# Patient Record
Sex: FEMALE | Race: WHITE | NOT HISPANIC OR LATINO | Employment: FULL TIME | URBAN - METROPOLITAN AREA
[De-identification: names, ages, dates, MRNs, and addresses within clinical notes are randomized per-mention and may not be internally consistent; named-entity substitution may affect disease eponyms.]

---

## 2021-03-24 ENCOUNTER — BMR PREADMISSION ASSESSMENT (OUTPATIENT)
Dept: ADMISSIONS | Facility: REHABILITATION | Age: 66
End: 2021-03-24

## 2021-04-06 LAB
CREAT SERPL-MCNC: 0.7 MG/DL
EXTERNAL HEMATOCRIT: 35 %
EXTERNAL HEMOGLOBIN: 11.5 G/DL

## 2021-04-07 VITALS
DIASTOLIC BLOOD PRESSURE: 53 MMHG | OXYGEN SATURATION: 90 % | HEART RATE: 89 BPM | SYSTOLIC BLOOD PRESSURE: 110 MMHG | TEMPERATURE: 96.8 F

## 2021-04-07 PROBLEM — K21.9 GERD (GASTROESOPHAGEAL REFLUX DISEASE): Status: ACTIVE | Noted: 2021-04-07

## 2021-04-07 PROBLEM — J30.2 SEASONAL ALLERGIES: Status: ACTIVE | Noted: 2021-04-07

## 2021-04-07 PROBLEM — E66.9 OBESITY: Status: ACTIVE | Noted: 2021-04-07

## 2021-04-07 PROBLEM — E78.00 HYPERCHOLESTEROLEMIA: Status: ACTIVE | Noted: 2021-04-07

## 2021-04-07 PROBLEM — F32.A DEPRESSION: Status: ACTIVE | Noted: 2021-04-07

## 2021-04-07 PROBLEM — Z96.653 STATUS POST TOTAL BILATERAL KNEE REPLACEMENT: Status: ACTIVE | Noted: 2021-04-07

## 2021-04-07 PROBLEM — M17.0 OSTEOARTHRITIS OF KNEES, BILATERAL: Status: ACTIVE | Noted: 2021-04-07

## 2021-04-07 PROBLEM — J45.909 ASTHMA: Status: ACTIVE | Noted: 2021-04-07

## 2021-04-07 PROBLEM — I10 HTN (HYPERTENSION): Status: ACTIVE | Noted: 2021-04-07

## 2021-04-07 PROBLEM — E03.9 HYPOTHYROIDISM: Status: ACTIVE | Noted: 2021-04-07

## 2021-04-07 NOTE — HOSPITAL COURSE
66yo female with PMH asthma, GERD, hypothyroidism, HTN, obesity s/p bilateral TKA secondary to OA with various deformities.     Medications as of 4/7/21:  Acetaminophen  Albuterol  Amlodipine  Aspirin  Bupropion  Cefazolin  Celecoxib  crestor  Dexamethasone  Dextromethorphan  Docusate  Gabapentin  Hydromorphone  Levothyroxine  Milk of magnesia  Ondansetron  Pantoprazole  Sertraline  Tramadol  Zolpidem    Ortho Progress Note 4/7/21:  Post operative day #1 following bilateral total knee arthroplasties. Pt. Reports very little discomfort. Dressings are clean dry and intact. There is no blood noted.   The pt's hemoglobin is stable following bilateral total knee replacements. It is above 11. She will get physical therapy this morning and then be discharged to [facility]. Follow-up planned for 2 weeks. She cannot tolerate Percocet. She is using tramadol for pain and it is controlling her pain. She was told she would need to take aspirin 325mg twice a day for 4 weeks and then she can go back to her 81mg aspirin.

## 2021-04-08 NOTE — BMR PREADMISSION NOTE
[facility]  Preadmission Assessment    Patient Name: Lili Serrato  YOB: 1955    Referral Date: 04/06/21  Evaluation Date: 04/08/21  Referring Facility Admission Date: 04/06/21  Referring Facility: [facility]   ReferringProvider:    Ignacio Youngblood MD  Patient assessed using modified protocols established during the COVID19 pandemic  Reason for Referral: Lili Serrato is a 65 y.o. female whose primary indication for inpatient rehabilitation is Ortho.   Pertinent History of Current Functional Problem:  64yo female with PMH asthma, GERD, hypothyroidism, HTN, obesity s/p bilateral TKA secondary to OA with various deformities.     Medications as of 4/7/21:  Acetaminophen  Albuterol  Amlodipine  Aspirin  Bupropion  Cefazolin  Celecoxib  crestor  Dexamethasone  Dextromethorphan  Docusate  Gabapentin  Hydromorphone  Levothyroxine  Milk of magnesia  Ondansetron  Pantoprazole  Sertraline  Tramadol  Zolpidem    Ortho Progress Note 4/7/21:  Post operative day #1 following bilateral total knee arthroplasties. Pt. Reports very little discomfort. Dressings are clean dry and intact. There is no blood noted.   The pt's hemoglobin is stable following bilateral total knee replacements. It is above 11. She will get physical therapy this morning and then be discharged to [facility]. Follow-up planned for 2 weeks. She cannot tolerate Percocet. She is using tramadol for pain and it is controlling her pain. She was told she would need to take aspirin 325mg twice a day for 4 weeks and then she can go back to her 81mg aspirin.     Active Medical Conditions:  Patient Active Problem List   Diagnosis   • Osteoarthritis of knees, bilateral   • Status post total bilateral knee replacement   • HTN (hypertension)   • Hypercholesterolemia   • Hypothyroidism   • Depression   • Asthma   • Seasonal allergies   • GERD (gastroesophageal reflux disease)   • Obesity       Active Medications:  No  medication comments found.    HISTORY:    Past Medical History:   Diagnosis Date   • Arthritis    • Asthma    • Depression    • GERD (gastroesophageal reflux disease)    • Hypothyroidism    • Lipid disorder      Past Surgical History:   Procedure Laterality Date   •  SECTION      x3   • HYSTERECTOMY     • TONSILLECTOMY       Tobacco Use as of 3/24/2021     Smoking Status Smoking Start Date Smoking Quit Date Packs/Day Years Used    Never Assessed -- -- -- --    Types Comments Smokeless Tobacco Status Smokeless Tobacco Quit Date Source    -- -- Unknown -- --            Socioeconomic as of 3/24/2021     Marital Status Spouse Name Number of Children Years Education Education Level Preferred Language Ethnicity Race Source    Legally  -- -- -- -- English Unknown White Provider    Financial Resource Strain Food Insecurity: Worry Food Insecurity: Inability Transportation Needs: Medical Transportation Needs: Non-medical    -- -- -- -- --             Allergies  Not on File         Premorbid Functional Status:   Ambulation: independent  Transferring: independent  Toileting: independent  Bathing: independent  Dressing: independent  Eating: independent  Communication: understands/communicates without difficulty  Swallowing: swallows foods/liquids without difficulty  Assistive Device/Animal Currently Used at Home: none  Prior Level of Function Comment: I with all ADL's,  mobility, I ADL's. (+) .  Works from home full time.           Living Environment:  Lives With: child(gm), adult  Living Arrangements: house  Living Environment Comment: bedroom and bathroom on second floor; half bath on first floor  Number of Stairs, Main Entrance: 3  Number of Stairs, Within Home, Primary: 12      TEST RESULTS:     Chemistry (Up to last 3 results from the past 720 hours)      04/06    Creatinine       0.7         Hepatic (Up to last 3 results from the past 720 hours)    None      Metabolic (Up to last 3 results from the  past 720 hours)    None      Hematologic (Up to last 3 results from the past 720 hours)      04/06    Hemoglobin       11.5       Hematocrit       35         Other (Up to last 3 results from the past 720 hours)    None         Diagnostics  Infection History: COVID-19(4/6: COVID (-))    ASSESSMENT:       Vitals:           Lines/Drains/Airways:   Lines, Drains & Airways  Lines, Drains & Airways: Peripheral IV, Urinary Catheter, External  Peripheral Iv Insertion Date: 04/06/21  Urinary Catheter, External Insertion Date: 04/06/21         Risk for Clinical Complications:  Constipation: Low  DVT: Low  Falls: Moderate  Infection: Low         Precautions:  Existing Precautions/Restrictions: fall           Current Diet:   Diet: thin liquids, regular solids         Current Functional Status:  Preadmission Current Function     Row Name 04/08/21 1300       Cognition/Psychosocial    Orientation Status (Cognition)  oriented x 3    Comment, Cognition  alert, appropriate, cooperative       Upper Body Dressing    Loving  supervision       Lower Body Dressing    Loving  moderate assist (50-74% patient effort)       Bathing    Loving  moderate assist (50-74% patient effort)       Grooming    Loving  supervision       Bed Mobility    Loving, Supine to Sit  minimum assist (75% or more patient effort)    Comment (Bed Mobility)  LLE management       Bed to Chair Transfer    Loving, Bed to Chair  moderate assist (50-74% patient effort)    Assistive Device  walker, front-wheeled       Sit to Stand Transfer    Loving, Sit to Stand Transfer  minimum assist (75% or more patient effort)    Assistive Device  walker, front-wheeled       Gait Training    Loving, Gait  touching/steadying assist    Assistive Device  walker, front-wheeled    Distance in Feet  25 feet               Support System:             Patient/Family Goals:             Educational Background:      RECOMMENDATIONS / PLAN:        Special Needs:            Plan:  Identified Referral Needs: medical consultative services, physical therapy, occupational therapy  OT Frequency: 5-7 times per week  OT Intensity: 1.5 hours  PT Frequency: 5-7 times per week  PT Intensity: 1.5 hours  Impairments to be addressed: mobility, safety, self-care    Medical Necessity Admission Criteria: morbid obesity, other active medical conditions (see comments)(B TKR)  Therapy Intensity: Requires, can tolerate and will benefit from 3 hours of therapy at least 5 days per week  Projected Length of Stay (days): 7 days  Patient is willing to participate in rehab program: yes         Expected Level of Function at Discharge:  Expected Functional Improvement: self-care;safety;mobility  Self-Care: Independent  Sphincter Control: Independent  Transfers: Independent  Locomotion: Independent  Communication: Independent  Social Cognition: Independent           Post-Discharge Needs:  Anticipated Discharge Disposition: home with outpatient services  Type of Outpatient Services: physical therapy  Equipment Needed After Discharge: other (see comments) (TBD by BMR staff)

## 2021-04-09 ENCOUNTER — HOSPITAL ENCOUNTER (INPATIENT)
Facility: REHABILITATION | Age: 66
LOS: 8 days | Discharge: HOME | DRG: 560 | End: 2021-04-17
Attending: PHYSICAL MEDICINE & REHABILITATION | Admitting: PHYSICAL MEDICINE & REHABILITATION
Payer: COMMERCIAL

## 2021-04-09 DIAGNOSIS — M79.89 LEG SWELLING: Primary | ICD-10-CM

## 2021-04-09 DIAGNOSIS — F43.23 ADJUSTMENT DISORDER WITH MIXED ANXIETY AND DEPRESSED MOOD: ICD-10-CM

## 2021-04-09 PROBLEM — Z96.653 STATUS POST BILATERAL KNEE REPLACEMENTS: Status: ACTIVE | Noted: 2021-04-09

## 2021-04-09 PROCEDURE — 12800000 HC ROOM AND CARE SEMIPRIVATE REHAB

## 2021-04-09 PROCEDURE — 63700000 HC SELF-ADMINISTRABLE DRUG: Performed by: INTERNAL MEDICINE

## 2021-04-09 RX ORDER — ADHESIVE BANDAGE
30 BANDAGE TOPICAL 2 TIMES DAILY PRN
Status: DISCONTINUED | OUTPATIENT
Start: 2021-04-09 | End: 2021-04-17 | Stop reason: HOSPADM

## 2021-04-09 RX ORDER — ACETAMINOPHEN 500 MG
2500 TABLET ORAL DAILY
COMMUNITY

## 2021-04-09 RX ORDER — ACETAMINOPHEN 325 MG/1
650 TABLET ORAL EVERY 6 HOURS
Status: DISCONTINUED | OUTPATIENT
Start: 2021-04-09 | End: 2021-04-17 | Stop reason: HOSPADM

## 2021-04-09 RX ORDER — ROSUVASTATIN CALCIUM 5 MG/1
10 TABLET, COATED ORAL
Status: DISCONTINUED | OUTPATIENT
Start: 2021-04-09 | End: 2021-04-17 | Stop reason: HOSPADM

## 2021-04-09 RX ORDER — AMLODIPINE BESYLATE 10 MG/1
10 TABLET ORAL DAILY
Status: ON HOLD | COMMUNITY
End: 2021-04-16 | Stop reason: SDUPTHER

## 2021-04-09 RX ORDER — OMEPRAZOLE 20 MG/1
20 CAPSULE, DELAYED RELEASE ORAL
COMMUNITY

## 2021-04-09 RX ORDER — LORATADINE 10 MG/1
10 TABLET ORAL DAILY
COMMUNITY
End: 2021-04-17 | Stop reason: HOSPADM

## 2021-04-09 RX ORDER — TRIMETHOBENZAMIDE HYDROCHLORIDE 300 MG/1
300 CAPSULE ORAL 3 TIMES DAILY PRN
Status: DISCONTINUED | OUTPATIENT
Start: 2021-04-09 | End: 2021-04-17 | Stop reason: HOSPADM

## 2021-04-09 RX ORDER — CARBOXYMETHYLCELLULOSE SODIUM 5 MG/ML
1 SOLUTION/ DROPS OPHTHALMIC 3 TIMES DAILY PRN
Status: DISCONTINUED | OUTPATIENT
Start: 2021-04-09 | End: 2021-04-17 | Stop reason: HOSPADM

## 2021-04-09 RX ORDER — BISACODYL 10 MG/1
10 SUPPOSITORY RECTAL
Status: DISCONTINUED | OUTPATIENT
Start: 2021-04-09 | End: 2021-04-16

## 2021-04-09 RX ORDER — ACETAMINOPHEN 325 MG/1
650 TABLET ORAL EVERY 4 HOURS PRN
Status: DISCONTINUED | OUTPATIENT
Start: 2021-04-09 | End: 2021-04-17 | Stop reason: HOSPADM

## 2021-04-09 RX ORDER — ASPIRIN 325 MG
325 TABLET, DELAYED RELEASE (ENTERIC COATED) ORAL 2 TIMES DAILY WITH MEALS
Status: DISCONTINUED | OUTPATIENT
Start: 2021-04-10 | End: 2021-04-17 | Stop reason: HOSPADM

## 2021-04-09 RX ORDER — SERTRALINE HYDROCHLORIDE 100 MG/1
200 TABLET, FILM COATED ORAL DAILY
COMMUNITY

## 2021-04-09 RX ORDER — SERTRALINE HYDROCHLORIDE 50 MG/1
100 TABLET, FILM COATED ORAL DAILY
Status: DISCONTINUED | OUTPATIENT
Start: 2021-04-10 | End: 2021-04-09 | Stop reason: ENTERED-IN-ERROR

## 2021-04-09 RX ORDER — LEVOTHYROXINE SODIUM 25 UG/1
25 TABLET ORAL
Status: ON HOLD | COMMUNITY
End: 2021-04-16 | Stop reason: SDUPTHER

## 2021-04-09 RX ORDER — BISACODYL 10 MG/1
10 SUPPOSITORY RECTAL DAILY PRN
Status: DISCONTINUED | OUTPATIENT
Start: 2021-04-09 | End: 2021-04-09

## 2021-04-09 RX ORDER — SENNOSIDES 8.6 MG/1
3 TABLET ORAL
Status: DISCONTINUED | OUTPATIENT
Start: 2021-04-10 | End: 2021-04-17 | Stop reason: HOSPADM

## 2021-04-09 RX ORDER — ROSUVASTATIN CALCIUM 10 MG/1
10 TABLET, COATED ORAL DAILY
Status: ON HOLD | COMMUNITY
End: 2021-04-16 | Stop reason: SDUPTHER

## 2021-04-09 RX ORDER — DOCUSATE SODIUM 100 MG/1
100 CAPSULE, LIQUID FILLED ORAL 2 TIMES DAILY
Status: DISCONTINUED | OUTPATIENT
Start: 2021-04-09 | End: 2021-04-17 | Stop reason: HOSPADM

## 2021-04-09 RX ORDER — SERTRALINE HYDROCHLORIDE 50 MG/1
200 TABLET, FILM COATED ORAL DAILY
Status: DISCONTINUED | OUTPATIENT
Start: 2021-04-10 | End: 2021-04-17 | Stop reason: HOSPADM

## 2021-04-09 RX ORDER — ALUMINUM HYDROXIDE, MAGNESIUM HYDROXIDE, AND SIMETHICONE 1200; 120; 1200 MG/30ML; MG/30ML; MG/30ML
30 SUSPENSION ORAL EVERY 4 HOURS PRN
Status: DISCONTINUED | OUTPATIENT
Start: 2021-04-09 | End: 2021-04-17 | Stop reason: HOSPADM

## 2021-04-09 RX ORDER — ALBUTEROL SULFATE 90 UG/1
2 INHALANT RESPIRATORY (INHALATION) EVERY 6 HOURS PRN
Status: DISCONTINUED | OUTPATIENT
Start: 2021-04-09 | End: 2021-04-17 | Stop reason: HOSPADM

## 2021-04-09 RX ORDER — TRAMADOL HYDROCHLORIDE 50 MG/1
50-100 TABLET ORAL EVERY 6 HOURS PRN
Status: DISCONTINUED | OUTPATIENT
Start: 2021-04-09 | End: 2021-04-09

## 2021-04-09 RX ORDER — CYCLOBENZAPRINE HCL 5 MG
10 TABLET ORAL 3 TIMES DAILY PRN
Status: DISCONTINUED | OUTPATIENT
Start: 2021-04-09 | End: 2021-04-17 | Stop reason: HOSPADM

## 2021-04-09 RX ORDER — CARBOXYMETHYLCELLULOSE SODIUM 5 MG/ML
2 SOLUTION/ DROPS OPHTHALMIC DAILY PRN
COMMUNITY

## 2021-04-09 RX ORDER — CETIRIZINE HYDROCHLORIDE 5 MG/1
5 TABLET ORAL NIGHTLY
Status: DISCONTINUED | OUTPATIENT
Start: 2021-04-10 | End: 2021-04-17 | Stop reason: HOSPADM

## 2021-04-09 RX ORDER — ONDANSETRON 4 MG/1
4 TABLET, ORALLY DISINTEGRATING ORAL EVERY 8 HOURS PRN
Status: DISCONTINUED | OUTPATIENT
Start: 2021-04-09 | End: 2021-04-09

## 2021-04-09 RX ORDER — BUPROPION HYDROCHLORIDE 300 MG/1
300 TABLET ORAL DAILY
COMMUNITY

## 2021-04-09 RX ORDER — TRAMADOL HYDROCHLORIDE 50 MG/1
50 TABLET ORAL EVERY 4 HOURS PRN
Status: DISCONTINUED | OUTPATIENT
Start: 2021-04-09 | End: 2021-04-17 | Stop reason: HOSPADM

## 2021-04-09 RX ORDER — POLYETHYLENE GLYCOL 3350 17 G/17G
17 POWDER, FOR SOLUTION ORAL DAILY
Status: DISCONTINUED | OUTPATIENT
Start: 2021-04-09 | End: 2021-04-16

## 2021-04-09 RX ORDER — SYRING-NEEDL,DISP,INSUL,0.3 ML 29 G X1/2"
148 SYRINGE, EMPTY DISPOSABLE MISCELLANEOUS ONCE
Status: COMPLETED | OUTPATIENT
Start: 2021-04-09 | End: 2021-04-09

## 2021-04-09 RX ORDER — NAPROXEN SODIUM 220 MG/1
81 TABLET, FILM COATED ORAL DAILY
COMMUNITY
End: 2021-04-17 | Stop reason: HOSPADM

## 2021-04-09 RX ORDER — LEVOTHYROXINE SODIUM 25 UG/1
25 TABLET ORAL
Status: DISCONTINUED | OUTPATIENT
Start: 2021-04-10 | End: 2021-04-17 | Stop reason: HOSPADM

## 2021-04-09 RX ORDER — DIPHENHYDRAMINE HCL 25 MG
25 CAPSULE ORAL EVERY 6 HOURS PRN
Status: DISCONTINUED | OUTPATIENT
Start: 2021-04-09 | End: 2021-04-17 | Stop reason: HOSPADM

## 2021-04-09 RX ORDER — BUPROPION HYDROCHLORIDE 150 MG/1
300 TABLET ORAL DAILY
Status: DISCONTINUED | OUTPATIENT
Start: 2021-04-10 | End: 2021-04-17 | Stop reason: HOSPADM

## 2021-04-09 RX ORDER — PANTOPRAZOLE SODIUM 20 MG/1
20 TABLET, DELAYED RELEASE ORAL
Status: DISCONTINUED | OUTPATIENT
Start: 2021-04-10 | End: 2021-04-17 | Stop reason: HOSPADM

## 2021-04-09 RX ORDER — CHOLECALCIFEROL (VITAMIN D3) 25 MCG
1000 TABLET ORAL
Status: DISCONTINUED | OUTPATIENT
Start: 2021-04-10 | End: 2021-04-17 | Stop reason: HOSPADM

## 2021-04-09 RX ORDER — AMLODIPINE BESYLATE 5 MG/1
10 TABLET ORAL NIGHTLY
Status: DISCONTINUED | OUTPATIENT
Start: 2021-04-09 | End: 2021-04-17 | Stop reason: HOSPADM

## 2021-04-09 RX ORDER — SENNOSIDES 8.6 MG/1
2 TABLET ORAL DAILY
Status: DISCONTINUED | OUTPATIENT
Start: 2021-04-10 | End: 2021-04-09

## 2021-04-09 RX ORDER — LIDOCAINE 560 MG/1
2 PATCH PERCUTANEOUS; TOPICAL; TRANSDERMAL DAILY
Status: DISCONTINUED | OUTPATIENT
Start: 2021-04-10 | End: 2021-04-16

## 2021-04-09 RX ADMIN — ACETAMINOPHEN 650 MG: 325 TABLET, FILM COATED ORAL at 23:56

## 2021-04-09 RX ADMIN — TRAMADOL HYDROCHLORIDE 50 MG: 50 TABLET, COATED ORAL at 22:58

## 2021-04-09 RX ADMIN — DOCUSATE SODIUM 100 MG: 100 CAPSULE, LIQUID FILLED ORAL at 21:34

## 2021-04-09 RX ADMIN — ACETAMINOPHEN 650 MG: 325 TABLET, FILM COATED ORAL at 18:19

## 2021-04-09 RX ADMIN — ROSUVASTATIN CALCIUM 10 MG: 5 TABLET, FILM COATED ORAL at 18:19

## 2021-04-09 RX ADMIN — POLYETHYLENE GLYCOL 3350 17 G: 17 POWDER, FOR SOLUTION ORAL at 18:19

## 2021-04-09 RX ADMIN — AMLODIPINE BESYLATE 10 MG: 5 TABLET ORAL at 21:34

## 2021-04-09 RX ADMIN — MAGNESIUM CITRATE 148 ML: 1.75 LIQUID ORAL at 18:20

## 2021-04-09 NOTE — PLAN OF CARE
Problem: Rehabilitation (IRF) Plan of Care  Goal: Plan of Care Review  Outcome: Progressing  Flowsheets (Taken 4/9/2021 1948)  Plan of Care Reviewed With: patient  IRF Plan of Care Review: progress ongoing, continue  Outcome Summary: Admission evening. Oriented patient to unit and rehab program. Patient reports mild bilateral knee pain, administered tylenol and used position adjustment for pain management. REviewed all evening medicaitons. Began admission check in process.   Plan of Care Review  IRF Plan of Care Review: progress ongoing, continue  Plan of Care Reviewed With: patient  Outcome Summary: Admission evening. Oriented patient to unit and rehab program. Patient reports mild bilateral knee pain, administered tylenol and used position adjustment for pain management. REviewed all evening medicaitons. Began admission check in process.

## 2021-04-09 NOTE — CONSULTS
Consult Note    Reason For Referral: Medical comanagements  Referring Provider:  Elisha Vaz MD  [facility] medical record reviewed     CC: B/l knee severe DJD, failed conservative treatments, s/p b/l TKA, ambulatory dysfunction.    65 y.o.female, with PMH of obesity, HTN, HLD, hypothyroidism, anxiety/depression, GERD, seasonal allergy, h/o asthma, who has advanced b/l knee DJD, failed multiple outpatient conservative treatments, associated with progressively worsening b/l knee pain and difficulty with her ambulation, admitted to [facility] on 4/6/21 and underwent an elective b/l TKA . w/o procedure related complications, transferred to [facility] on 4/9/21 for post op inpatient acute rehab to address her ambulatory dysfunction. Her post op course was not complicated, post op acute pain , on prn tramadol, she seemed not tolerating oxycodone well, post op anemia but no need PRBCm H/H stable, on  mg bid for weeks for post op b/l LE DVT prophylaxis.      Denies nausea, vomiting, abdominal pain or discomfort, dysuria, cough/sputum, running nose, sore throat, chest pain, palpitation, SOB or orthopnea, dizziness or LH,  HA.    Tolerating Diet: regular thin liquid diet      Allergies:    Not on File    Current medication list:  Current Facility-Administered Medications   Medication Dose Route Frequency Provider Last Rate Last Admin   • acetaminophen (TYLENOL) tablet 650 mg  650 mg oral q4h PRN Aj Orozco MD       • acetaminophen (TYLENOL) tablet 650 mg  650 mg oral q6h [facility] Aj Orozco MD       • alum-mag hydroxide-simeth (MAALOX) 200-200-20 mg/5 mL suspension 30 mL  30 mL oral q4h PRN Aj Orozco MD       • amLODIPine (NORVASC) tablet 10 mg  10 mg oral Nightly Aj Orozco MD       • [START ON 4/10/2021] aspirin EC tablet 325 mg  325 mg oral BID with meals Aj Orozco MD       • bisacodyL (DULCOLAX) 10 mg suppository 10 mg  10 mg rectal Daily (7p) Aj Orozco MD       • [START ON 4/10/2021] buPROPion XL  (WELLBUTRIN XL) 24 hr ER tablet 300 mg  300 mg oral Daily Aj Orozco MD       • carboxymethylcellulose (REFRESH PLUS) 0.5 % ophthalmic dropperette 1 drop  1 drop Both Eyes 3x daily PRN Aj Orozco MD       • [START ON 4/10/2021] cetirizine (ZyrTEC) tablet 5 mg  5 mg oral Nightly Aj Orozco MD       • [START ON 4/10/2021] cholecalciferol (vitamin D3) tablet 1,000 Units  1,000 Units oral Daily with dinner Aj Orozco MD       • cyclobenzaprine (FLEXERIL) tablet 10 mg  10 mg oral 3x daily PRN Aj Orozco MD       • diphenhydrAMINE (BENADRYL) capsule 25 mg  25 mg oral q6h PRN Aj Orozco MD       • docusate sodium (COLACE) capsule 100 mg  100 mg oral BID Aj Orozco MD       • [START ON 4/10/2021] levothyroxine (SYNTHROID) tablet 25 mcg  25 mcg oral Daily (6:30a) Aj Orozco MD       • [START ON 4/10/2021] lidocaine (ASPERCREME) 4 % topical patch 2 patch  2 patch Topical Daily Aj Orozco MD       • magnesium citrate solution 148 mL  148 mL oral Once Aj Orozco MD       • magnesium hydroxide (M.O.M.) 400 mg/5 mL suspension 30 mL  30 mL oral 2x daily PRN Aj Orozco MD       • [START ON 4/10/2021] pantoprazole (PROTONIX) tablet,delayed release (DR/EC) 20 mg  20 mg oral Daily before breakfast Aj Orozco MD       • polyethylene glycol (MIRALAX) 17 gram packet 17 g  17 g oral Daily Aj Orozco MD       • rosuvastatin (CRESTOR) tablet 10 mg  10 mg oral Daily (6p) Aj Orozco MD       • [START ON 4/10/2021] senna (SENOKOT) tablet 2 tablet  2 tablet oral Daily Aj Orozco MD       • [START ON 4/10/2021] sertraline (ZOLOFT) tablet 100 mg  100 mg oral Daily Aj Orozco MD       • traMADoL (ULTRAM) tablet  mg   mg oral q6h PRN Aj Orozco MD       • trimethobenzamide (TIGAN) capsule 300 mg  300 mg oral 3x daily PRN Aj Orozco MD           Past Medical History:   Past Medical History:   Diagnosis Date   • Arthritis    • Asthma    • Depression    • GERD  "(gastroesophageal reflux disease)    • Hypothyroidism    • Lipid disorder        Past Surgical History:   Past Surgical History:   Procedure Laterality Date   •  SECTION      x3   • HYSTERECTOMY     • TONSILLECTOMY         Social History:   Social History     Socioeconomic History   • Marital status: Legally      Spouse name: Not on file   • Number of children: Not on file   • Years of education: Not on file   • Highest education level: Not on file   Occupational History   • Not on file   Social Needs   • Financial resource strain: Not on file   • Food insecurity     Worry: Not on file     Inability: Not on file   • Transportation needs     Medical: Not on file     Non-medical: Not on file   Tobacco Use   • Smoking status: Not on file   Substance and Sexual Activity   • Alcohol use: Not on file   • Drug use: Not on file   • Sexual activity: Not on file   Lifestyle   • Physical activity     Days per week: Not on file     Minutes per session: Not on file   • Stress: Not on file   Relationships   • Social connections     Talks on phone: Not on file     Gets together: Not on file     Attends Christian service: Not on file     Active member of club or organization: Not on file     Attends meetings of clubs or organizations: Not on file     Relationship status: Not on file   • Intimate partner violence     Fear of current or ex partner: Not on file     Emotionally abused: Not on file     Physically abused: Not on file     Forced sexual activity: Not on file   Other Topics Concern   • Not on file   Social History Narrative   • Not on file       Family History:   Reviewed, not contributory   ROS  Complete Review of Systems:  All other systems reviewed and not remarkable except as noted in the HPI.    Vital signs:  Visit Vitals  /73 (BP Location: Left upper arm, Patient Position: Lying)   Pulse 83   Temp 37.3 °C (99.1 °F) (Oral)   Resp 18   Ht 1.588 m (5' 2.5\")   Wt 102 kg (224 lb 4.8 oz)   SpO2 94% "   BMI 40.37 kg/m²       Physical Examination:  Head/Ear/Nose/Throat: normocephalic; atraumatic; moisture mouth mm, no oropharyngeal thrush noted.   Eyes: anicteric sclera, EOMI; PERRL.   Neck : supple, no JVD, no carotid bruits appeciated.   Respiratory: no evidence of labored breathing, lung sounds CTA b/l, good aeration bibasilar area, no w/r/c.   Cardiovascular: RRR; normal S1, S2; no m/r/g; no S3 or S4.   Gastrointestinal: soft; NT; BS normal; mildly distended; no CVAT b/l.   Genitourinary: no graham.   Extremities : s/p b/l TKA, incisional site with dressing, focal c/d/i .   Neurological: AO x 3, fluent speeches, following commands, CNS II-XII grossly intact; no focal neurologic deficits.   Behavior/Emotional: in NAD, appropriate; cooperative.   Skin: clean, dry and intact.    Labs:  No results found for: WBC, HGB, HCT, MCV, PLT  Lab Results   Component Value Date    CREATININE 0.7 04/06/2021       Imaging  OSH imaging study reports reviewed      Assessment    CC: B/l knee severe DJD, failed conservative treatments, s/p b/l TKA, ambulatory dysfunction.    65 y.o.female, with PMH of obesity, HTN, HLD, hypothyroidism, anxiety/depression, GERD, seasonal allergy, h/o asthma, who has advanced b/l knee DJD, failed multiple outpatient conservative treatments, associated with progressively worsening b/l knee pain and difficulty with her ambulation, admitted to [facility] on 4/6/21 and underwent an elective b/l TKA . w/o procedure related complications, transferred to [facility] on 4/9/21 for post op inpatient acute rehab to address her ambulatory dysfunction. Her post op course was not complicated, post op acute pain , on prn tramadol, she seemed not tolerating oxycodone well, post op anemia but no need PRBCm H/H stable, on  mg bid for weeks for post op b/l LE DVT prophylaxis.    1. advanced b/l knee DJD, failed conservative treatments, progressively worsening b/l knee pain with ambulatory dysfunction,  s/p elective B/L TKA :  inpt rehab, pain management, DVT prophylaxis, surgical site wound care/dermal defense, f/u with orthopedic as scheduled.    2. post op acute pain: pain management with titration based on regular pain scale evaluation, provide topical Lidoderm patch ,  ice packs, prn muscle relaxant.    3. post op blood loss anemia: f/u H/H, no need PRBC based on current H/H level, po iron with Vit C.    4. GI-constipation, GERD: provide constipation bowel regimen, po hydration, fiber intake, timed toilet visits.  PPI for GERD.    5. DVT prophy: SCD, TEDs, early ambulation, po ecotrin per ortho, check LE venous DUS to r/o DVT.      6. HTN, Dyslipidemia: on amlodipine and statin. Orthostatic hypotension prevention.    7. -urinary retention: timed voiding, monitor PVR with cic, check UA and UCX.    8. Hypothyroidism: cont home dose of levothyroxine.    9. Anxiety, depression: cont wellbutrin and zoloft, consult psychiatrist and psychologist CBT.    Orders entered into CPOE personally  Billing code: [id number]  Diagnoses:  Patient Active Problem List   Diagnosis   • Osteoarthritis of knees, bilateral   • Status post total bilateral knee replacement   • HTN (hypertension)   • Hypercholesterolemia   • Hypothyroidism   • Depression   • Asthma   • Seasonal allergies   • GERD (gastroesophageal reflux disease)   • Obesity   • Status post bilateral knee replacements             Aj Orozco MD  4/9/2021

## 2021-04-09 NOTE — NURSING NOTE
Per nursing handoff for admission documentation, reported to RN Elo,  skin check  and 5 items within the admission navigator need to be completed.

## 2021-04-09 NOTE — H&P
Patient seen and examined on 2021 at about 5:30 PM chart from [facility] was reviewed.      History of present illness:     Patient is a 65-year-old female with history of bilateral knee pain due to DJD.  Pain not responding to conservative management.  Patient was admitted to [facility] and underwent bilateral knee arthroplasty by Dr. Youngblood.  Postoperatively he was started on aspirin 325 g twice daily for DVT prevention.  Per records patient does not tolerate Percocet currently on tramadol for pain control.  Patient was allowed weightbearing castrated bilateral lower extremity.    Patient was evaluated by physical medicine rehabilitation deemed to be a good candidate for acute rehabilitation.  Patient now transferred to [facility] also for comprehensive acute inpatient rehabitation admitted on 2021.    Past Medical History:   Diagnosis Date   • Arthritis    • Asthma    • Depression    • GERD (gastroesophageal reflux disease)    • Hypothyroidism    • Lipid disorder        Past Surgical History:   Procedure Laterality Date   •  SECTION      x3   • HYSTERECTOMY     • TONSILLECTOMY         No family history on file.     Social History     Socioeconomic History   • Marital status: Legally      Spouse name: Not on file   • Number of children: Not on file   • Years of education: Not on file   • Highest education level: Not on file   Occupational History   • Not on file   Social Needs   • Financial resource strain: Not on file   • Food insecurity     Worry: Not on file     Inability: Not on file   • Transportation needs     Medical: Not on file     Non-medical: Not on file   Tobacco Use   • Smoking status: Not on file   Substance and Sexual Activity   • Alcohol use: Not on file   • Drug use: Not on file   • Sexual activity: Not on file   Lifestyle   • Physical activity     Days per week: Not on file     Minutes per session: Not on file   • Stress:  Not on file   Relationships   • Social connections     Talks on phone: Not on file     Gets together: Not on file     Attends Islam service: Not on file     Active member of club or organization: Not on file     Attends meetings of clubs or organizations: Not on file     Relationship status: Not on file   • Intimate partner violence     Fear of current or ex partner: Not on file     Emotionally abused: Not on file     Physically abused: Not on file     Forced sexual activity: Not on file   Other Topics Concern   • Not on file   Social History Narrative   • Not on file       Scheduled Meds:  • acetaminophen  650 mg oral q6h GILL   • amLODIPine  10 mg oral Nightly   • [START ON 4/10/2021] aspirin  325 mg oral BID with meals   • bisacodyL  10 mg rectal Daily (7p)   • [START ON 4/10/2021] buPROPion XL  300 mg oral Daily   • [START ON 4/10/2021] cetirizine  5 mg oral Nightly   • [START ON 4/10/2021] cholecalciferol (vitamin D3)  1,000 Units oral Daily with dinner   • docusate sodium  100 mg oral BID   • [START ON 4/10/2021] levothyroxine  25 mcg oral Daily (6:30a)   • [START ON 4/10/2021] lidocaine  2 patch Topical Daily   • magnesium citrate  148 mL oral Once   • [START ON 4/10/2021] pantoprazole  20 mg oral Daily before breakfast   • polyethylene glycol  17 g oral Daily   • rosuvastatin  10 mg oral Daily (6p)   • [START ON 4/10/2021] senna  2 tablet oral Daily   • [START ON 4/10/2021] sertraline  100 mg oral Daily     Continuous Infusions:  PRN Meds:.•  acetaminophen  •  albuterol HFA  •  alum-mag hydroxide-simeth  •  carboxymethylcellulose  •  cyclobenzaprine  •  diphenhydrAMINE  •  magnesium hydroxide  •  traMADoL  •  trimethobenzamide        Review of systems:    Patient has no chest pain, short of breath or nausea vomiting.  She has no fever or headache.  She has constipation since surgery.  She denied bladder problems.  She has history of depression.  She has some edema bilateral lower extremity.  She has no  "dysphagia or dysarthria.  She has dressing bilateral knees.  Review of other systems was negative.      Physical examination:    His examination today showed a 65-year-old female no acute distress.  Patient awake alert oriented x3.    Blood pressure 130/73, pulse 83, temperature 37.3 °C (99.1 °F), temperature source Oral, resp. rate 18, height 1.588 m (5' 2.5\"), weight 102 kg (224 lb 4.8 oz), SpO2 94 %.    Head normocephalic, sclera nonicteric, mucous brains moist.    Neck supple    Heart regular S1-S2    Lungs respiration nonlabored    GI examination was benign.    Musculoskeletal exam: Passive range of motion within functional limit bilateral upper extremity.  Examination of bilateral lower extremity range of motion bilateral knee about -10-80 degrees.  Patient with good quad function bilaterally.  There was no evidence of calf tenderness or foot drop.    Neuro exam: Mental status as above patient able to obey simple commands.  Cranial nerve examination shows face symmetric, tongue midline, ocular motility intact and visual fields are full.  Motor examination bilateral upper extremity 5/5.  Examination of bilateral lower extremity exam was suboptimal due to recent bilateral knee surgery there was hip and ankle 5/5.  Sensory examination grossly normal.  Deep tendon reflexes were symmetrical bilateral upper extremity.  Gait not tested at this time.    Impression:    Ambulatory ADL dysfunction due to:    DJD status post bilateral knee arthroplasty, patient is weightbearing as tolerated bilateral lower extremity.  Patient on aspirin for DVT prevention.  Patient on tramadol for pain control.    Postoperative anemia    History of hypertension on Norvasc    Hypothyroidism on Synthroid    History of depression on Wellbutrin and Zoloft    Hyperlipidemia on Crestor    GERD on Protonix    History of asthma on albuterol HFA as needed      Plan:    Patient will continue physical therapy and Occupational Therapy.  We will " consult Dr. Orozco for medical management, patient also be followed by Dr. Boinlla psychiatry.  Patient be followed by psychology and case management for support.  Patient will receive 24-hour application nursing for bowel and bladder management, pain monitoring and education.  Rehabitation precautions include cardiac and for precautions.  Will monitor routine labs in addition we will monitor healing of bilateral knee incision healing closely.    Goals: Improve overall function for transfers, ambulation, ADLs and elevation.    Extent of length of stay about 10 days    Discussed case with Dr. Orozco medical consultant, patient and nursing    This patient note has been dictated using speech recognition software.  Inadvertent speech recognition errors should be disregarded. Please do not hesitate to call my office for clarification.    Discussed with patient, Dr. Orozco medical consultant and nursing.    This patient note has been dictated using speech recognition software.  Inadvertent speech recognition errors should be disregarded. Please do not hesitate to call my office for clarification.      Post Admission Physician Evaluation    Lili Serrato is admitted to [facility] for comprehensive inpatient rehabilitation for Ortho with functional deficits in mobility;safety;self-care. Patient is receiving the following services: medical consultative services;physical therapy;occupational therapy.    Active medical management is required for   Patient Active Problem List   Diagnosis   • Osteoarthritis of knees, bilateral   • Status post total bilateral knee replacement   • HTN (hypertension)   • Hypercholesterolemia   • Hypothyroidism   • Depression   • Asthma   • Seasonal allergies   • GERD (gastroesophageal reflux disease)   • Obesity   • Status post bilateral knee replacements   • Leg swelling       Premorbid Function  Ambulation: independent  Transferring: independent  Toileting: independent  Bathing:  independent  Dressing: independent  Eating: independent  Communication: understands/communicates without difficulty  Swallowing: swallows foods/liquids without difficulty  Assistive Device/Animal Currently Used at Home: none  Prior Level of Function Comment: I with all ADL's,  mobility, I ADL's. (+) .  Works from home full time.      Current Function  Gait  [place], Gait: touching/steadying assist  Assistive Device: walker, front-wheeled    Stairs     Wheelchair     Transfers  [place], Supine to Sit: minimum assist (75% or more patient effort)  Comment (Bed Mobility): LLE management  [place], Bed to Chair: moderate assist (50-74% patient effort)  Assistive Device: walker, front-wheeled  [place], Sit to Stand Transfer: minimum assist (75% or more patient effort)  Assistive Device: walker, front-wheeled       Self Care  Lisman: moderate assist (50-74% patient effort)  Lisman: supervision    Cognition  Orientation Status (Cognition): oriented x 3  Comment, Cognition: alert, appropriate, cooperative    Communication     Swallow       Risk for Complications  Constipation: Low  DVT: Low  Falls: Moderate  Infection: Low      Expected Level of Function  Expected Functional Improvement: self-care;safety;mobility  Self-Care: Independent  Sphincter Control: Independent  Transfers: Independent  Locomotion: Independent  Communication: Independent  Social Cognition: Independent      Anticipated Discharge Plan  Anticipated Discharge Disposition: home with outpatient services  Type of Outpatient Services: physical therapy      I have reviewed the pre-admission screening and there are no relevant changes.    Expected length of stay: 7 days

## 2021-04-10 ENCOUNTER — APPOINTMENT (INPATIENT)
Dept: PHYSICAL THERAPY | Facility: REHABILITATION | Age: 66
DRG: 560 | End: 2021-04-10
Payer: COMMERCIAL

## 2021-04-10 ENCOUNTER — APPOINTMENT (INPATIENT)
Dept: OCCUPATIONAL THERAPY | Facility: REHABILITATION | Age: 66
DRG: 560 | End: 2021-04-10
Payer: COMMERCIAL

## 2021-04-10 LAB
ALBUMIN SERPL-MCNC: 2.8 G/DL (ref 3.4–5)
ALP SERPL-CCNC: 67 IU/L (ref 35–126)
ALT SERPL-CCNC: 29 IU/L (ref 11–54)
ANION GAP SERPL CALC-SCNC: 8 MEQ/L (ref 3–15)
AST SERPL-CCNC: 33 IU/L (ref 15–41)
BASOPHILS # BLD: 0.04 K/UL (ref 0.01–0.1)
BASOPHILS NFR BLD: 0.5 %
BILIRUB SERPL-MCNC: 0.9 MG/DL (ref 0.3–1.2)
BILIRUB UR QL STRIP.AUTO: NEGATIVE MG/DL
BUN SERPL-MCNC: 14 MG/DL (ref 8–20)
CALCIUM SERPL-MCNC: 8.4 MG/DL (ref 8.9–10.3)
CHLORIDE SERPL-SCNC: 104 MEQ/L (ref 98–109)
CLARITY UR REFRACT.AUTO: CLEAR
CO2 SERPL-SCNC: 30 MEQ/L (ref 22–32)
COLOR UR AUTO: YELLOW
CREAT SERPL-MCNC: 0.5 MG/DL (ref 0.6–1.1)
DIFFERENTIAL METHOD BLD: ABNORMAL
EOSINOPHIL # BLD: 0.47 K/UL (ref 0.04–0.36)
EOSINOPHIL NFR BLD: 5.7 %
ERYTHROCYTE [DISTWIDTH] IN BLOOD BY AUTOMATED COUNT: 13.7 % (ref 11.7–14.4)
GFR SERPL CREATININE-BSD FRML MDRD: >60 ML/MIN/1.73M*2
GLUCOSE SERPL-MCNC: 112 MG/DL (ref 70–99)
GLUCOSE UR STRIP.AUTO-MCNC: NEGATIVE MG/DL
HCT VFR BLDCO AUTO: 30 % (ref 35–45)
HGB BLD-MCNC: 10 G/DL (ref 11.8–15.7)
HGB UR QL STRIP.AUTO: NEGATIVE
IMM GRANULOCYTES # BLD AUTO: 0.05 K/UL (ref 0–0.08)
IMM GRANULOCYTES NFR BLD AUTO: 0.6 %
KETONES UR STRIP.AUTO-MCNC: NEGATIVE MG/DL
LEUKOCYTE ESTERASE UR QL STRIP.AUTO: NEGATIVE
LYMPHOCYTES # BLD: 1.71 K/UL (ref 1.2–3.5)
LYMPHOCYTES NFR BLD: 20.7 %
MAGNESIUM SERPL-MCNC: 2 MG/DL (ref 1.8–2.5)
MCH RBC QN AUTO: 31.4 PG (ref 28–33.2)
MCHC RBC AUTO-ENTMCNC: 33.3 G/DL (ref 32.2–35.5)
MCV RBC AUTO: 94.3 FL (ref 83–98)
MONOCYTES # BLD: 0.96 K/UL (ref 0.28–0.8)
MONOCYTES NFR BLD: 11.6 %
NEUTROPHILS # BLD: 5.02 K/UL (ref 1.7–7)
NEUTS SEG NFR BLD: 60.9 %
NITRITE UR QL STRIP.AUTO: NEGATIVE
NRBC BLD-RTO: 0 %
PDW BLD AUTO: 9.9 FL (ref 9.4–12.3)
PH UR STRIP.AUTO: 6.5 [PH]
PLATELET # BLD AUTO: 221 K/UL (ref 150–369)
POTASSIUM SERPL-SCNC: 3.7 MEQ/L (ref 3.6–5.1)
PREALB SERPL-MCNC: 12.3 MG/DL (ref 18–38)
PROT SERPL-MCNC: 5 G/DL (ref 6–8.2)
PROT UR QL STRIP.AUTO: NEGATIVE
RBC # BLD AUTO: 3.18 M/UL (ref 3.93–5.22)
SODIUM SERPL-SCNC: 142 MEQ/L (ref 136–144)
SP GR UR REFRACT.AUTO: 1.02
UROBILINOGEN UR STRIP-ACNC: 0.2 EU/DL
WBC # BLD AUTO: 8.25 K/UL (ref 3.8–10.5)

## 2021-04-10 PROCEDURE — 80053 COMPREHEN METABOLIC PANEL: CPT | Performed by: INTERNAL MEDICINE

## 2021-04-10 PROCEDURE — 97530 THERAPEUTIC ACTIVITIES: CPT | Mod: GP

## 2021-04-10 PROCEDURE — 97535 SELF CARE MNGMENT TRAINING: CPT | Mod: GO

## 2021-04-10 PROCEDURE — 85025 COMPLETE CBC W/AUTO DIFF WBC: CPT | Performed by: INTERNAL MEDICINE

## 2021-04-10 PROCEDURE — 83735 ASSAY OF MAGNESIUM: CPT | Performed by: INTERNAL MEDICINE

## 2021-04-10 PROCEDURE — 97110 THERAPEUTIC EXERCISES: CPT | Mod: GP

## 2021-04-10 PROCEDURE — 12800000 HC ROOM AND CARE SEMIPRIVATE REHAB

## 2021-04-10 PROCEDURE — 81003 URINALYSIS AUTO W/O SCOPE: CPT | Performed by: INTERNAL MEDICINE

## 2021-04-10 PROCEDURE — 97161 PT EVAL LOW COMPLEX 20 MIN: CPT | Mod: GP

## 2021-04-10 PROCEDURE — 97116 GAIT TRAINING THERAPY: CPT | Mod: GP

## 2021-04-10 PROCEDURE — 63700000 HC SELF-ADMINISTRABLE DRUG: Performed by: INTERNAL MEDICINE

## 2021-04-10 PROCEDURE — 97166 OT EVAL MOD COMPLEX 45 MIN: CPT | Mod: GO

## 2021-04-10 PROCEDURE — 84134 ASSAY OF PREALBUMIN: CPT | Performed by: INTERNAL MEDICINE

## 2021-04-10 PROCEDURE — 36415 COLL VENOUS BLD VENIPUNCTURE: CPT | Performed by: INTERNAL MEDICINE

## 2021-04-10 RX ADMIN — CETIRIZINE HYDROCHLORIDE 5 MG: 5 TABLET ORAL at 21:30

## 2021-04-10 RX ADMIN — TRAMADOL HYDROCHLORIDE 50 MG: 50 TABLET, COATED ORAL at 17:18

## 2021-04-10 RX ADMIN — TRAMADOL HYDROCHLORIDE 50 MG: 50 TABLET, COATED ORAL at 21:31

## 2021-04-10 RX ADMIN — LEVOTHYROXINE SODIUM 25 MCG: 25 TABLET ORAL at 05:49

## 2021-04-10 RX ADMIN — ASPIRIN 325 MG: 325 TABLET, COATED ORAL at 17:18

## 2021-04-10 RX ADMIN — DOCUSATE SODIUM 100 MG: 100 CAPSULE, LIQUID FILLED ORAL at 21:30

## 2021-04-10 RX ADMIN — ACETAMINOPHEN 650 MG: 325 TABLET, FILM COATED ORAL at 17:18

## 2021-04-10 RX ADMIN — ASPIRIN 325 MG: 325 TABLET, COATED ORAL at 09:53

## 2021-04-10 RX ADMIN — ACETAMINOPHEN 650 MG: 325 TABLET, FILM COATED ORAL at 05:49

## 2021-04-10 RX ADMIN — PANTOPRAZOLE SODIUM 20 MG: 20 TABLET, DELAYED RELEASE ORAL at 09:53

## 2021-04-10 RX ADMIN — ACETAMINOPHEN 650 MG: 325 TABLET, FILM COATED ORAL at 12:33

## 2021-04-10 RX ADMIN — Medication 1000 UNITS: at 17:18

## 2021-04-10 RX ADMIN — LIDOCAINE 2 PATCH: 246 PATCH TOPICAL at 09:54

## 2021-04-10 RX ADMIN — BUPROPION HYDROCHLORIDE 300 MG: 150 TABLET, FILM COATED, EXTENDED RELEASE ORAL at 09:55

## 2021-04-10 RX ADMIN — ROSUVASTATIN CALCIUM 10 MG: 5 TABLET, FILM COATED ORAL at 17:18

## 2021-04-10 RX ADMIN — SERTRALINE 200 MG: 50 TABLET, FILM COATED ORAL at 09:54

## 2021-04-10 RX ADMIN — TRAMADOL HYDROCHLORIDE 50 MG: 50 TABLET, COATED ORAL at 09:52

## 2021-04-10 NOTE — PROGRESS NOTES
Patient: Lili Serrato  Location: [facility] [address]  MRN: [id number]  Today's date: 4/10/2021    History of Present Illness  Lili is a 65 y.o. female admitted on 4/9/2021 with Status post bilateral knee replacements [Z96.653]. Principal problem is Status post bilateral knee replacements. Pt with hx of bilateral knee pain due to DJD.  Pain did not respond to conservative management.  Pt admitted to [facility] and underwent bilateral knee arthroplasty by Dr. Youngblood.  Patient was allowed weightbearing as tolerated bilateral lower extremity.      Past Medical History  Lili has a past medical history of Arthritis, Asthma, Depression, GERD (gastroesophageal reflux disease), Hypothyroidism, and Lipid disorder.    PT Vitals    Date/Time Pulse BP BP Location BP Method Pt Position [facility]   04/10/21 1505 87 129/69 Left upper arm Automatic Sitting BES   04/10/21 1555 80 126/70 Left upper arm Automatic Sitting BES      PT Pain    Date/Time Pain Type Pref Pain Scale Side Location Rating: Rest Rating: Activity Interventions [facility]   04/10/21 1505 Pain Assessment number (Numeric Rating Pain Scale) Bilateral knee 3 3 cold applied BES   04/10/21 1555 Pain Assessment number (Numeric Rating Pain Scale) Bilateral knee 0 0 -- BES          Prior Living Environment      Most Recent Value   Lives With  child(gm), adult   Living Arrangements  house   Location, Patient Bedroom  second floor, must negotiate stairs to access   Location, Bathroom  second floor, must negotiate stairs to access   Bathroom Access Comment  Pt. reports tub shower, no grab bars or DME          Prior Level of Function      Most Recent Value   Dominant Hand  right   Ambulation  independent   Transferring  independent   Toileting  independent   Bathing  independent   Dressing  independent   Assistive Device/Animal Currently Used at Home  none          IRF PT Evaluation and Treatment - 04/10/21 1505        Time Calculation    Start  Time  1500     Stop Time  1600     Time Calculation (min)  60 min        Session Details    Document Type  daily treatment/progress note     Mode of Treatment  physical therapy;individual therapy        General Information    Patient Profile Reviewed?  yes     Existing Precautions/Restrictions  fall        Bed Mobility    [place], Supine to Sit  close supervision     [place], Sit to Supine  minimum assist (75% or more patient effort)    to clear R LE on to EOB    Comment (Bed Mobility)  on/off therapy mat with wedge & pillows        Sit to Stand Transfer    [place], Sit to Stand Transfer  touching/steadying assist;close supervision     Assistive Device  walker, front-wheeled     Comment  progressed to cl S by end of session        Stand to Sit Transfer    [place], Stand to Sit Transfer  close supervision     Assistive Device  walker, front-wheeled     Comment  cues to keep feet planted to increase B knee flexion stretch        Stand Pivot Transfer    [place], Stand Pivot/Stand Step Transfer  close supervision     Assistive Device  walker, front-wheeled     Comment  cues for posture & positioning in AD        Gait Training    [place], Gait  close supervision     Assistive Device  walker, front-wheeled     Distance in Feet  100 feet     Gait Pattern Utilized  step-through     Deviations/Abnormal Patterns (Gait)  base of support, wide;trey decreased;gait speed decreased;step length decreased     Comment  100'x1 + 50'x1 with standing rest break; cues for shoulder depression & fluid overground stepping        Lower Extremity (Therapeutic Exercise)    Exercise Position/Type (LE Therapeutic Exercise)  supine;AROM (active range of motion);AAROM (active assistive range of motion)     General Exercise (LE Therapeutic Exercise)  bilateral;ankle pumps;knee flexion;SAQ (short arc quad);heel slides;hip aBduction     Reps and Sets (LE Therapeutic Exercise)  see below     Comment (LE Therapeutic  Exercise)  Supine TE: AP x30, SAQ 2x10, bridges 2x12, hip abduction on powder board 2x10, heel slides on powder board 2x10 (cues for neutral hip with hip abduction & heel slides, cues for eccentric control on SAQ & heel slides)        Modality Treatment    Treatment Location 1 (Modality)  B knees     Modality Performed  cryotherapy     Modality Treatment Results  decreased pain     Comment, Modality Treatment  supine with BLE elevated on wedge & pillows with gel ice packs behind knees/calves x12 minutes        Daily Progress Summary (PT)    Daily Outcome Statement (PT)  Pt able to increase ambulation distance this session with notable improvement in fluidity. Pt tolerated appropriate progression of supine TE and will benefit from continued challenge to maximize functional return.                             IRF PT Goals      Most Recent Value   Bed Mobility Goal 1   Activity/Assistive Device  rolling to left, rolling to right, sit to supine, supine to sit at 04/10/2021 0833   [place]  modified independence at 04/10/2021 0833   Time Frame  short-term goal (STG), 1 week at 04/10/2021 0833   Bed Mobility Goal 2   Activity/Assistive Device  rolling to left, rolling to right, sit to supine, supine to sit at 04/10/2021 0833   [place]  modified independence at 04/10/2021 0833   Time Frame  long-term goal (LTG), 2 weeks [STG = LTG] at 04/10/2021 0833   Transfer Goal 1   Activity/Assistive Device  sit-to-stand/stand-to-sit, stand pivot, car transfer, walker, front-wheeled at 04/10/2021 1135   [place]  supervision required, verbal cues required at 04/10/2021 1135   Time Frame  short-term goal (STG), 1 week at 04/10/2021 1135   Transfer Goal 2   Activity/Assistive Device  sit-to-stand/stand-to-sit, car transfer, walker, front-wheeled at 04/10/2021 1135   [place]  modified independence at 04/10/2021 1135   Time Frame  long-term goal (LTG), 2 weeks at 04/10/2021 1135   Gait/Walking Locomotion Goal 1    Activity/Assistive Device  gait (walking locomotion), assistive device use at 04/10/2021 1135   Distance  200 feet at 04/10/2021 1135   [place]  supervision required, verbal cues required at 04/10/2021 1135   Time Frame  short-term goal (STG), 1 week at 04/10/2021 1135   Gait/Walking Locomotion Goal 2   Activity/Assistive Device  gait (walking locomotion), assistive device use at 04/10/2021 1135   Distance  200 feet at 04/10/2021 1135   [place]  modified independence at 04/10/2021 1135   Time Frame  short-term goal (STG), 2 weeks at 04/10/2021 1135   Stairs Goal 1   Activity/Assistive Device  ascending stairs, descending stairs, using handrail, left, cane, straight at 04/10/2021 0833   Number of Stairs  8 at 04/10/2021 0833   [place]  supervision required at 04/10/2021 0833   Time Frame  short-term goal (STG), 1 week at 04/10/2021 0833   Stairs Goal 2   Activity/Assistive Device  ascending stairs, descending stairs, using handrail, left, cane, straight at 04/10/2021 0833   Number of Stairs  12 at 04/10/2021 0833   [place]  modified independence at 04/10/2021 0833   Time Frame  long-term goal (LTG), 2 weeks at 04/10/2021 0833

## 2021-04-10 NOTE — PLAN OF CARE
"  Problem: Rehabilitation (IRF) Plan of Care  Goal: Plan of Care Review  Flowsheets (Taken 4/10/2021 6195)  Plan of Care Reviewed With: patient  IRF Plan of Care Review: progress ongoing, continue  Outcome Summary: PT IE completed. Pt benefits from introduction of AD for overground ambulation. Sucessfully completed negotiation of 4\" stairs today with plan to progress to 6\" stairs.     "

## 2021-04-10 NOTE — PLAN OF CARE
Problem: Rehabilitation (IRF) Plan of Care  Goal: Plan of Care Review  Outcome: Progressing  Flowsheets (Taken 4/10/2021 1242)  Plan of Care Reviewed With: patient  IRF Plan of Care Review: progress ongoing, continue  Outcome Summary: OT IE complete, pt. educated on use of call bell, OT POC and OT process

## 2021-04-10 NOTE — PROGRESS NOTES
04/10/21 1300   General Information   Preferred Language eng   Referring Facility Type acute care facility   Contact Information   Permission Granted to Share Info With family/designee   Contact Information Comments cary Bui     Advance Directives (for Healthcare)   Does patient have advance directive? No   Patient does not have Advance Directive Patient/Family declines further information   Living Environment   Lives With child(gm), adult   Living Arrangement Comments 2 SH with 3 STEs   first floor half bath.  FF to BB    Employment/   Employment Status employed full time   Employment/ Comments clinical trial coordinator.    Values/Beliefs   Spiritual, Cultural Beliefs, Amish Practices, Values that Affect Care no   Discharge Needs Assessment (IRF)   Anticipated Discharge Disposition home with home health services;home with outpatient services   Team Conference   Next Team Conference Date 04/13/21

## 2021-04-10 NOTE — PROGRESS NOTES
Patient: Lili Serrato  Location: [facility] [address]  MRN: [id number]  Today's date: 4/10/2021    History of Present Illness  Lili is a 65 y.o. female admitted on 4/9/2021 with Status post bilateral knee replacements [Z96.653]. Principal problem is Status post bilateral knee replacements. Pt with hx of bilateral knee pain due to DJD.  Pain did not respond to conservative management.  Pt admitted to [facility] and underwent bilateral knee arthroplasty by Dr. Youngblood.  Patient was allowed weightbearing as tolerated bilateral lower extremity.      Past Medical History  Lili has a past medical history of Arthritis, Asthma, Depression, GERD (gastroesophageal reflux disease), Hypothyroidism, and Lipid disorder.    OT Vitals    Date/Time Pulse SpO2 Pt Activity O2 Therapy O2 Del Method O2 Flow Rate BP BP Location BP Method Pt Position [facility]   04/10/21 0725 87 98 % At rest Supplemental oxygen Nasal cannula 1.5 L/min 111/61 Left upper arm Automatic Sitting AM   04/10/21 0756 94 -- -- -- -- -- 124/72 Left upper arm Automatic Sitting AM      OT Pain    Date/Time Pain Type Pref Pain Scale Location Rating: Rest Rating: Activity [facility]   04/10/21 0725 Pain Assessment word (verbal rating pain scale) knee 0 3 AM          Prior Living Environment      Most Recent Value   Living Arrangements  house   Location, Patient Bedroom  second floor, must negotiate stairs to access   Location, Bathroom  second floor, must negotiate stairs to access   Bathroom Access Comment  Pt. reports tub shower, no grab bars or DME          Prior Level of Function      Most Recent Value   Dominant Hand  right   Ambulation  independent   Transferring  independent   Toileting  independent   Bathing  independent   Dressing  independent   Assistive Device/Animal Currently Used at Home  none          Occupational Profile      Most Recent Value   Occupational History/Life Experiences  Pt. works as a clinical trial  "coordinator, (+) , enjoys knitting, journaling   Patient Goals  \"I want to be able to go out dancing\"          IRF OT Evaluation and Treatment - 04/10/21 0725        Time Calculation    Start Time  0725     Stop Time  0810     Time Calculation (min)  45 min        Session Details    Document Type  initial evaluation     Mode of Treatment  individual therapy;occupational therapy        General Information    General Observations of Patient  pt. received c PCT obtaining vitals     Existing Precautions/Restrictions  fall        Cognition/Psychosocial    Cognitive Function (Cognitive)  memory deficit     Comment, Short Term Memory  BIMS: 15/15, 3/3 immediate recall, 3/3 orientation, 3/3 delayed recall        Vision Assessment/Intervention    Visual Impairment/Limitations  corrective lenses full time     Impact of Vision Impairment on Function  pt. reports no change in vision since admission        Sensory Assessment (Somatosensory)    Sensory Assessment (Somatosensory)  left UE;right UE     Left UE Sensory Assessment  light touch awareness;light touch localization;proprioception;intact     Right UE Sensory Assessment  light touch awareness;light touch localization;proprioception;intact        Range of Motion (ROM)    Range of Motion  bilateral upper extremities;ROM is WFL    B UE ROM WFL       Strength (Manual Muscle Testing)    Strength (Manual Muscle Testing)  bilateral upper extremities;strength is WFL    grossly 4+/5       Sit to Stand Transfer    [place], Sit to Stand Transfer  minimum assist (75% or more patient effort)     Assistive Device  none     Comment  from EOB x 2 attempts        Stand to Sit Transfer    [place], Stand to Sit Transfer  minimum assist (75% or more patient effort)     Assistive Device  none     Comment  to EOB x 2 attempts        Toilet Transfer    [place], Toilet Transfer  unable to assess     Comment  unsafe to assess s DME intervention        Shower Transfer    " [place], Shower Transfer  unable to assess     Comment  unable to assess 2* late arrival of w/c. would require DME for safe completion of full wet shower        Balance    Comment, Balance  Cl S sitting EOB        Bathing    Self-Performance  chest;left arm;right arm;abdomen;front perineal area;buttocks     [place] Assistance  left upper leg;right upper leg;left lower leg, including foot;right lower leg, including foot     [place]  moderate assist (50-74% patient effort)     Position  sitting up in bed     Setup Assistance  adjust water temperature/flow;obtain supplies;open containers     Adaptive Equipment  none     Comment  pt. completes spongebathing sitting up in bed. A to wash B LE        Upper Body Dressing    Tasks  don;bra/undergarment;pull over garment     Self-Performance  threads left arm, bra/undershirt;threads right arm, bra/undershirt;fastens bra;pulls bra/undershirt over head/around back;pulls bra/undershirt down/adjusts;threads left arm, shirt;threads right arm, shirt;pulls shirt over head/around back;pulls shirt down/adjusts     [place]  set up;close supervision     Position  sitting up in bed     Adaptive Equipment  none     Comment  pt. completes UB dressing sitting up in bed c Cl S        Lower Body Dressing    Self-Performance  pulls underpants up or down;pulls pants/shorts up or down     [place] Assistance  threads left leg, underpants;threads right leg, underpants;threads left leg, pants/shorts;threads right leg, pants/shorts;dons/doffs left sock;dons/doffs right sock     [place]  maximum assist (25-49% patient effort)     Position  edge of bed sitting;unsupported standing     Adaptive Equipment  none     [place], Footwear  dependent (less than 25% patient effort)     Comment  therapist threading L/R LE into pull up brief and pants sitting EOB. In stancce c min steadying A, pt. pulls brief and pants up. total A to don socks        Grooming    Self-Performance  washes,  rinses and dries hands;brushes/elean hair;oral care (brushing teeth, cleaning dentures)     [place]  set up;close supervision     Position  supported sitting;sink side     Setup Assistance  obtain supplies     Adaptive Equipment  none     [place], Oral Hygiene  set up;close supervision     Comment  pt. completes grooming tasks sitting at sink side in w/c c Cl S        Toileting    [place]  minimum assist (75% or more patient effort)     Position  supported sitting;unsupported standing     Setup Assistance  adaptive equipment setup;obtain supplies     Adaptive Equipment  commode, 3-in-1     Comment  pt. continent of urine, complete hygiene sitting on 3 in 1 commode. min A in stance to complete CM up over hips        Therapy Assessment/Plan (OT)    Rehab Potential/Prognosis (OT)  good, to achieve stated therapy goals     Frequency of Treatment (OT)  60-90 minutes per day;5-7 times per week     Estimated Length of Stay  2 weeks     Problem List (OT)  problems related to;balance;mobility;motor control;range of motion (ROM);pain;strength     Activity Limitations Related to Problem List  unable to ambulate safely;unable to transfer safely;BADLs not performed adequately or safely;IADLs not performed adequately or safely;community activities not performed adequately or safely;home management activity not performed adequately or safely     Planned Therapy Interventions (OT)  activity tolerance training;adaptive equipment training;BADL retraining;edema control/reduction;functional balance retraining;IADL retraining;occupation/activity based interventions;passive ROM/stretching;patient/caregiver education/training;ROM/therapeutic exercise;strengthening exercise;transfer/mobility retraining     Comment, Therapy Assessment/Plan (OT)  Pt. is a 66yo female with PMH asthma, GERD, hypothyroidism, HTN, obesity s/p bilateral TKA secondary to OA with various deformities. Pt. was I PTA c all ADL tasks, now functioning at  mod A for spongebathing, Cl S for UB dressing, max A for LB dressing, total A for footwear, Cl S for grooming and min A for toileting. Pt. would benfit from IP OT to improve performance in BADL tasks and functional tranfsers and improve overall quality of life.                       Education provided this session. See the Patient Education summary report for full details.    IRF OT Goals      Most Recent Value   Transfer Goal 1   Activity/Assistive Device  toilet at 04/10/2021 0725   [place]  supervision required [cls] at 04/10/2021 0725   Time Frame  short-term goal (STG), 5 - 7 days at 04/10/2021 0725   Transfer Goal 2   Activity/Assistive Device  toilet at 04/10/2021 0725   [place]  modified independence at 04/10/2021 0725   Time Frame  long-term goal (LTG), 2 weeks at 04/10/2021 0725   Transfer Goal 3   Activity/Assistive Device  shower at 04/10/2021 0725   Time Frame  short-term goal (STG), 5 - 7 days at 04/10/2021 0725   Strategies/Barriers  TBA at 04/10/2021 0725   Transfer Goal 4   Activity/Assistive Device  shower at 04/10/2021 0725   Time Frame  long-term goal (LTG), 2 weeks at 04/10/2021 0725   Bathing Goal 1   Activity/Assistive Device  bathing skills, all at 04/10/2021 0725   [place]  minimum assist (75% or more patient effort) at 04/10/2021 0725   Time Frame  short-term goal (STG), 5 - 7 days at 04/10/2021 0725   Bathing Goal 2   Activity/Assistive Device  bathing skills, all at 04/10/2021 0725   [place]  supervision required at 04/10/2021 0725   Time Frame  long-term goal (LTG), 2 weeks at 04/10/2021 0725   UB Dressing Goal 1   Activity/Assistive Device  upper body dressing at 04/10/2021 0725   [place]  supervision required at 04/10/2021 0725   Time Frame  short-term goal (STG), 5 - 7 days at 04/10/2021 0725   UB Dressing Goal 2   Activity/Assistive Device  upper body dressing at 04/10/2021 0725   [place]  modified independence at 04/10/2021 0725   Time Frame  long-term  goal (LTG), 2 weeks at 04/10/2021 0725   LB Dressing Goal 1   Activity/Assistive Device  lower body dressing at 04/10/2021 0725   [place]  moderate assist (50-74% patient effort) at 04/10/2021 0725   Time Frame  short-term goal (STG), 5 - 7 days at 04/10/2021 0725   LB Dressing Goal 2   Activity/Assistive Device  lower body dressing at 04/10/2021 0725   [place]  supervision required at 04/10/2021 0725   Time Frame  long-term goal (LTG), 2 weeks at 04/10/2021 0725   Grooming Goal 1   Activity/Assistive Device  grooming skills, all at 04/10/2021 0725   [place]  supervision required at 04/10/2021 0725   Time Frame  short-term goal (STG), 5 - 7 days at 04/10/2021 0725   Grooming Goal 2   Activity/Assistive Device  grooming skills, all at 04/10/2021 0725   [place]  modified independence at 04/10/2021 0725   Time Frame  long-term goal (LTG), 2 weeks at 04/10/2021 0725   Toileting Goal 1   Activity/Assistive Device  toileting skills, all at 04/10/2021 0725   [place]  supervision required [Cl S] at 04/10/2021 0725   Time Frame  short-term goal (STG), 5 - 7 days at 04/10/2021 0725   Toileting Goal 2   Activity/Assistive Device  toileting skills, all at 04/10/2021 0725   [place]  modified independence at 04/10/2021 0725   Time Frame  long-term goal (LTG), 5 - 7 days at 04/10/2021 0725

## 2021-04-10 NOTE — PROGRESS NOTES
"Patient: Lili Serrato  Location: [facility] [address]  MRN: [id number]  Today's date: 4/10/2021    History of Present Illness  Lili is a 65 y.o. female admitted on 4/9/2021 with Status post bilateral knee replacements [Z96.653]. Principal problem is Status post bilateral knee replacements. Pt with hx of bilateral knee pain due to DJD.  Pain did not respond to conservative management.  Pt admitted to [facility] and underwent bilateral knee arthroplasty by Dr. Youngblood.  Patient was allowed weightbearing as tolerated bilateral lower extremity.      Past Medical History  Lili has a past medical history of Arthritis, Asthma, Depression, GERD (gastroesophageal reflux disease), Hypothyroidism, and Lipid disorder.    OT Pain    Date/Time Pain Type Pref Pain Scale Side Location Rating: Rest Rating: Activity [facility]   04/10/21 0824 Post Activity number (Numeric Rating Pain Scale) Bilateral knee 0 3 AM          Prior Living Environment      Most Recent Value   Living Arrangements  house   Location, Patient Bedroom  second floor, must negotiate stairs to access   Location, Bathroom  second floor, must negotiate stairs to access   Bathroom Access Comment  Pt. reports tub shower, no grab bars or DME          Prior Level of Function      Most Recent Value   Dominant Hand  right   Ambulation  independent   Transferring  independent   Toileting  independent   Bathing  independent   Dressing  independent   Assistive Device/Animal Currently Used at Home  none          Occupational Profile      Most Recent Value   Occupational History/Life Experiences  Pt. works as a clinical trial coordinator, (+) , enjoys knitting, journaling   Patient Goals  \"I want to be able to go out dancing\"          IRF OT Evaluation and Treatment - 04/10/21 0810        Time Calculation    Start Time  0810     Stop Time  0825     Time Calculation (min)  15 min        Session Details    Document Type  daily " treatment/progress note     Mode of Treatment  individual therapy;occupational therapy        Toilet Transfer    Transfer Technique  stand pivot     [place], Toilet Transfer  minimum assist (75% or more patient effort)     Assistive Device  commode, 3-in-1     Comment  min A SPT EOB to/from 3 in 1 commode placed bedside        Daily Progress Summary (OT)    Daily Outcome Statement (OT)  DME intervention for safe completion of toilet transfer. pt. completes min A SPT EOB to/from 3 in 1 commode                            IRF OT Goals      Most Recent Value   Transfer Goal 1   Activity/Assistive Device  toilet at 04/10/2021 0725   [place]  supervision required [cls] at 04/10/2021 0725   Time Frame  short-term goal (STG), 5 - 7 days at 04/10/2021 0725   Transfer Goal 2   Activity/Assistive Device  toilet at 04/10/2021 0725   [place]  modified independence at 04/10/2021 0725   Time Frame  long-term goal (LTG), 2 weeks at 04/10/2021 0725   Transfer Goal 3   Activity/Assistive Device  shower at 04/10/2021 0725   Time Frame  short-term goal (STG), 5 - 7 days at 04/10/2021 0725   Strategies/Barriers  TBA at 04/10/2021 0725   Transfer Goal 4   Activity/Assistive Device  shower at 04/10/2021 0725   Time Frame  long-term goal (LTG), 2 weeks at 04/10/2021 0725   Bathing Goal 1   Activity/Assistive Device  bathing skills, all at 04/10/2021 0725   [place]  minimum assist (75% or more patient effort) at 04/10/2021 0725   Time Frame  short-term goal (STG), 5 - 7 days at 04/10/2021 0725   Bathing Goal 2   Activity/Assistive Device  bathing skills, all at 04/10/2021 0725   [place]  supervision required at 04/10/2021 0725   Time Frame  long-term goal (LTG), 2 weeks at 04/10/2021 0725   UB Dressing Goal 1   Activity/Assistive Device  upper body dressing at 04/10/2021 0725   [place]  supervision required at 04/10/2021 0725   Time Frame  short-term goal (STG), 5 - 7 days at 04/10/2021 0725   UB Dressing Goal  2   Activity/Assistive Device  upper body dressing at 04/10/2021 0725   [place]  modified independence at 04/10/2021 0725   Time Frame  long-term goal (LTG), 2 weeks at 04/10/2021 0725   LB Dressing Goal 1   Activity/Assistive Device  lower body dressing at 04/10/2021 0725   [place]  moderate assist (50-74% patient effort) at 04/10/2021 0725   Time Frame  short-term goal (STG), 5 - 7 days at 04/10/2021 0725   LB Dressing Goal 2   Activity/Assistive Device  lower body dressing at 04/10/2021 0725   [place]  supervision required at 04/10/2021 0725   Time Frame  long-term goal (LTG), 2 weeks at 04/10/2021 0725   Grooming Goal 1   Activity/Assistive Device  grooming skills, all at 04/10/2021 0725   [place]  supervision required at 04/10/2021 0725   Time Frame  short-term goal (STG), 5 - 7 days at 04/10/2021 0725   Grooming Goal 2   Activity/Assistive Device  grooming skills, all at 04/10/2021 0725   [place]  modified independence at 04/10/2021 0725   Time Frame  long-term goal (LTG), 2 weeks at 04/10/2021 0725   Toileting Goal 1   Activity/Assistive Device  toileting skills, all at 04/10/2021 0725   [place]  supervision required [Cl S] at 04/10/2021 0725   Time Frame  short-term goal (STG), 5 - 7 days at 04/10/2021 0725   Toileting Goal 2   Activity/Assistive Device  toileting skills, all at 04/10/2021 0725   [place]  modified independence at 04/10/2021 0725   Time Frame  long-term goal (LTG), 5 - 7 days at 04/10/2021 0725

## 2021-04-10 NOTE — PROGRESS NOTES
Patient: Lili Serrato  Location: [facility] [address]  MRN: [id number]  Today's date: 4/10/2021    History of Present Illness  Lili is a 65 y.o. female admitted on 4/9/2021 with Status post bilateral knee replacements [Z96.653]. Principal problem is Status post bilateral knee replacements. Pt with hx of bilateral knee pain due to DJD.  Pain did not respond to conservative management.  Pt admitted to [facility] and underwent bilateral knee arthroplasty by Dr. Youngblood.  Patient was allowed weightbearing as tolerated bilateral lower extremity.      Past Medical History  Lili has a past medical history of Arthritis, Asthma, Depression, GERD (gastroesophageal reflux disease), Hypothyroidism, and Lipid disorder.    PT Vitals    Date/Time Pulse BP BP Location BP Method Pt Position [facility]   04/10/21 1158 81 139/75 Left upper arm Automatic Sitting BES      PT Pain    Date/Time Pain Type Pref Pain Scale Side Location Rating: Rest Rating: Activity Inter[facility]   04/10/21 1158 Pain Reassessment number (Numeric Rating Pain Scale) Bilateral knee 5 5 position adjusted returned to room where she would utilize ice during break BES          Prior Living Environment      Most Recent Value   Living Arrangements  house   Location, Patient Bedroom  second floor, must negotiate stairs to access   Location, Bathroom  second floor, must negotiate stairs to access   Bathroom Access Comment  Pt. reports tub shower, no grab bars or DME          Prior Level of Function      Most Recent Value   Dominant Hand  right   Ambulation  independent   Transferring  independent   Toileting  independent   Bathing  independent   Dressing  independent   Assistive Device/Animal Currently Used at Home  none           04/10/21 1135   Time Calculation   Start Time 1135   Stop Time 1205   Time Calculation (min) 30 min   Session Details   Document Type daily treatment/progress note   Mode of Treatment physical  therapy;individual therapy   General Information   Patient Profile Reviewed? yes   Existing Precautions/Restrictions fall   Sit to Stand Transfer   [place], Sit to Stand Transfer touching/steadying assist   Assistive Device walker, front-wheeled   Comment cues for posture & hand placement   Stand to Sit Transfer   [place], Stand to Sit Transfer touching/steadying assist   Assistive Device walker, front-wheeled   Comment cues to keep feet planted and allow for knee flexion   Stand Pivot Transfer   [place], Stand Pivot/Stand Step Transfer touching/steadying assist   Assistive Device walker, front-wheeled   Comment cues to stay within NELDA of AD   Car Transfer   Comment to be assessed once ROM improves   Gait Training   [place], Gait touching/steadying assist;close supervision   Assistive Device walker, front-wheeled   Distance in Feet 50 feet   Gait Pattern Utilized step-through   Deviations/Abnormal Patterns (Gait) antalgic;t[place] decreased;gait speed decreased;step length decreased;bilateral deviations   Bilateral Gait Deviations heel strike decreased   Comment cues for shoulder depression and fluidity of gait; pt noted to have decreased attention to objects on R side   Transfer Goal 1   Activity/Assistive Device sit-to-stand/stand-to-sit;stand pivot;car transfer;walker, front-wheeled   [place] supervision required;verbal cues required   Time Frame short-term goal (STG);1 week   Transfer Goal 2   Activity/Assistive Device sit-to-stand/stand-to-sit;car transfer;walker, front-wheeled   [place] modified independence   Time Frame long-term goal (LTG);2 weeks   Gait/Walking Locomotion Goal 1   Activity/Assistive Device gait (walking locomotion);assistive device use   Distance 200 feet   [place] supervision required;verbal cues required   Time Frame short-term goal (STG);1 week   Gait/Walking Locomotion Goal 2   Activity/Assistive Device gait (walking locomotion);assistive device use    Distance 200 feet   [place] modified independence   Time Frame short-term goal (STG);2 weeks   Daily Progress Summary (PT)   Daily Outcome Statement (PT) Pt benefits from introduction of bimanual support from RW for transfers & overground ambuation. Pt noted to have decreased attention to objects in R environment with ambulation & will benefit from continued training for safety.                        IRF PT Goals      Most Recent Value   Bed Mobility Goal 1   Activity/Assistive Device  rolling to left, rolling to right, sit to supine, supine to sit at 04/10/2021 0833   [place]  modified independence at 04/10/2021 0833   Time Frame  short-term goal (STG), 1 week at 04/10/2021 0833   Bed Mobility Goal 2   Activity/Assistive Device  rolling to left, rolling to right, sit to supine, supine to sit at 04/10/2021 0833   [place]  modified independence at 04/10/2021 0833   Time Frame  long-term goal (LTG), 2 weeks [STG = LTG] at 04/10/2021 0833   Stairs Goal 1   Activity/Assistive Device  ascending stairs, descending stairs, using handrail, left, cane, straight at 04/10/2021 0833   Number of Stairs  8 at 04/10/2021 0833   [place]  supervision required at 04/10/2021 0833   Time Frame  short-term goal (STG), 1 week at 04/10/2021 0833   Stairs Goal 2   Activity/Assistive Device  ascending stairs, descending stairs, using handrail, left, cane, straight at 04/10/2021 0833   Number of Stairs  12 at 04/10/2021 0833   [place]  modified independence at 04/10/2021 0833   Time Frame  long-term goal (LTG), 2 weeks at 04/10/2021 0833

## 2021-04-10 NOTE — PROGRESS NOTES
Patient: Lili Serrato  Location: [facility] [address]  MRN: [id number]  Today's date: 4/10/2021    History of Present Illness  Lili is a 65 y.o. female admitted on 4/9/2021 with Status post bilateral knee replacements [Z96.653]. Principal problem is Status post bilateral knee replacements. Pt with hx of bilateral knee pain due to DJD.  Pain did not respond to conservative management.  Pt admitted to [facility] and underwent bilateral knee arthroplasty by Dr. Youngblood.  Patient was allowed weightbearing as tolerated bilateral lower extremity.      Past Medical History  Lili has a past medical history of Arthritis, Asthma, Depression, GERD (gastroesophageal reflux disease), Hypothyroidism, and Lipid disorder.    PT Vitals    Date/Time Pulse BP BP Location BP Method Pt Position [facility]   04/10/21 1115 77 133/75 Left upper arm Automatic Lying supine with HOB elevated 30 degrees [facility]   04/10/21 1158 81 139/75 Left upper arm Automatic Sitting BES      PT Pain    Date/Time Pain Type Pref Pain Scale Side Location Rating: Rest Rating: Activity Interventions [facility]   04/10/21 1115 Pain Assessment number (Numeric Rating Pain Scale) Bilateral knee 3 3 premedicated for activity;position adjusted [facility]   04/10/21 1158 Pain Reassessment number (Numeric Rating Pain Scale) Bilateral knee 5 5 position adjusted returned to room where she would utilize ice during break BES          Prior Living Environment      Most Recent Value   Living Arrangements  house   Location, Patient Bedroom  second floor, must negotiate stairs to access   Location, Bathroom  second floor, must negotiate stairs to access   Bathroom Access Comment  Pt. reports tub shower, no grab bars or DME          Prior Level of Function      Most Recent Value   Dominant Hand  right   Ambulation  independent   Transferring  independent   Toileting  independent   Bathing  independent   Dressing  independent   Assistive Device/Animal Currently  Used at Home  none           04/10/21 0833   Time Calculation   Start Time 1105   Stop Time 1135   Time Calculation (min) 30 min   Session Details   Document Type initial evaluation   Mode of Treatment physical therapy;individual therapy   General Information   Patient Profile Reviewed? yes   General Observations of Patient pt received in bed, pleasant and cooperative, reporting discomfort vs pain in knees   Sensory   Additional Documentation Sensory Assessment (Somatosensory) (Group)   Sensory Assessment (Somatosensory)   Sensory Assessment (Somatosensory) left LE;right LE   Left LE Sensory Assessment light touch awareness;impaired;proprioception;intact   Right LE Sensory Assessment light touch awareness;impaired;proprioception;intact   Range of Motion (ROM)   Range of Motion left lower extremity ROM deficit;right lower extremity ROM deficit   Left Lower Extremity (ROM) left LE ROM is WFL except;knee   Knee, Left (ROM) 4-85   Right Lower Extremity (ROM) right LE ROM is WFL except;knee   Knee, Right (ROM) 6-80   Strength (Manual Muscle Testing)   Strength (Manual Muscle Testing) left lower extremity strength deficit;right lower extremity strength deficit   Left Lower Extremity Strength hip;knee;ankle   Hip, Left (Strength) flexion 3+/5, abduction 3+/5, extension 3+/5   Knee, Left (Strength) flexion 3/5, extension 3/5   Ankle, Left (Strength) DF 4/5, PF 3+/5, inv/ev 4/5   Right Lower Extremity Strength hip;knee;ankle   Hip, Right (Strength) flexion 3+/5, abduction 3+/5, extension 3+/5   Knee, Right (Strength) flexion 3/5, extension 3/5   Ankle, Right (Strength) DF 4/5, PF 3+/5, inv/ev 4/5   Bed Mobility   [place], Roll Left supervision   [place], Roll Right supervision   [place], Supine to Sit close supervision   [place], Sit to Supine minimum assist (75% or more patient effort)  (to clear R LE)   Assistive Device (Bed Mobility) none   Transfers   Transfers stand pivot transfer   Bed to Chair  Transfer   [place], Bed to Chair unable to assess   Comment PTA pt was independent without an AD. Unsafe to attempt transfers without introduction of AD at this time due to balance, strength, and ROM deficits.   Chair to Bed Transfer   [place], Chair to Bed unable to assess   Comment PTA pt was independent without an AD. Unsafe to attempt transfers without introduction of AD at this time due to balance, strength, and ROM deficits.   Sit to Stand Transfer   [place], Sit to Stand Transfer unable to assess   Comment PTA pt was independent without an AD. Unsafe to attempt transfers without introduction of AD at this time due to balance, strength, and ROM deficits.   Stand to Sit Transfer   [place], Stand to Sit Transfer unable to assess   Comment PTA pt was independent without an AD. Unsafe to attempt transfers without introduction of AD at this time due to balance, strength, and ROM deficits.   Stand Pivot Transfer   [place], Stand Pivot/Stand Step Transfer unable to assess   Comment PTA pt was independent without an AD. Unsafe to attempt transfers without introduction of AD at this time due to balance, strength, and ROM deficits.   Car Transfer   Comment Unable to assess at this time due to balance, strength and ROM deficits.   Gait Training   [place], Gait unable to assess   [place], Picking Up Object unable to assess  (unsafe to have pt attempt at this time due to balance)   Comment PTA pt was independent without an AD. Unsafe to attempt transfers without introduction of AD at this time due to balance, strength, and ROM deficits.   Stairs Training   [place], Stairs touching/steadying assist   Assistive Device railing   Handrail Location both sides   Number of Stairs 6   Stair Height 4 inches   Ascending Stairs Technique step-to-step   Descending Stairs Technique step-to-step   Comment up/down shorter steps for practice however pt with appropriate ROM and likely able to  "progress to 6\" stairs within next few sessions; cues for posture & to decrease compensatory motions; while ascending stairs, PT noticed large fluid filled blister on posterior R leg (at renetta from proximal portion of where MILIND stockings compressed leg) RN notified   Wheelchair Mobility/Management   Functional Mobility Training forward propulsion   [place], Forward Propulsion dependent (less than 25% patient effort)   Distance Propelled in Feet 45 feet   Comment, Functional Mobility pt D pushed in w/c by PT; w/c mobility not a goal for d/c; shortened leg rests, padded foot rests, and adjusted elevation of legrests for comfort & improved knee positioning   Balance   Balance Assessment sitting static balance;sitting dynamic balance;standing static balance;standing dynamic balance   Static Sitting Balance unsupported;sitting, edge of bed;WFL   Dynamic Sitting Balance unsupported;sitting, edge of bed;mild impairment   Static Standing Balance unsupported;standing;moderate impairment   Dynamic Standing Balance unsupported;standing;severe impairment   Motor Skills   Motor Skills coordination;functional endurance;muscle tone;postural deviations   Coordination bilateral;lower extremity;heel to shin;severe impairment  (due to ROM deficit)   Functional Endurance fair; tolerates household distances for ambulation with AD   Muscle Tone WNL   Postural Deviations   Postural Deviations pelvis   Pelvis posterior pelvic tilt   IRF PT Goals   Bed Mobility Goal Selection (PT-IRF) bed mobility, PT goal 1;bed mobility, PT goal 2   Stairs Goal Selection (PT-IRF) stairs, PT goal 1;stairs, PT goal 2   Bed Mobility Goal 1   Activity/Assistive Device rolling to left;rolling to right;sit to supine;supine to sit   [place] modified independence   Time Frame short-term goal (STG);1 week   Bed Mobility Goal 2   Activity/Assistive Device rolling to left;rolling to right;sit to supine;supine to sit   [place] modified independence   Time " "Frame long-term goal (LTG);2 weeks  (STG = LTG)   Stairs Goal 1   Activity/Assistive Device ascending stairs;descending stairs;using handrail, left;cane, straight   Number of Stairs 8   [place] supervision required   Time Frame short-term goal (STG);1 week   Stairs Goal 2   Activity/Assistive Device ascending stairs;descending stairs;using handrail, left;cane, straight   Number of Stairs 12   [place] modified independence   Time Frame long-term goal (LTG);2 weeks   Patient/Family Goals   Patient's Goals For Discharge other (see comments)  (\"I want to go dancing again.\")   Therapy Assessment/Plan (PT)   Rehab Potential/Prognosis (PT) good, to achieve stated therapy goals   Frequency of Treatment (PT) 5-7 times per week;other (see comments)  ( minutes per day)   Estimated Duration of Therapy/Length of Stay (PT) 2 weeks   Planned Therapy Interventions (PT) balance training;bed mobility training;gait training;home exercise program;manual therapy techniques;neuromuscular re-education;patient/family education;ROM (range of motion);stair training;strengthening;stretching;transfer training   Daily Progress Summary (PT)   Daily Outcome Statement (PT) Pt is a 65 y.o. female s/p elective B TKA. PTA she was independent with ADLs without an AD. At this time pt would require bilateral support from an AD to safely perform mobility. Pt is currently limited by B knee pain, limited B knee ROM, decreased B LE strength, impaired dynamic balance, and decreased activity tolerance. She is a motivated and engaged rehab participant and will benefit from a comprehensive inpatient rehab program to maximize safety & independence with functional mobility prior to d/c home.                     Education provided this session. See the Patient Education summary report for full details.    IRF PT Goals      Most Recent Value   Bed Mobility Goal 1   Activity/Assistive Device  rolling to left, rolling to right, sit to supine, supine " to sit at 04/10/2021 0833   [place]  modified independence at 04/10/2021 0833   Time Frame  short-term goal (STG), 1 week at 04/10/2021 0833   Bed Mobility Goal 2   Activity/Assistive Device  rolling to left, rolling to right, sit to supine, supine to sit at 04/10/2021 0833   [place]  modified independence at 04/10/2021 0833   Time Frame  long-term goal (LTG), 2 weeks [STG = LTG] at 04/10/2021 0833   Stairs Goal 1   Activity/Assistive Device  ascending stairs, descending stairs, using handrail, left, cane, straight at 04/10/2021 0833   Number of Stairs  8 at 04/10/2021 0833   [place]  supervision required at 04/10/2021 0833   Time Frame  short-term goal (STG), 1 week at 04/10/2021 0833   Stairs Goal 2   Activity/Assistive Device  ascending stairs, descending stairs, using handrail, left, cane, straight at 04/10/2021 0833   Number of Stairs  12 at 04/10/2021 0833   [place]  modified independence at 04/10/2021 0833   Time Frame  long-term goal (LTG), 2 weeks at 04/10/2021 0833

## 2021-04-10 NOTE — PROGRESS NOTES
History of present illness:      Patient is a 65-year-old female with history of bilateral knee pain due to DJD.  Pain not responding to conservative management.  Patient was admitted to [facility] and underwent bilateral knee arthroplasty by Dr. Youngblood.  Postoperatively he was started on aspirin 325 g twice daily for DVT prevention.  Per records patient does not tolerate Percocet currently on tramadol for pain control.  Patient was allowed weightbearing castrated bilateral lower extremity.     Patient was evaluated by physical medicine rehabilitation deemed to be a good candidate for acute rehabilitation.  Patient now transferred to [facility] also for comprehensive acute inpatient rehabitation admitted on April 9, 2021.     Scheduled Meds:  • acetaminophen  650 mg oral q6h GILL   • amLODIPine  10 mg oral Nightly   • aspirin  325 mg oral BID with meals   • bisacodyL  10 mg rectal Daily (7p)   • buPROPion XL  300 mg oral Daily   • cetirizine  5 mg oral Nightly   • cholecalciferol (vitamin D3)  1,000 Units oral Daily with dinner   • docusate sodium  100 mg oral BID   • levothyroxine  25 mcg oral Daily (6:30a)   • lidocaine  2 patch Topical Daily   • pantoprazole  20 mg oral Daily before breakfast   • polyethylene glycol  17 g oral Daily   • rosuvastatin  10 mg oral Daily (6p)   • senna  3 tablet oral Daily with lunch   • sertraline  200 mg oral Daily     Continuous Infusions:  PRN Meds:.•  acetaminophen  •  albuterol HFA  •  alum-mag hydroxide-simeth  •  carboxymethylcellulose  •  cyclobenzaprine  •  diphenhydrAMINE  •  magnesium hydroxide  •  traMADoL  •  trimethobenzamide     Lab Results   Component Value Date    WBC 8.25 04/10/2021    HGB 10.0 (L) 04/10/2021    HCT 30.0 (L) 04/10/2021    MCV 94.3 04/10/2021     04/10/2021       Lab Results   Component Value Date    GLUCOSE 112 (H) 04/10/2021    CALCIUM 8.4 (L) 04/10/2021     04/10/2021    K 3.7 04/10/2021    CO2 30 04/10/2021     " 04/10/2021    BUN 14 04/10/2021    CREATININE 0.5 (L) 04/10/2021     Subjective: Patient seen and examined no chest pain or shortness of breath, denied dizziness or lightheadedness or fever.  Patient is ready for therapies.  Patient had a good night sleep.      Physical examination:     His examination today showed a 65-year-old female no acute distress.  Patient awake alert oriented x3.     Blood pressure 130/73, pulse 83, temperature 37.3 °C (99.1 °F), temperature source Oral, resp. rate 18, height 1.588 m (5' 2.5\"), weight 102 kg (224 lb 4.8 oz), SpO2 94 %.     Head normocephalic, sclera nonicteric, mucous brains moist.     Neck supple     Heart regular S1-S2     Lungs respiration nonlabored     GI examination was benign.     Musculoskeletal exam: Passive range of motion within functional limit bilateral upper extremity.  Examination of bilateral lower extremity range of motion bilateral knee about -10-80 degrees.  Patient with good quad function bilaterally.  There was no evidence of calf tenderness or foot drop.     Neuro exam: Mental status as above patient able to obey simple commands.  Cranial nerve examination shows face symmetric, tongue midline, ocular motility intact and visual fields are full.  Motor examination bilateral upper extremity 5/5.  Examination of bilateral lower extremity exam was suboptimal due to recent bilateral knee surgery there was hip and ankle 5/5.  Sensory examination grossly normal.  Deep tendon reflexes were symmetrical bilateral upper extremity.  Gait not tested at this time.     Impression:     Ambulatory ADL dysfunction due to:     DJD status post bilateral knee arthroplasty, patient is weightbearing as tolerated bilateral lower extremity.  Patient on aspirin for DVT prevention.  Patient on tramadol for pain control.     Postoperative anemia     History of hypertension on Norvasc     Hypothyroidism on Synthroid     History of depression on Wellbutrin and " Zoloft     Hyperlipidemia on Crestor     GERD on Protonix     History of asthma on albuterol HFA as needed    Morbid obesity, followed by dietition        Plan:     Patient will continue physical therapy and Occupational Therapy.  We will consult Dr. Orozco for medical management, patient also be followed by Dr. Bonilla psychiatry.  Patient be followed by psychology and case management for support.  Patient will receive 24-hour application nursing for bowel and bladder management, pain monitoring and education.  Rehabitation precautions include cardiac and for precautions.  Will monitor routine labs in addition we will monitor healing of bilateral knee incision healing closely.     Goals: Improve overall function for transfers, ambulation, ADLs and elevation.     Extent of length of stay about 10 days     Discussed case with Dr. Orozco medical consultant, patient and nursing     This patient note has been dictated using speech recognition software.  Inadvertent speech recognition errors should be disregarded. Please do not hesitate to call my office for clarification.     Discussed with patient, Dr. Orozco medical consultant and nursing.     This patient note has been dictated using speech recognition software.  Inadvertent speech recognition errors should be disregarded. Please do not hesitate to call my office for clarification.

## 2021-04-10 NOTE — HOSPITAL COURSE
History of Present Illness  Lili is a 65 y.o. female admitted on 4/9/2021 with Status post bilateral knee replacements [Z96.653]. Principal problem is Status post bilateral knee replacements. Pt with hx of bilateral knee pain due to DJD.  Pain did not respond to conservative management.  Pt admitted to [facility] and underwent bilateral knee arthroplasty by Dr. Youngblood.  Patient was allowed weightbearing as tolerated bilateral lower extremity.      Past Medical History  Lili has a past medical history of Arthritis, Asthma, Depression, GERD (gastroesophageal reflux disease), Hypothyroidism, and Lipid disorder.

## 2021-04-11 ENCOUNTER — APPOINTMENT (INPATIENT)
Dept: OCCUPATIONAL THERAPY | Facility: REHABILITATION | Age: 66
DRG: 560 | End: 2021-04-11
Payer: COMMERCIAL

## 2021-04-11 ENCOUNTER — APPOINTMENT (INPATIENT)
Dept: PHYSICAL THERAPY | Facility: REHABILITATION | Age: 66
DRG: 560 | End: 2021-04-11
Payer: COMMERCIAL

## 2021-04-11 LAB — SARS-COV-2 RNA RESP QL NAA+PROBE: NEGATIVE

## 2021-04-11 PROCEDURE — 97530 THERAPEUTIC ACTIVITIES: CPT | Mod: GP | Performed by: PHYSICAL THERAPIST

## 2021-04-11 PROCEDURE — 12800000 HC ROOM AND CARE SEMIPRIVATE REHAB

## 2021-04-11 PROCEDURE — 63700000 HC SELF-ADMINISTRABLE DRUG: Performed by: INTERNAL MEDICINE

## 2021-04-11 PROCEDURE — U0003 INFECTIOUS AGENT DETECTION BY NUCLEIC ACID (DNA OR RNA); SEVERE ACUTE RESPIRATORY SYNDROME CORONAVIRUS 2 (SARS-COV-2) (CORONAVIRUS DISEASE [COVID-19]), AMPLIFIED PROBE TECHNIQUE, MAKING USE OF HIGH THROUGHPUT TECHNOLOGIES AS DESCRIBED BY CMS-2020-01-R: HCPCS | Performed by: PHYSICAL MEDICINE & REHABILITATION

## 2021-04-11 PROCEDURE — 97535 SELF CARE MNGMENT TRAINING: CPT | Mod: GO

## 2021-04-11 PROCEDURE — 97110 THERAPEUTIC EXERCISES: CPT | Mod: GP | Performed by: PHYSICAL THERAPIST

## 2021-04-11 PROCEDURE — 97116 GAIT TRAINING THERAPY: CPT | Mod: GP | Performed by: PHYSICAL THERAPIST

## 2021-04-11 PROCEDURE — 97110 THERAPEUTIC EXERCISES: CPT | Mod: GO

## 2021-04-11 PROCEDURE — 97530 THERAPEUTIC ACTIVITIES: CPT | Mod: GO

## 2021-04-11 RX ADMIN — BUPROPION HYDROCHLORIDE 300 MG: 150 TABLET, FILM COATED, EXTENDED RELEASE ORAL at 08:00

## 2021-04-11 RX ADMIN — LIDOCAINE 2 PATCH: 246 PATCH TOPICAL at 08:01

## 2021-04-11 RX ADMIN — TRAMADOL HYDROCHLORIDE 50 MG: 50 TABLET, COATED ORAL at 18:46

## 2021-04-11 RX ADMIN — DOCUSATE SODIUM 100 MG: 100 CAPSULE, LIQUID FILLED ORAL at 09:10

## 2021-04-11 RX ADMIN — PANTOPRAZOLE SODIUM 20 MG: 20 TABLET, DELAYED RELEASE ORAL at 08:01

## 2021-04-11 RX ADMIN — AMLODIPINE BESYLATE 10 MG: 5 TABLET ORAL at 20:35

## 2021-04-11 RX ADMIN — CETIRIZINE HYDROCHLORIDE 5 MG: 5 TABLET ORAL at 20:35

## 2021-04-11 RX ADMIN — TRAMADOL HYDROCHLORIDE 50 MG: 50 TABLET, COATED ORAL at 02:30

## 2021-04-11 RX ADMIN — ACETAMINOPHEN 650 MG: 325 TABLET, FILM COATED ORAL at 02:30

## 2021-04-11 RX ADMIN — LEVOTHYROXINE SODIUM 25 MCG: 25 TABLET ORAL at 05:39

## 2021-04-11 RX ADMIN — SERTRALINE 200 MG: 50 TABLET, FILM COATED ORAL at 08:00

## 2021-04-11 RX ADMIN — ACETAMINOPHEN 650 MG: 325 TABLET, FILM COATED ORAL at 11:45

## 2021-04-11 RX ADMIN — TRAMADOL HYDROCHLORIDE 50 MG: 50 TABLET, COATED ORAL at 23:56

## 2021-04-11 RX ADMIN — TRAMADOL HYDROCHLORIDE 50 MG: 50 TABLET, COATED ORAL at 08:01

## 2021-04-11 RX ADMIN — ACETAMINOPHEN 650 MG: 325 TABLET, FILM COATED ORAL at 18:47

## 2021-04-11 RX ADMIN — ACETAMINOPHEN 650 MG: 325 TABLET, FILM COATED ORAL at 23:56

## 2021-04-11 RX ADMIN — TRAMADOL HYDROCHLORIDE 50 MG: 50 TABLET, COATED ORAL at 12:21

## 2021-04-11 RX ADMIN — ROSUVASTATIN CALCIUM 10 MG: 5 TABLET, FILM COATED ORAL at 18:46

## 2021-04-11 RX ADMIN — ACETAMINOPHEN 650 MG: 325 TABLET, FILM COATED ORAL at 05:39

## 2021-04-11 RX ADMIN — Medication 1000 UNITS: at 16:33

## 2021-04-11 RX ADMIN — ASPIRIN 325 MG: 325 TABLET, COATED ORAL at 16:33

## 2021-04-11 RX ADMIN — ASPIRIN 325 MG: 325 TABLET, COATED ORAL at 08:01

## 2021-04-11 NOTE — PLAN OF CARE
Plan of Care Review  RETS20 IRF Plan of Care Review: progress ongoing, continue  Plan of Care Reviewed With: patient  Outcome Summary: Resident alert and oriented. Use rolling walker to use toilet , continent of bowel and bladder. Pain managed with multimodel approach.

## 2021-04-11 NOTE — PROGRESS NOTES
Patient: Lili Serrato  Location: [facility] [address]  MRN: [id number]  Today's date: 4/11/2021    History of Present Illness  Lili is a 65 y.o. female admitted on 4/9/2021 with Status post bilateral knee replacements [Z96.653]. Principal problem is Status post bilateral knee replacements. Pt with hx of bilateral knee pain due to DJD.  Pain did not respond to conservative management.  Pt admitted to [facility] and underwent bilateral knee arthroplasty by Dr. Youngblood.  Patient was allowed weightbearing as tolerated bilateral lower extremity.      Past Medical History  Lili has a past medical history of Arthritis, Asthma, Depression, GERD (gastroesophageal reflux disease), Hypothyroidism, and Lipid disorder.      OT Vitals    Date/Time Pulse BP BP Location BP Method Pt Position Who   04/11/21 1102 84 130/67 Left upper arm Automatic Sitting AM      OT Pain    Date/Time Pain Type Pref Pain Scale Side Orientation Location Onset/Duration Who   04/11/21 1102 Pain Assessment number (Numeric Rating Pain Scale) right knee 2 position adjusted AM   04/11/21 1115 Pain Reassessment number (Numeric Rating Pain Scale) left knee 3 diversional activity provided AM   04/11/21 1157 Post Activity number (Numeric Rating Pain Scale) bilateral knee 2 premedicated for activity AM          Prior Living Environment      Most Recent Value   People in Home  child(gm), adult   Living Arrangements  house   Location, Patient Bedroom  second floor, must negotiate stairs to access   Location, Bathroom  second floor, must negotiate stairs to access   Bathroom Access Comment  Pt. reports tub shower, no grab bars or DME          Prior Level of Function      Most Recent Value   Dominant Hand  right   Ambulation  independent   Transferring  independent   Toileting  independent   Bathing  independent   Dressing  independent   Assistive Device/Animal Currently Used at Home  none          Occupational Profile       "Most Recent Value   Occupational History/Life Experiences  Pt. works as a clinical trial coordinator, (+) , enjoys knitting, journaling   Patient Goals  \"I want to be able to go out dancing\"          IRF OT Evaluation and Treatment - 04/11/21 1101        OT Time Calculation    Start Time  1100     Stop Time  1200     Time Calculation (min)  60 min        Session Details    Document Type  daily treatment/progress note     Mode of Treatment  individual therapy;occupational therapy        General Information    General Observations of Patient  pt. received in therapy gym, pleasant/cooperative        Sit to Stand Transfer    [place], Sit to Stand Transfer  touching/steadying assist     Verbal Cues  safety;technique     Assistive Device  none     Comment  from w/c to stance at anterior table        Stand to Sit Transfer    [place], Stand to Sit Transfer  touching/steadying assist     Verbal Cues  technique     Assistive Device  none     Comment  to w/c from standng at anterior table        Toilet Transfer    Transfer Technique  stand pivot     [place], Toilet Transfer  minimum assist (75% or more patient effort)     Verbal Cues  hand placement     Assistive Device  grab bars/safety frame    accessible height toilet    Comment  amb approach c min steadying A, emerging touching A and RW w/c to/from accessible height toilet in pt. bathroom        Safety Issues, Functional Mobility    Comment, Safety Issues/Impairments (Mobility)  OT: min steadying A/emerging steadying A for functional ambulation within pt. room for toilet transfer        Balance    Comment, Balance  touching A in stance to complete laundry follding task. in stance approx 3 mins prior to request to go to bathroom        Therapeutic Interventions    Comment, Therapeutic Intervention  PSFS administered, pt. ID activities of walking, climbing stairs, driving, hosuehold chores and feeding cat as areas pt. would have difficulty in s/p B TKA. " Scores are as follows: walking (2/10), climb stairs (1/10), drive (0/10) household chores (ie. wash dishes and make coffee) (0/10),  feed cat (0/10)         Shoulder (Therapeutic Exercise)    Shoulder (Therapeutic Exercise)  strengthening exercise     Shoulder Strengthening (Therapeutic Exercise)  bilateral;flexion;aBduction;sitting;resistance band;3 sets;10 repetitions    orange theraband       Elbow/Forearm (Therapeutic Exercise)    Elbow/Forearm (Therapeutic Exercise)  strengthening exercise     Elbow/Forearm Strengthening (Therapeutic Exercise)  bilateral;flexion;extension;sitting;resistance band;3 sets;10 repetitions    orange theraputty       Aerobic Exercise    Type (Aerobic Exercise)  arm bike     Time Performed (Aerobic Exercise)  5     Comment (Aerobic Exercise)  pt. completes 5 forward on moderate resistance        Grooming    Self-Performance  washes, rinses and dries hands     [place]  set up;touching/steadying assist     Position  unsupported standing     Adaptive Equipment  none     Comment  in stance at sink side c RW, pt. washes, rinses and diries hands        Toileting    [place]  minimum assist (75% or more patient effort)     Position  supported sitting;unsupported standing     Adaptive Equipment  accessible height toilet;grab bar/safety frame     Comment  pt. continent of bowel and bladder. completes hygiene in stance c use of safety frames and steadying A. Pt. requires initiation of CM up 2* underwear and pants falling slightly below knees, able to complete CM s A        Laundry (IADLs)    Laundry  --    folding    Laundry, Impairments (IADLs)  balance;endurance/activity tolerance        Daily Progress Summary (OT)    Daily Outcome Statement (OT)  pt. seen for OT session c emphasis on standing balance, UB therex/core strengthening, and toileting. Pt. in stance c touching A to complete laundry folding task c no LOB. Pt. progressing to min A amb level toilet transfer c use of RW,  emerging touching A. Pt. would benefit from ongoing standing balance/endurance and transfer training. Con't OT POC                     IRF OT Goals      Most Recent Value   Transfer Goal 1   Activity/Assistive Device  toilet at 04/10/2021 0725   [place]  supervision required [cls] at 04/10/2021 0725   Time Frame  short-term goal (STG), 5 - 7 days at 04/10/2021 0725   Transfer Goal 2   Activity/Assistive Device  toilet at 04/10/2021 0725   [place]  modified independence at 04/10/2021 0725   Time Frame  long-term goal (LTG), 2 weeks at 04/10/2021 0725   Transfer Goal 3   Activity/Assistive Device  shower at 04/10/2021 0725   Time Frame  short-term goal (STG), 5 - 7 days at 04/10/2021 0725   Strategies/Barriers  TBA at 04/10/2021 0725   Transfer Goal 4   Activity/Assistive Device  shower at 04/10/2021 0725   Time Frame  long-term goal (LTG), 2 weeks at 04/10/2021 0725   Bathing Goal 1   Activity/Assistive Device  bathing skills, all at 04/10/2021 0725   [place]  minimum assist (75% or more patient effort) at 04/10/2021 0725   Time Frame  short-term goal (STG), 5 - 7 days at 04/10/2021 0725   Bathing Goal 2   Activity/Assistive Device  bathing skills, all at 04/10/2021 0725   [place]  supervision required at 04/10/2021 0725   Time Frame  long-term goal (LTG), 2 weeks at 04/10/2021 0725   UB Dressing Goal 1   Activity/Assistive Device  upper body dressing at 04/10/2021 0725   [place]  supervision required at 04/10/2021 0725   Time Frame  short-term goal (STG), 5 - 7 days at 04/10/2021 0725   UB Dressing Goal 2   Activity/Assistive Device  upper body dressing at 04/10/2021 0725   [place]  modified independence at 04/10/2021 0725   Time Frame  long-term goal (LTG), 2 weeks at 04/10/2021 0725   LB Dressing Goal 1   Activity/Assistive Device  lower body dressing at 04/10/2021 0725   [place]  moderate assist (50-74% patient effort) at 04/10/2021 0725   Time Frame  short-term goal (STG), 5 -  7 days at 04/10/2021 0725   LB Dressing Goal 2   Activity/Assistive Device  lower body dressing at 04/10/2021 0725   [place]  supervision required at 04/10/2021 0725   Time Frame  long-term goal (LTG), 2 weeks at 04/10/2021 0725   Grooming Goal 1   Activity/Assistive Device  grooming skills, all at 04/10/2021 0725   [place]  supervision required at 04/10/2021 0725   Time Frame  short-term goal (STG), 5 - 7 days at 04/10/2021 0725   Grooming Goal 2   Activity/Assistive Device  grooming skills, all at 04/10/2021 0725   [place]  modified independence at 04/10/2021 0725   Time Frame  long-term goal (LTG), 2 weeks at 04/10/2021 0725   Toileting Goal 1   Activity/Assistive Device  toileting skills, all at 04/10/2021 0725   [place]  supervision required [Cl S] at 04/10/2021 0725   Time Frame  short-term goal (STG), 5 - 7 days at 04/10/2021 0725   Toileting Goal 2   Activity/Assistive Device  toileting skills, all at 04/10/2021 0725   [place]  modified independence at 04/10/2021 0725   Time Frame  long-term goal (LTG), 5 - 7 days at 04/10/2021 0725

## 2021-04-11 NOTE — PROGRESS NOTES
[facility] Progress Note          Patient was seen and examined at bedside.    Subjective:   Saw her in am, her b/l knee pain is manageable, denies paresthesia or muscle spasms, started her PT/OT tolerated well, has had good BM with enhanced bowel regimen, encouraged her to keep good oral hydration and diet fiber supplement, reviewed her labs, benign.   Denies nausea, vomiting, abdominal pain or discomfort, dysuria, cough/sputum, running nose, sore throat, chest pain, palpitation, SOB or orthopnea, dizziness or LH,  HA.        Objective   Vital signs in last 24 hours:  Temp:  [36.5 °C (97.7 °F)-36.8 °C (98.2 °F)] 36.5 °C (97.7 °F)  Heart Rate:  [76-90] 90  Resp:  [18] 18  BP: (117-139)/(57-75) 131/66    No intake or output data in the 24 hours ending 04/11/21 0915  Intake/Output this shift:  No intake/output data recorded.   Review of Systems:  All other systems reviewed and negative except as noted in the HPI.   Objective      Labs  reviewed his labs thoroughly   Lab Results   Component Value Date    WBC 8.25 04/10/2021    HGB 10.0 (L) 04/10/2021    HCT 30.0 (L) 04/10/2021    MCV 94.3 04/10/2021     04/10/2021     Lab Results   Component Value Date    GLUCOSE 112 (H) 04/10/2021    CALCIUM 8.4 (L) 04/10/2021     04/10/2021    K 3.7 04/10/2021    CO2 30 04/10/2021     04/10/2021    BUN 14 04/10/2021    CREATININE 0.5 (L) 04/10/2021       Imaging  OSH imaging study reports reviewed       Full Code    Physical Exam:  Head/Ear/Nose/Throat: normocephalic; atraumatic; moisture mouth mm, no oropharyngeal thrush noted.   Eyes: anicteric sclera, EOMI; PERRL.   Neck : supple, no JVD, no carotid bruits appeciated.   Respiratory: no evidence of labored breathing, lung sounds CTA b/l, good aeration bibasilar area, no w/r/c.   Cardiovascular: RRR; normal S1, S2; no m/r/g; no S3 or S4.   Gastrointestinal: soft; NT; BS normal; mildly distended; no CVAT b/l.   Genitourinary: no graham.    Extremities : s/p b/l TKA, incisional site with dressing, focal c/d/i .   Neurological: AO x 3, fluent speeches, following commands, CNS II-XII grossly intact; no focal neurologic deficits.   Behavior/Emotional: in NAD, appropriate; cooperative.   Skin: clean, dry and intact.     Plan of care was discussed with patient, RN, and PMR attending     Assessment   CC: B/l knee severe DJD, failed conservative treatments, s/p b/l TKA, ambulatory dysfunction.     65 y.o.female, with PMH of obesity, HTN, HLD, hypothyroidism, anxiety/depression, GERD, seasonal allergy, h/o asthma, who has advanced b/l knee DJD, failed multiple outpatient conservative treatments, associated with progressively worsening b/l knee pain and difficulty with her ambulation, admitted to [facility] on 4/6/21 and underwent an elective b/l TKA . w/o procedure related complications, transferred to [facility] on 4/9/21 for post op inpatient acute rehab to address her ambulatory dysfunction. Her post op course was not complicated, post op acute pain , on prn tramadol, she seemed not tolerating oxycodone well, post op anemia but no need PRBCm H/H stable, on  mg bid for weeks for post op b/l LE DVT prophylaxis.     1. advanced b/l knee DJD, failed conservative treatments, progressively worsening b/l knee pain with ambulatory dysfunction,  s/p elective B/L TKA : inpt rehab, pain management, DVT prophylaxis, surgical site wound care/dermal defense, f/u with orthopedic as scheduled.     2. post op acute pain: pain management with titration based on regular pain scale evaluation, provide topical Lidoderm patch ,  ice packs, prn muscle relaxant.     3. post op blood loss anemia: f/u H/H, no need PRBC based on current H/H level, po iron with Vit C.     4. GI-constipation, GERD: provide constipation bowel regimen, po hydration, fiber intake, timed toilet visits.  PPI for GERD.     5. DVT prophy: SCD, TEDs, early ambulation, po ecotrin per ortho, check LE venous DUS to  r/o DVT.       6. HTN, Dyslipidemia: on amlodipine and statin. Orthostatic hypotension prevention.     7. -urinary retention: timed voiding, monitor PVR with cic, check UA and UCX.     8. Hypothyroidism: cont home dose of levothyroxine.     9. Anxiety, depression: cont wellbutrin and zoloft, consult psychiatrist and psychologist CBT.     Billing code: [id number]  Diagnoses:  Patient Active Problem List   Diagnosis   • Osteoarthritis of knees, bilateral   • Status post total bilateral knee replacement   • HTN (hypertension)   • Hypercholesterolemia   • Hypothyroidism   • Depression   • Asthma   • Seasonal allergies   • GERD (gastroesophageal reflux disease)   • Obesity   • Status post bilateral knee replacements   • Leg swelling   complicated case with multiple comorbidities as mentioned in subjective section, spent 35 min to manage the case, >50% of the time consulting the patient about current medical condition, existing comorbidities, new findings/concerns and care/management plan.            Aj Orozco MD  4/11/2021

## 2021-04-11 NOTE — PROGRESS NOTES
Patient: Lili Serrato  Location: [facility] [address]  MRN: [id number]  Today's date: 4/11/2021    History of Present Illness  Lili is a 65 y.o. female admitted on 4/9/2021 with Status post bilateral knee replacements [Z96.653]. Principal problem is Status post bilateral knee replacements. Pt with hx of bilateral knee pain due to DJD.  Pain did not respond to conservative management.  Pt admitted to [facility] and underwent bilateral knee arthroplasty by Dr. Youngblood.  Patient was allowed weightbearing as tolerated bilateral lower extremity.      Past Medical History  Lili has a past medical history of Arthritis, Asthma, Depression, GERD (gastroesophageal reflux disease), Hypothyroidism, and Lipid disorder.      PT Vitals    Date/Time Pulse HR Source Pt Activity O2 Therapy BP BP Location BP Method Pt Position [facility]   04/11/21 1401 82 Monitor At rest None (Room air) 127/66 Left upper arm Automatic Sitting BK      PT Pain    Date/Time Pain Type Pref Pain Scale Side Orientation Location Rating: Rest Onset/Duration [facility]   04/11/21 1401 Pain Assessment number (Numeric Rating Pain Scale) bilateral knee 1 4 position adjusted BK   04/11/21 1459 Pain Reassessment number (Numeric Rating Pain Scale) bilateral knee 1 -- cold applied BK          Prior Living Environment      Most Recent Value   People in Home  child(gm), adult   Living Arrangements  house   Location, Patient Bedroom  second floor, must negotiate stairs to access   Location, Bathroom  second floor, must negotiate stairs to access   Bathroom Access Comment  Pt. reports tub shower, no grab bars or DME          Prior Level of Function      Most Recent Value   Dominant Hand  right   Ambulation  independent   Transferring  independent   Toileting  independent   Bathing  independent   Dressing  independent   Assistive Device/Animal Currently Used at Home  none          IRF PT Evaluation and Treatment - 04/11/21 1401        PT  Time Calculation    Start Time  1400     Stop Time  1500     Time Calculation (min)  60 min        Session Details    Document Type  daily treatment/progress note     Mode of Treatment  physical therapy;individual therapy        General Information    Patient Profile Reviewed  yes     General Observations of Patient  Pt alert and awake, in NAD. Pt 4/10 pain in B knees. Pt agreeable to therapy session.     Existing Precautions/Restrictions  fall        Range of Motion (ROM)    Range of Motion  left lower extremity ROM deficit;right lower extremity ROM deficit     Left Lower Extremity (ROM)  knee     Knee, Left (ROM)  0-102     Right Lower Extremity (ROM)  right LE ROM is WFL except;knee     Knee, Right (ROM)  2-89        Bed Mobility    [place], Supine to Sit  close supervision;verbal cues     Verbal Cues (Supine to Sit)  safety;technique     [place], Sit to Supine  close supervision;verbal cues     Verbal Cues (Sit to Supine)  technique;safety     Comment (Bed Mobility)  Bed mobility performed on therapy mat. Pt lowered her trunk posterior and to her R while lifting her B LEs onto the therapy mat. Verbal cues for improved use of momentum of trunk lowering to lift B LEs onto the therapy mat. Pt performed supine to sit by lifting her B LEs off the edge of the mat while lifting her trunk anterior and to her L. Verbal cues for improved technique to lift LEs off of the edge of the mat.        Sit to Stand Transfer    [place], Sit to Stand Transfer  touching/steadying assist;verbal cues     Verbal Cues  safety;technique     Assistive Device  walker, front-wheeled     Comment  Steadying Assist for improved balance correction. Verbal cues for increased upright posture.        Stand to Sit Transfer    [place], Stand to Sit Transfer  touching/steadying assist;verbal cues     Verbal Cues  safety;technique     Comment  Steadying Assist for improved balance correction. Verbal cues for improved controlled  lowering of pelvis.        Stand Pivot Transfer    [place], Stand Pivot/Stand Step Transfer  touching/steadying assist;verbal cues     Verbal Cues  safety;technique     Assistive Device  walker, front-wheeled     Comment  Intermittent steadying assist for improved balance correction. Verbal cues for improved RW management and improved proximity / alignment to seat surface.        Gait Training    [place], Gait  touching/steadying assist;verbal cues     Assistive Device  walker, front-wheeled     Distance in Feet  25 feet     Pattern (Gait)  step-through     Deviations/Abnormal Patterns (Gait)  trey decreased;step length decreased;stride length decreased     Bilateral Gait Deviations  heel strike decreased     Comment (Gait/Stairs)  25' x 2 with RW. Steadying Assist for improved balance correction. Verbal cues for improved RW management and increased upright posture.        Hip (Therapeutic Exercise)    Hip (Therapeutic Exercise)  AROM (active range of motion)    Hip abduction / adduction x 15, bridging on bolster x 10.       Knee (Therapeutic Exercise)    Knee (Therapeutic Exercise)  AROM (active range of motion)    SAQ x 15, Heel slides AAROM x 10,       Modality Treatment    Treatment Location 1 (Modality)  Posterior aspect of B knees     Modality Performed  cryotherapy     Modality Treatment Results  Decreased pain     Comment, Modality Treatment  Ice pack to posterior aspect of B knees x 10'.        Daily Progress Summary (PT)    Progress Toward Functional Goals (PT)  progressing toward functional goals as expected     Daily Outcome Statement (PT)  Pt demonstrates improving PROM in B knee flexion and extension this session and requiring minimal cueing with supine Farzad post setup.     Impairments Continuing to Limit Function  decreased strength;impaired balance;decreased ROM;pain     Recommendations (PT)  B LE strengthening, B LE ROM / stretching, functiona mobility activities (bed mobility,  transfers, ambulation, stairs, ramp and curb) and balance activities.                     IRF PT Goals      Most Recent Value   Bed Mobility Goal 1   Activity/Assistive Device  rolling to left, rolling to right, sit to supine, supine to sit at 04/10/2021 0833   [place]  modified independence at 04/10/2021 0833   Time Frame  short-term goal (STG), 1 week at 04/10/2021 0833   Bed Mobility Goal 2   Activity/Assistive Device  rolling to left, rolling to right, sit to supine, supine to sit at 04/10/2021 0833   [place]  modified independence at 04/10/2021 0833   Time Frame  long-term goal (LTG), 2 weeks [STG = LTG] at 04/10/2021 0833   Transfer Goal 1   Activity/Assistive Device  sit-to-stand/stand-to-sit, stand pivot, car transfer, walker, front-wheeled at 04/10/2021 1135   [place]  supervision required, verbal cues required at 04/10/2021 1135   Time Frame  short-term goal (STG), 1 week at 04/10/2021 1135   Transfer Goal 2   Activity/Assistive Device  sit-to-stand/stand-to-sit, car transfer, walker, front-wheeled at 04/10/2021 1135   [place]  modified independence at 04/10/2021 1135   Time Frame  long-term goal (LTG), 2 weeks at 04/10/2021 1135   Gait/Walking Locomotion Goal 1   Activity/Assistive Device  gait (walking locomotion), assistive device use at 04/10/2021 1135   Distance  200 feet at 04/10/2021 1135   [place]  supervision required, verbal cues required at 04/10/2021 1135   Time Frame  short-term goal (STG), 1 week at 04/10/2021 1135   Gait/Walking Locomotion Goal 2   Activity/Assistive Device  gait (walking locomotion), assistive device use at 04/10/2021 1135   Distance  200 feet at 04/10/2021 1135   [place]  modified independence at 04/10/2021 1135   Time Frame  short-term goal (STG), 2 weeks at 04/10/2021 1135   Stairs Goal 1   Activity/Assistive Device  ascending stairs, descending stairs, using handrail, left, cane, straight at 04/10/2021 0833   Number of Stairs  8 at  04/10/2021 0833   [place]  supervision required at 04/10/2021 0833   Time Frame  short-term goal (STG), 1 week at 04/10/2021 0833   Stairs Goal 2   Activity/Assistive Device  ascending stairs, descending stairs, using handrail, left, cane, straight at 04/10/2021 0833   Number of Stairs  12 at 04/10/2021 0833   [place]  modified independence at 04/10/2021 0833   Time Frame  long-term goal (LTG), 2 weeks at 04/10/2021 0833

## 2021-04-11 NOTE — PROGRESS NOTES
Patient: Lili Serrato  Location: [facility] [address]  MRN: [id number]  Today's date: 4/11/2021    History of Present Illness  Lili is a 65 y.o. female admitted on 4/9/2021 with Status post bilateral knee replacements [Z96.653]. Principal problem is Status post bilateral knee replacements. Pt with hx of bilateral knee pain due to DJD.  Pain did not respond to conservative management.  Pt admitted to [facility] and underwent bilateral knee arthroplasty by Dr. Youngblood.  Patient was allowed weightbearing as tolerated bilateral lower extremity.      Past Medical History  Lili has a past medical history of Arthritis, Asthma, Depression, GERD (gastroesophageal reflux disease), Hypothyroidism, and Lipid disorder.      PT Vitals    Date/Time Pulse HR Source Pt Activity O2 Therapy BP BP Location BP Method Pt Position [facility]   04/11/21 0901 90 Monitor At rest None (Room air) 131/66 Left upper arm Automatic Sitting BK      PT Pain    Date/Time Pain Type Pref Pain Scale Location Rating: Rest Onset/Duration [facility]   04/11/21 0901 Pain Assessment number (Numeric Rating Pain Scale) 1 5 position adjusted BK   04/11/21 0959 Pain Reassessment number (Numeric Rating Pain Scale) 1 -- position adjusted BK          Prior Living Environment      Most Recent Value   People in Home  child(gm), adult   Living Arrangements  house   Location, Patient Bedroom  second floor, must negotiate stairs to access   Location, Bathroom  second floor, must negotiate stairs to access   Bathroom Access Comment  Pt. reports tub shower, no grab bars or DME          Prior Level of Function      Most Recent Value   Dominant Hand  right   Ambulation  independent   Transferring  independent   Toileting  independent   Bathing  independent   Dressing  independent   Assistive Device/Animal Currently Used at Home  none          IRF PT Evaluation and Treatment - 04/11/21 0901        PT Time Calculation    Start Time  0900     Stop  Time  1000     Time Calculation (min)  60 min        Session Details    Document Type  daily treatment/progress note        General Information    Patient Profile Reviewed  yes     General Observations of Patient  Pt alert and awake, in NAD. Pt reports 1/10 pain in B knees when seated at rest. Pt reports 3/10 pain in B knees with activity. Pt agreeable to therapy session. Pt agreeable to therapy session.     Existing Precautions/Restrictions  fall        Sit to Stand Transfer    [place], Sit to Stand Transfer  touching/steadying assist;verbal cues     Verbal Cues  safety;technique     Assistive Device  walker, front-wheeled     Comment  Steadying Assist for improved balance correction. Verbal cues for increased upright posture.        Stand to Sit Transfer    [place], Stand to Sit Transfer  touching/steadying assist;verbal cues     Verbal Cues  technique;safety     Assistive Device  walker, front-wheeled     Comment  Steadying Assist for improved balance correction. Verbal cues for improved controlled lowering of pelvis.        Stand Pivot Transfer    [place], Stand Pivot/Stand Step Transfer  touching/steadying assist;verbal cues     Verbal Cues  safety;technique;proper use of assistive device     Assistive Device  walker, front-wheeled     Comment  Intermittent steadying assist for improved balance correction. Verbal cues for improved RW management and improved proximity / alignment to seat surface.        Gait Training    [place], Gait  touching/steadying assist;verbal cues     Assistive Device  walker, front-wheeled     Distance in Feet  50 feet     Pattern (Gait)  step-through     Deviations/Abnormal Patterns (Gait)  trey decreased;step length decreased;stride length decreased     Bilateral Gait Deviations  heel strike decreased     Advanced Gait Activity  sloped surfaces;curb negotiation     Comment (Gait/Stairs)  50' x 2 with RW. Intermittent steadying assist for improved balance  "correction. Verbal cues for improved RW management and increased upright posture.        Curb Negotiation    [place]  touching/steadying assist;verbal cues     Assistive Device  walker, front-wheeled     Curb Height  6 inches     Comment  Up/down 6\" curb with RW. Steadying assist for improved balance correction when transitioning RW onto / off of curb step. Verbal cues for improved RW management and improved proximity to curb step prior to transitioning RW onto curb step. Min A Up/down 2 consecutive 6\" curb steps with RW. Min manual cues for improved balance correction when transitioning RW onto / off of curb steps. Verbal cues for improved RW management and improved sequencing of task.        Sloped Surface Gait Skills    [place]  touching/steadying assist;verbal cues     Assistive Device  walker, front-wheeled     Distance in Feet  3.5 feet     Comment  Up/down 3.5' ramp with RW. Intermittent steadying assist for improved balance correction. Verbal cues for improved RW management and improved proximity to edge of ramp prior to transitioning RW onto / off of ramp.        Stairs Training    [place], Stairs  touching/steadying assist     Assistive Device  railing     Handrail Location (Stairs)  both sides     Number of Stairs  4     Stair Height  6 inches     Ascending Stairs Technique  step-to-step     Descending Stairs Technique  step-to-step     Comment  Up/down 4(6\") steps with B HRs via step-to pattern (L LE leading when ascending and R LE leading when descending.        Hip (Therapeutic Exercise)    Hip (Therapeutic Exercise)  other (see comments)    Marching x 30       Knee (Therapeutic Exercise)    Knee (Therapeutic Exercise)  other (see comments)    LAQ x 15       Ankle (Therapeutic Exercise)    Ankle (Therapeutic Exercise)  other (see comments)    AP x 30.       Modality Treatment    Treatment Location 1 (Modality)  Posterior aspect of B knees     Modality Performed  cryotherapy     Modality " Treatment Results  Decreased pain     Comment, Modality Treatment  Ice pack to posterior aspect of B knees x 10'.        Daily Progress Summary (PT)    Progress Toward Functional Goals (PT)  progressing toward functional goals as expected     Daily Outcome Statement (PT)  Pt progressed to negotiation of curb step and ramp this session requiring assistance to correct LOB with each activity due to impaired / delayed balance strategies. Pt progressed to neogtiation of 2 consecutive curb steps with RW due to current home setup (2 RADHA w/o HR in garage). Pt requires assistance with this activity due to decreased strength and impaired balance placing pt at a high risk of falls.     Impairments Continuing to Limit Function  decreased strength;impaired balance;decreased ROM;pain     Recommendations (PT)  B LE strengthening, B LE ROM / stretching, functiona mobility activities (bed mobility, transfers, ambulation, stairs, ramp and curb) and balance activities.                     IRF PT Goals      Most Recent Value   Bed Mobility Goal 1   Activity/Assistive Device  rolling to left, rolling to right, sit to supine, supine to sit at 04/10/2021 0833   [place]  modified independence at 04/10/2021 0833   Time Frame  short-term goal (STG), 1 week at 04/10/2021 0833   Bed Mobility Goal 2   Activity/Assistive Device  rolling to left, rolling to right, sit to supine, supine to sit at 04/10/2021 0833   [place]  modified independence at 04/10/2021 0833   Time Frame  long-term goal (LTG), 2 weeks [STG = LTG] at 04/10/2021 0833   Transfer Goal 1   Activity/Assistive Device  sit-to-stand/stand-to-sit, stand pivot, car transfer, walker, front-wheeled at 04/10/2021 1135   [place]  supervision required, verbal cues required at 04/10/2021 1135   Time Frame  short-term goal (STG), 1 week at 04/10/2021 1135   Transfer Goal 2   Activity/Assistive Device  sit-to-stand/stand-to-sit, car transfer, walker, front-wheeled at 04/10/2021  1135   [place]  modified independence at 04/10/2021 1135   Time Frame  long-term goal (LTG), 2 weeks at 04/10/2021 1135   Gait/Walking Locomotion Goal 1   Activity/Assistive Device  gait (walking locomotion), assistive device use at 04/10/2021 1135   Distance  200 feet at 04/10/2021 1135   [place]  supervision required, verbal cues required at 04/10/2021 1135   Time Frame  short-term goal (STG), 1 week at 04/10/2021 1135   Gait/Walking Locomotion Goal 2   Activity/Assistive Device  gait (walking locomotion), assistive device use at 04/10/2021 1135   Distance  200 feet at 04/10/2021 1135   [place]  modified independence at 04/10/2021 1135   Time Frame  short-term goal (STG), 2 weeks at 04/10/2021 1135   Stairs Goal 1   Activity/Assistive Device  ascending stairs, descending stairs, using handrail, left, cane, straight at 04/10/2021 0833   Number of Stairs  8 at 04/10/2021 0833   [place]  supervision required at 04/10/2021 0833   Time Frame  short-term goal (STG), 1 week at 04/10/2021 0833   Stairs Goal 2   Activity/Assistive Device  ascending stairs, descending stairs, using handrail, left, cane, straight at 04/10/2021 0833   Number of Stairs  12 at 04/10/2021 0833   [place]  modified independence at 04/10/2021 0833   Time Frame  long-term goal (LTG), 2 weeks at 04/10/2021 0833

## 2021-04-12 ENCOUNTER — APPOINTMENT (INPATIENT)
Dept: PHYSICAL THERAPY | Facility: REHABILITATION | Age: 66
DRG: 560 | End: 2021-04-12
Payer: COMMERCIAL

## 2021-04-12 ENCOUNTER — APPOINTMENT (INPATIENT)
Dept: WOUND CARE | Facility: REHABILITATION | Age: 66
DRG: 560 | End: 2021-04-12
Payer: COMMERCIAL

## 2021-04-12 ENCOUNTER — APPOINTMENT (OUTPATIENT)
Dept: PSYCHOLOGY | Facility: CLINIC | Age: 66
End: 2021-04-12
Attending: PHYSICAL MEDICINE & REHABILITATION
Payer: COMMERCIAL

## 2021-04-12 ENCOUNTER — APPOINTMENT (INPATIENT)
Dept: CARDIOLOGY | Facility: REHABILITATION | Age: 66
DRG: 560 | End: 2021-04-12
Attending: INTERNAL MEDICINE
Payer: COMMERCIAL

## 2021-04-12 ENCOUNTER — APPOINTMENT (INPATIENT)
Dept: OCCUPATIONAL THERAPY | Facility: REHABILITATION | Age: 66
DRG: 560 | End: 2021-04-12
Payer: COMMERCIAL

## 2021-04-12 LAB — BSA FOR ECHO PROCEDURE: 2.12 M2

## 2021-04-12 PROCEDURE — 93970 EXTREMITY STUDY: CPT

## 2021-04-12 PROCEDURE — 97116 GAIT TRAINING THERAPY: CPT | Mod: GP

## 2021-04-12 PROCEDURE — 97530 THERAPEUTIC ACTIVITIES: CPT | Mod: GP | Performed by: PHYSICAL THERAPIST

## 2021-04-12 PROCEDURE — 97110 THERAPEUTIC EXERCISES: CPT | Mod: GP

## 2021-04-12 PROCEDURE — 90791 PSYCH DIAGNOSTIC EVALUATION: CPT | Performed by: CLINICAL NEUROPSYCHOLOGIST

## 2021-04-12 PROCEDURE — 97530 THERAPEUTIC ACTIVITIES: CPT | Mod: GP

## 2021-04-12 PROCEDURE — 97116 GAIT TRAINING THERAPY: CPT | Mod: GP | Performed by: PHYSICAL THERAPIST

## 2021-04-12 PROCEDURE — 97110 THERAPEUTIC EXERCISES: CPT | Mod: GP | Performed by: PHYSICAL THERAPIST

## 2021-04-12 PROCEDURE — 12800000 HC ROOM AND CARE SEMIPRIVATE REHAB

## 2021-04-12 PROCEDURE — 63700000 HC SELF-ADMINISTRABLE DRUG: Performed by: INTERNAL MEDICINE

## 2021-04-12 PROCEDURE — 97535 SELF CARE MNGMENT TRAINING: CPT | Mod: GO

## 2021-04-12 RX ADMIN — ROSUVASTATIN CALCIUM 10 MG: 5 TABLET, FILM COATED ORAL at 18:02

## 2021-04-12 RX ADMIN — DOCUSATE SODIUM 100 MG: 100 CAPSULE, LIQUID FILLED ORAL at 09:10

## 2021-04-12 RX ADMIN — AMLODIPINE BESYLATE 10 MG: 5 TABLET ORAL at 21:48

## 2021-04-12 RX ADMIN — PANTOPRAZOLE SODIUM 20 MG: 20 TABLET, DELAYED RELEASE ORAL at 09:10

## 2021-04-12 RX ADMIN — BUPROPION HYDROCHLORIDE 300 MG: 150 TABLET, FILM COATED, EXTENDED RELEASE ORAL at 09:09

## 2021-04-12 RX ADMIN — CETIRIZINE HYDROCHLORIDE 5 MG: 5 TABLET ORAL at 21:49

## 2021-04-12 RX ADMIN — DOCUSATE SODIUM 100 MG: 100 CAPSULE, LIQUID FILLED ORAL at 21:48

## 2021-04-12 RX ADMIN — Medication 1000 UNITS: at 16:56

## 2021-04-12 RX ADMIN — SERTRALINE 200 MG: 50 TABLET, FILM COATED ORAL at 09:08

## 2021-04-12 RX ADMIN — TRAMADOL HYDROCHLORIDE 50 MG: 50 TABLET, COATED ORAL at 18:02

## 2021-04-12 RX ADMIN — ASPIRIN 325 MG: 325 TABLET, COATED ORAL at 09:10

## 2021-04-12 RX ADMIN — TRAMADOL HYDROCHLORIDE 50 MG: 50 TABLET, COATED ORAL at 22:55

## 2021-04-12 RX ADMIN — TRAMADOL HYDROCHLORIDE 50 MG: 50 TABLET, COATED ORAL at 13:20

## 2021-04-12 RX ADMIN — ASPIRIN 325 MG: 325 TABLET, COATED ORAL at 16:56

## 2021-04-12 RX ADMIN — TRAMADOL HYDROCHLORIDE 50 MG: 50 TABLET, COATED ORAL at 09:11

## 2021-04-12 RX ADMIN — ACETAMINOPHEN 650 MG: 325 TABLET, FILM COATED ORAL at 05:40

## 2021-04-12 RX ADMIN — ACETAMINOPHEN 650 MG: 325 TABLET, FILM COATED ORAL at 12:20

## 2021-04-12 RX ADMIN — LEVOTHYROXINE SODIUM 25 MCG: 25 TABLET ORAL at 05:40

## 2021-04-12 RX ADMIN — ACETAMINOPHEN 650 MG: 325 TABLET, FILM COATED ORAL at 18:03

## 2021-04-12 ASSESSMENT — COGNITIVE AND FUNCTIONAL STATUS - GENERAL
MOOD: MOTIVATED;HOPEFUL
REMOTE MEMORY: WNL
LIBIDO: NON-CONTRIBUTORY
AFFECT: FULL RANGE
APPETITE: NO CHANGE
APPEARANCE: WELL GROOMED
CONCENTRATION: WNL
PSYCHOMOTOR FUNCTIONING: FATIGUED
SLEEP_WAKE_CYCLE: DECREASED
THOUGHT_CONTENT: APPROPRIATE
IMPULSE CONTROL: INTACT
ATTENTION: WNL
RECENT MEMORY: WNL
AROUSAL LEVEL: SLEEPY
ORIENTATION: FULLY ORIENTED
SPEECH: REGULAR
PERCEPTUAL FUNCTION: NORMAL;PAIN
THOUGHT_PROCESS: WNL
INSIGHT: INTACT
EST. PREMORBID INTELLIGENCE: AVERAGE
DELUSIONS: NONE OR AGE APPROPRIATE
EYE_CONTACT: WNL

## 2021-04-12 NOTE — PROGRESS NOTES
[facility] Progress Note          Patient was seen and examined at bedside.    Subjective:   4/11/21:  Saw her in am, her b/l knee pain is manageable, denies paresthesia or muscle spasms, started her PT/OT tolerated well, has had good BM with enhanced bowel regimen, encouraged her to keep good oral hydration and diet fiber supplement, reviewed her labs, benign.   Denies nausea, vomiting, abdominal pain or discomfort, dysuria, cough/sputum, running nose, sore throat, chest pain, palpitation, SOB or orthopnea, dizziness or LH,  HA.    4/12/21:  Stable, her pain is manageable, denies paresthesia, tolerated her PT/OT, her BM and voiding are fine.       Objective   Vital signs in last 24 hours:  Temp:  [36.6 °C (97.9 °F)-36.9 °C (98.4 °F)] 36.6 °C (97.9 °F)  Heart Rate:  [77-82] 81  Resp:  [18] 18  BP: (127-160)/(66-77) 142/77    No intake or output data in the 24 hours ending 04/12/21 1129  Intake/Output this shift:  No intake/output data recorded.   Review of Systems:  All other systems reviewed and negative except as noted in the HPI.   Objective      Labs  reviewed his labs thoroughly   Lab Results   Component Value Date    WBC 8.25 04/10/2021    HGB 10.0 (L) 04/10/2021    HCT 30.0 (L) 04/10/2021    MCV 94.3 04/10/2021     04/10/2021     Lab Results   Component Value Date    GLUCOSE 112 (H) 04/10/2021    CALCIUM 8.4 (L) 04/10/2021     04/10/2021    K 3.7 04/10/2021    CO2 30 04/10/2021     04/10/2021    BUN 14 04/10/2021    CREATININE 0.5 (L) 04/10/2021       Imaging  OSH imaging study reports reviewed       Full Code    Physical Exam:  Head/Ear/Nose/Throat: normocephalic; atraumatic; moisture mouth mm, no oropharyngeal thrush noted.   Eyes: anicteric sclera, EOMI; PERRL.   Neck : supple, no JVD, no carotid bruits appeciated.   Respiratory: no evidence of labored breathing, lung sounds CTA b/l, good aeration bibasilar area, no w/r/c.   Cardiovascular: RRR; normal S1, S2;  no m/r/g; no S3 or S4.   Gastrointestinal: soft; NT; BS normal; mildly distended; no CVAT b/l.   Genitourinary: no graham.   Extremities : s/p b/l TKA, incisional site with dressing, focal c/d/i .   Neurological: AO x 3, fluent speeches, following commands, CNS II-XII grossly intact; no focal neurologic deficits.   Behavior/Emotional: in NAD, appropriate; cooperative.   Skin: clean, dry and intact.     Plan of care was discussed with patient, RN, and PMR attending     Assessment   CC: B/l knee severe DJD, failed conservative treatments, s/p b/l TKA, ambulatory dysfunction.     65 y.o.female, with PMH of obesity, HTN, HLD, hypothyroidism, anxiety/depression, GERD, seasonal allergy, h/o asthma, who has advanced b/l knee DJD, failed multiple outpatient conservative treatments, associated with progressively worsening b/l knee pain and difficulty with her ambulation, admitted to [facility] on 4/6/21 and underwent an elective b/l TKA . w/o procedure related complications, transferred to [facility] on 4/9/21 for post op inpatient acute rehab to address her ambulatory dysfunction. Her post op course was not complicated, post op acute pain , on prn tramadol, she seemed not tolerating oxycodone well, post op anemia but no need PRBCm H/H stable, on  mg bid for weeks for post op b/l LE DVT prophylaxis.     1. advanced b/l knee DJD, failed conservative treatments, progressively worsening b/l knee pain with ambulatory dysfunction,  s/p elective B/L TKA : inpt rehab, pain management, DVT prophylaxis, surgical site wound care/dermal defense, f/u with orthopedic as scheduled.     2. post op acute pain: pain management with titration based on regular pain scale evaluation, provide topical Lidoderm patch ,  ice packs, prn muscle relaxant.     3. post op blood loss anemia: f/u H/H, no need PRBC based on current H/H level, po iron with Vit C.     4. GI-constipation, GERD: provide constipation bowel regimen, po hydration, fiber intake, timed  toilet visits.  PPI for GERD.     5. DVT prophy: SCD, TEDs, early ambulation, po ecotrin per ortho, check LE venous DUS to r/o DVT.       6. HTN, Dyslipidemia: on amlodipine and statin. Orthostatic hypotension prevention.     7. -urinary retention: timed voiding, monitor PVR with cic, check UA and UCX.     8. Hypothyroidism: cont home dose of levothyroxine.     9. Anxiety, depression: cont wellbutrin and zoloft, consult psychiatrist and psychologist CBT.     Billing code: [id number]  Diagnoses:  Patient Active Problem List   Diagnosis   • Osteoarthritis of knees, bilateral   • Status post total bilateral knee replacement   • HTN (hypertension)   • Hypercholesterolemia   • Hypothyroidism   • Depression   • Asthma   • Seasonal allergies   • GERD (gastroesophageal reflux disease)   • Obesity   • Status post bilateral knee replacements   • Leg swelling      Aj Orozco MD  4/12/2021

## 2021-04-12 NOTE — PROGRESS NOTES
Patient: Lili Serrato  Location: [facility] [address]  MRN: [id number]  Today's date: 4/12/2021    History of Present Illness  Lili is a 65 y.o. female admitted on 4/9/2021 with Status post bilateral knee replacements [Z96.653]. Principal problem is Status post bilateral knee replacements. Pt with hx of bilateral knee pain due to DJD.  Pain did not respond to conservative management.  Pt admitted to [facility] and underwent bilateral knee arthroplasty by Dr. Youngblood.  Patient was allowed weightbearing as tolerated bilateral lower extremity.      Past Medical History  Lili has a past medical history of Arthritis, Asthma, Depression, GERD (gastroesophageal reflux disease), Hypothyroidism, and Lipid disorder.          Prior Living Environment      Most Recent Value   People in Home  child(gm), adult   Living Arrangements  house   Location, Patient Bedroom  second floor, must negotiate stairs to access   Location, Bathroom  second floor, must negotiate stairs to access   Bathroom Access Comment  Pt. reports tub shower, no grab bars or DME          Prior Level of Function      Most Recent Value   Dominant Hand  right   Ambulation  independent   Transferring  independent   Toileting  independent   Bathing  independent   Dressing  independent   Assistive Device/Animal Currently Used at Home  none          IRF PT Evaluation and Treatment - 04/12/21 1405        PT Time Calculation    Start Time  1400     Stop Time  1500     Time Calculation (min)  60 min        Session Details    Document Type  daily treatment/progress note     Mode of Treatment  physical therapy;individual therapy        General Information    Patient Profile Reviewed  yes     General Observations of Patient  Pt. pleasant and motivated.  Reporting having trouble sleeping due to her R ankle being uncomfortable.     Existing Precautions/Restrictions  fall;head of bed elevated 30 degrees    sats>90 degrees        Range of Motion (ROM)    Range of Motion  left lower extremity ROM deficit;right lower extremity ROM deficit     Left Lower Extremity (ROM)  left LE ROM is WFL except;knee     Knee, Left (ROM)  3-105 degrees     Right Lower Extremity (ROM)  right LE ROM is WFL except;knee     Knee, Right (ROM)  5-95 degrees     Comment: Range of Motion  knee ext. measured supine, knee flexion measured sitting.        Bed Mobility    [place], Supine to Sit  close supervision;verbal cues     Verbal Cues (Supine to Sit)  technique;safety     [place], Sit to Supine  close supervision;verbal cues     Verbal Cues (Sit to Supine)  technique;safety     Assistive Device  none        Transfers    Transfers  stand pivot transfer     Maintains Weight-Bearing Status (Transfers)  able to maintain        Bed to Chair Transfer    [place], Bed to Chair  touching/steadying assist;verbal cues;nonverbal cues (demo/gesture)     Verbal Cues  hand placement;safety     Assistive Device  walker, front-wheeled        Sit to Stand Transfer    [place], Sit to Stand Transfer  touching/steadying assist;verbal cues;nonverbal cues (demo/gesture)     Verbal Cues  safety     Assistive Device  walker, front-wheeled     Comment  steadying assist at hips from bed, w/c or mat.        Stand to Sit Transfer    [place], Stand to Sit Transfer  touching/steadying assist;verbal cues;nonverbal cues (demo/gesture)     Verbal Cues  hand placement;safety     Assistive Device  walker, front-wheeled        Stand Pivot Transfer    [place], Stand Pivot/Stand Step Transfer  touching/steadying assist;verbal cues;nonverbal cues (demo/gesture)     Verbal Cues  hand placement;safety     Assistive Device  walker, front-wheeled     Comment  steadying assist at hips from bed, w/c or mat.        Gait Training    [place], Gait  touching/steadying assist;verbal cues;nonverbal cues (demo/gesture)     Assistive Device  walker, front-wheeled     Distance in Feet   100 feet     Pattern (Gait)  step-through     Deviations/Abnormal Patterns (Gait)  bilateral deviations;antalgic     Bilateral Gait Deviations  heel strike decreased     Comment (Gait/Stairs)  20' x 2, 4 x 100' with RW        Hip (Therapeutic Exercise)    Hip (Therapeutic Exercise)  AROM (active range of motion);isometric exercises     Hip AROM (Therapeutic Exercise)  bilateral;sitting;flexion;hamstring stretch;supine;aBduction;aDduction     Hip Isometrics (Therapeutic Exercise)  bilateral;gluteal sets;10 repetitions;3 sets     Comment (Hip Therapeutic Exercise)  hip abd/adduction on powder board.        Knee (Therapeutic Exercise)    Knee (Therapeutic Exercise)  AROM (active range of motion);AAROM (active assistive range of motion);isometric exercises     Knee AROM (Therapeutic Exercise)  bilateral;sitting;LAQ (long arc quad);hamstring stretch;flexion;extension;supine;heel slides;SAQ (short arc quad)    AAROM HS using strap    Knee AAROM (Therapeutic Exercise)  bilateral;supine;10 repetitions;3 sets     Knee Isometrics (Therapeutic Exercise)  bilateral;gluteal sets;quad sets;10 repetitions;3 sets     Comment (Knee Therapeutic Exercise)  stretches 3 x 30 sec hold .        Ankle (Therapeutic Exercise)    Ankle (Therapeutic Exercise)  AROM (active range of motion);PROM (passive range of motion)     Ankle AROM (Therapeutic Exercise)  bilateral;dorsiflexion;plantarflexion;supine;10 repetitions;3 sets     Ankle PROM (Therapeutic Exercise)  bilateral;dorsiflexion;sitting;5 repetitions;10 second hold     Comment (Ankle Therapeutic Exercise)  PROM heel cord stretch using a sheet.        Daily Progress Summary (PT)    Progress Toward Functional Goals (PT)  progressing toward functional goals as expected     Daily Outcome Statement (PT)  Pt. participated in seated and supine TKA TE's after initial gait training.  Knee PROM re-assessed and pt. with an increase in knee flexion, but a slight decreased in extension.  Patient able  to perform 30 reps of all AROM, isometric, and AAROM TE's, requiring some inital assistance for correct technique, but learning quickly.     Impairments Continuing to Limit Function  decreased strength;decreased ROM;pain     Recommendations (PT)  Continue per POC.                Education provided this session. See the Patient Education summary report for full details.    IRF PT Goals      Most Recent Value   Bed Mobility Goal 1   Activity/Assistive Device  rolling to left, rolling to right, sit to supine, supine to sit at 04/10/2021 0833   [place]  modified independence at 04/10/2021 0833   Time Frame  short-term goal (STG), 1 week at 04/10/2021 0833   Bed Mobility Goal 2   Activity/Assistive Device  rolling to left, rolling to right, sit to supine, supine to sit at 04/10/2021 0833   [place]  modified independence at 04/10/2021 0833   Time Frame  long-term goal (LTG), 2 weeks [STG = LTG] at 04/10/2021 0833   Transfer Goal 1   Activity/Assistive Device  sit-to-stand/stand-to-sit, stand pivot, car transfer, walker, front-wheeled at 04/10/2021 1135   [place]  supervision required, verbal cues required at 04/10/2021 1135   Time Frame  short-term goal (STG), 1 week at 04/10/2021 1135   Transfer Goal 2   Activity/Assistive Device  sit-to-stand/stand-to-sit, car transfer, walker, front-wheeled at 04/10/2021 1135   [place]  modified independence at 04/10/2021 1135   Time Frame  long-term goal (LTG), 2 weeks at 04/10/2021 1135   Gait/Walking Locomotion Goal 1   Activity/Assistive Device  gait (walking locomotion), assistive device use at 04/10/2021 1135   Distance  200 feet at 04/10/2021 1135   [place]  supervision required, verbal cues required at 04/10/2021 1135   Time Frame  short-term goal (STG), 1 week at 04/10/2021 1135   Gait/Walking Locomotion Goal 2   Activity/Assistive Device  gait (walking locomotion), assistive device use at 04/10/2021 1135   Distance  200 feet at 04/10/2021 1135    [place]  modified independence at 04/10/2021 1135   Time Frame  short-term goal (STG), 2 weeks at 04/10/2021 1135   Stairs Goal 1   Activity/Assistive Device  ascending stairs, descending stairs, using handrail, left, cane, straight at 04/10/2021 0833   Number of Stairs  8 at 04/10/2021 0833   [place]  supervision required at 04/10/2021 0833   Time Frame  short-term goal (STG), 1 week at 04/10/2021 0833   Stairs Goal 2   Activity/Assistive Device  ascending stairs, descending stairs, using handrail, left, cane, straight at 04/10/2021 0833   Number of Stairs  12 at 04/10/2021 0833   [place]  modified independence at 04/10/2021 0833   Time Frame  long-term goal (LTG), 2 weeks at 04/10/2021 0833

## 2021-04-12 NOTE — PROGRESS NOTES
Inpatient Psychology Initial Intake    Duration:  55 minutes    Lili Serrato, : 1955, a 65 y.o. female, for initial evaluation visit to discuss Adjustment to Disability.    HPI: Patient is a 65-year-old female with history of bilateral knee pain due to DJD.  Pain not responding to conservative management.  Patient was admitted to [facility] and underwent bilateral knee arthroplasty by Dr. Youngblood.  Postoperatively he was started on aspirin 325 g twice daily for DVT prevention.  Per records patient does not tolerate Percocet currently on tramadol for pain control.  Patient was allowed weightbearing castrated bilateral lower extremity.     Patient was evaluated by physical medicine rehabilitation deemed to be a good candidate for acute rehabilitation.  Patient now transferred to [facility] also for comprehensive acute inpatient rehabitation admitted on 2021.         Past Medical History:   Diagnosis Date   • Arthritis    • Asthma    • Depression    • GERD (gastroesophageal reflux disease)    • Hypothyroidism    • Lipid disorder      Past Surgical History:   Procedure Laterality Date   •  SECTION      x3   • HYSTERECTOMY     • TONSILLECTOMY       History reviewed. No pertinent family history.  Social History     Socioeconomic History   • Marital status:      Spouse name: None   • Number of children: 3   • Years of education: None   • Highest education level: Bachelor's degree (e.g., BA, AB, BS)   Occupational History   • None   Tobacco Use   • Smoking status: Never Smoker   • Smokeless tobacco: Never Used   Substance and Sexual Activity   • Alcohol use: Not Currently   • Drug use: Never   • Sexual activity: Never   Other Topics Concern   • None   Social History Narrative    Claudia is  with three adult children (two sons and one daughter). One of her sons lives with her. Her other two children live locally (her daughter with ex-). She works FT as a   for [employer], primarily remotely with pandemic. She enjoys spending time with family, dancing, and going to the [facility].      Social Determinants of Health     Financial Resource Strain:    • Difficulty of Paying Living Expenses:    Food Insecurity:    • Worried About Running Out of Food in the Last Year:    • Ran Out of Food in the Last Year:    Transportation Needs:    • Lack of Transportation (Medical):    • Lack of Transportation (Non-Medical):    Physical Activity:    • Days of Exercise per Week:    • Minutes of Exercise per Session:    Stress:    • Feeling of Stress :    Social Connections:    • Frequency of Communication with Friends and Family:    • Frequency of Social Gatherings with Friends and Family:    • Attends Protestant Services:    • Active Member of Clubs or Organizations:    • Attends Club or Organization Meetings:    • Marital Status:    Intimate Partner Violence:    • Fear of Current or Ex-Partner:    • Emotionally Abused:    • Physically Abused:    • Sexually Abused:        Current Legal Status:       Number of Arrests:      Previous Mental Health History:   Previous Mental Health Treatment:  Outpatient and Psychotropic Medications  Previous Suicidal Behavior:  N/A  Previous Self-Injurious Behavior:  N/A  Previous Homicidal Behavior:  N/A  Previous Substance Abuse Treatment: N/A    Current Facility-Administered Medications   Medication Dose Route Frequency Provider Last Rate Last Admin   • acetaminophen (TYLENOL) tablet 650 mg  650 mg oral q4h PRN Aj Orozco MD       • acetaminophen (TYLENOL) tablet 650 mg  650 mg oral q6h GILL Aj Orozco MD   650 mg at 04/12/21 1220   • albuterol HFA (VENTOLIN HFA) 90 mcg/actuation inhaler 2 puff  2 puff inhalation q6h PRN Aj Orozco MD       • alum-mag hydroxide-simeth (MAALOX) 200-200-20 mg/5 mL suspension 30 mL  30 mL oral q4h PRN Aj Orozco MD       • amLODIPine (NORVASC) tablet 10 mg  10 mg oral Nightly Aj Orozco  MD   10 mg at 04/11/21 2035   • aspirin EC tablet 325 mg  325 mg oral BID with meals Aj Orozco MD   325 mg at 04/12/21 0910   • bisacodyL (DULCOLAX) 10 mg suppository 10 mg  10 mg rectal Daily (7p) Aj Orozco MD       • buPROPion XL (WELLBUTRIN XL) 24 hr ER tablet 300 mg  300 mg oral Daily Aj Orozco MD   300 mg at 04/12/21 0909   • carboxymethylcellulose (REFRESH PLUS) 0.5 % ophthalmic dropperette 1 drop  1 drop Both Eyes 3x daily PRN Aj Orozco MD       • cetirizine (ZyrTEC) tablet 5 mg  5 mg oral Nightly Aj Orozco MD   5 mg at 04/11/21 2035   • cholecalciferol (vitamin D3) tablet 1,000 Units  1,000 Units oral Daily with dinner Aj Orozco MD   1,000 Units at 04/11/21 1633   • cyclobenzaprine (FLEXERIL) tablet 10 mg  10 mg oral 3x daily PRN Aj Orozco MD       • diphenhydrAMINE (BENADRYL) capsule 25 mg  25 mg oral q6h PRN Aj Orozco MD       • docusate sodium (COLACE) capsule 100 mg  100 mg oral BID Aj Orozco MD   100 mg at 04/12/21 0910   • levothyroxine (SYNTHROID) tablet 25 mcg  25 mcg oral Daily (6:30a) Aj Orozco MD   25 mcg at 04/12/21 0540   • lidocaine (ASPERCREME) 4 % topical patch 2 patch  2 patch Topical Daily Aj Orozco MD   2 patch at 04/11/21 0801   • magnesium hydroxide (M.O.M.) 400 mg/5 mL suspension 30 mL  30 mL oral 2x daily PRN Aj Orozco MD       • pantoprazole (PROTONIX) tablet,delayed release (DR/EC) 20 mg  20 mg oral Daily before breakfast Aj Orozco MD   20 mg at 04/12/21 0910   • polyethylene glycol (MIRALAX) 17 gram packet 17 g  17 g oral Daily Aj Orozco MD   17 g at 04/09/21 1819   • rosuvastatin (CRESTOR) tablet 10 mg  10 mg oral Daily (6p) Aj Orozco MD   10 mg at 04/11/21 1846   • senna (SENOKOT) tablet 3 tablet  3 tablet oral Daily with lunch Aj Orozco MD       • sertraline (ZOLOFT) tablet 200 mg  200 mg oral Daily Aj Orozco MD   200 mg at 04/12/21 0908   • traMADoL (ULTRAM) tablet 50 mg  50 mg oral q4h PRN  "Aj Orozco MD   50 mg at 04/12/21 1320   • trimethobenzamide (TIGAN) capsule 300 mg  300 mg oral 3x daily PRN Aj Orozco MD           Current Evaluation:   Mental Status Exam:       Assessments done this visit:     [place] Suicide Severity Rating Scale:  Done today  1. Within the past month, have you wished you were dead or wished you could go to sleep and not wake up?: No  2. Within the past month, have you actually had any thoughts of killing yourself?: No  6. Have you ever done anything, started to do anything, or prepared to do anything to end your life?: Yes  How long ago did you do any of these?: Over a year ago      Comments:     Risk Assessment:   Suicidal Ideation: Not Present  Self Injurious Behavior:  Not Present  Irritability:  Not Present  Homicidal Behavior: Not Present  Estimate of Risk:  None  If risk identified have suicide precautions been implemented? N/A    Interventions  Acceptance & Adjustment  Psychoeducation    Psychoeducation provided on:  Orthopedic Recovery     Recommendations:   Individual Therapy  30 minutesweekly    Goals     • Increase adjustment to rehabilitation facility.     • Maximize adjustment and acceptance of limitations           Visit Diagnosis:     1. Leg swelling    2. Adjustment disorder with mixed anxiety and depressed mood        Diagnostic Impression:   Weekly Outcome Statement: Seen for initial eval. Lili (Claudia) was awake and alert; she reported fatigue secondary to poor sleep at night (has trouble getting comfortable). She reported that her goals are to \"get back to dancing and walking on the [place] and the beach.\" Appears optimistic and motivated. By hx, she graduated from [employer]. 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 She has been working remotely with [employer]. Her adult son lives with her and she has another son and daughter. She is . Not in a current relationship. She has a history of anxiety and " "depression. She has a long standing relationship with an OP counselor/LCSW, with whom she only treats now on an as needed basis. She started counseling many years ago related to depresion/coping with her daughter's medical problems. SHe is prescribed psychotropic medications by her PCP. No substance abuse hx. Currently, She reported feeling \"excited\" about the surgery and getting \"Back to my life.\" She appears to have appopriate insight and expectations. Feels pain is relatively well controlled (trouble sleeping). Psychoeducation on acute rehab and role of psychology provided.        CHRISTIAN Troy.D  "

## 2021-04-12 NOTE — PROGRESS NOTES
Patient: Lili Serrato  Location: [facility] [address]  MRN: [id number]  Today's date: 4/12/2021    History of Present Illness  Lili is a 65 y.o. female admitted on 4/9/2021 with Status post bilateral knee replacements [Z96.653]. Principal problem is Status post bilateral knee replacements. Pt with hx of bilateral knee pain due to DJD.  Pain did not respond to conservative management.  Pt admitted to [facility] and underwent bilateral knee arthroplasty by Dr. Youngblood.  Patient was allowed weightbearing as tolerated bilateral lower extremity.      Past Medical History  Lili has a past medical history of Arthritis, Asthma, Depression, GERD (gastroesophageal reflux disease), Hypothyroidism, and Lipid disorder.      OT Vitals    Date/Time Pulse HR Source SpO2 Pt Activity O2 Therapy BP BP Location BP Method Pt Position Observations [facility]   04/12/21 0728 78 Monitor 96 % At rest None (Room air) 143/71 Left upper arm Automatic Lying OT pre; BP received from tech. [facility]   04/12/21 0828 -- -- -- -- -- -- -- -- -- OT post shower LAF      OT Pain    Date/Time Pain Type Pref Pain Scale Location Rating: Rest Onset/Duration [facility]   04/12/21 0728 Pain Assessment number (Numeric Rating Pain Scale) 3 -- premedicated for activity [facility]   04/12/21 0828 Pain Reassessment;Post Activity number (Numeric Rating Pain Scale) 1 1 premedicated for activity provided warm shower LAF          Prior Living Environment      Most Recent Value   People in Home  child(gm), adult   Living Arrangements  house   Location, Patient Bedroom  second floor, must negotiate stairs to access   Location, Bathroom  second floor, must negotiate stairs to access   Bathroom Access Comment  Pt. reports tub shower, no grab bars or DME          Prior Level of Function      Most Recent Value   Dominant Hand  right   Ambulation  independent   Transferring  independent   Toileting  independent   Bathing  independent   Dressing  independent  "  Assistive Device/Animal Currently Used at Home  none          Occupational Profile      Most Recent Value   Occupational History/Life Experiences  Pt. works as a clinical trial coordinator, (+) , enjoys knitting, journaling   Patient Goals  \"I want to be able to go out dancing\"          [facility] OT Evaluation and Treatment - 04/12/21 0728        OT Time Calculation    Start Time  0728     Stop Time  0828     Time Calculation (min)  60 min        Session Details    Document Type  daily treatment/progress note     Mode of Treatment  occupational therapy;individual therapy        General Information    Patient Profile Reviewed  yes     General Observations of Patient  received in bed     Existing Precautions/Restrictions  fall;head of bed elevated 30 degrees    sats >90       Sit to Stand Transfer    [place], Sit to Stand Transfer  close supervision;verbal cues     Verbal Cues  hand placement     Assistive Device  walker, enrique     Comment  EOB, WC, elevated toilet c HR        Stand to Sit Transfer    [place]ony, Stand to Sit Transfer  close supervision;verbal cues     Verbal Cues  hand placement     Assistive Device  walker, enrique     Comment  EOB, WC, elevated toilet c HR        Stand Pivot Transfer    Pony, Stand Pivot/Stand Step Transfer  close supervision     Verbal Cues  safety     Assistive Device  walker, front-wheeled        Toilet Transfer    Transfer Technique  stand pivot     Pony, Toilet Transfer  close supervision;verbal cues     Verbal Cues  hand placement;safety;technique     Assistive Device  grab bars/safety frame     Comment  ambulatory approach; accessible height toilet in bathroom;  Discussed consideration of commode over toilet at dc         Shower Transfer    Transfer Technique  stand pivot     Pony, Shower Transfer  touching/steadying assist     Verbal Cues  safety;proper use of assistive device;technique     Assistive Device  tub bench;grab bars/tub rail        " Safety Issues, Functional Mobility    Comment, Safety Issues/Impairments (Mobility)  OT: very close S for straight, touching/steadying for turns in bathroom c RW        Bathing    Self-Performance  lower body;upper body     [place] Assistance  left lower leg, including foot;right lower leg, including foot;other (see comments)    assist for bottoms of feet    [place]  minimum assist (75% or more patient effort)     Position  supported sitting     Setup Assistance  obtain supplies     Adaptive Equipment  grab bar/tub rail;hand-held shower spray hose;tub bench     Comment  has long handled sponge at home         Upper Body Dressing    Tasks  doff;don;bra/undergarment;pull over garment     Self-Performance  threads left arm, bra/undershirt;threads right arm, bra/undershirt;pulls bra/undershirt over head/around back;pulls bra/undershirt down/adjusts;threads left arm, shirt;threads right arm, shirt;pulls shirt over head/around back;pulls shirt down/adjusts     [place]  set up;supervision     Position  supported sitting     Adaptive Equipment  none        Lower Body Dressing    Tasks  doff;don     Self-Performance  threads left leg, underpants;threads right leg, underpants;pulls underpants up or down     [place] Assistance  dons/doffs right sock     [place]  minimum assist (75% or more patient effort)     Position  supported sitting     Adaptive Equipment  other (see comments)     [place], Footwear  other (see comments)     Comment  Pants NT 2* staff asked OT not to fully dress for LB US this am.  Pts feet c edema preventing shoe don.  Doffed B socks and donned L sock on bath bench using GB to support self anteriorly to reach feet.         Grooming    Self-Performance  washes, rinses and dries face;washes, rinses and dries hands;brushes/elena hair;applies deodorant     [place]  set up;supervision     Position  unsupported sitting     Setup Assistance  obtain supplies     Adaptive Equipment  none      Comment  face, hands in context of shower.  Sat for deodorant and hair care edge of bench.        Toileting    [place]  close supervision     Position  supported sitting     Setup Assistance  adaptive equipment setup     Adaptive Equipment  accessible height toilet;grab bar/safety frame     Comment  pt continent bladder. Discussed potential use of commode over toilet for support @ dc, pt in rental.         Daily Progress Summary (OT)    Daily Outcome Statement (OT)  Improving.  Pt completed full wet shower this am.  Very min A for bottoms of feet and don R sock seated on bath bench c GB for anterior support.  Close S RW to/from bathroom this am.  Instructed to perform adls and HHM intentionally/carefully.  Responsive to instruction.  Discussed commode over toilet at dc, pt in rental unit, short stature.  Cont c POC.  Cont assess needs for LB AE as pt progresses.                       IRF OT Goals      Most Recent Value   Transfer Goal 1   Activity/Assistive Device  toilet at 04/10/2021 0725   [place]  supervision required [cls] at 04/10/2021 0725   Time Frame  short-term goal (STG), 5 - 7 days at 04/10/2021 0725   Transfer Goal 2   Activity/Assistive Device  toilet at 04/10/2021 0725   [place]  modified independence at 04/10/2021 0725   Time Frame  long-term goal (LTG), 2 weeks at 04/10/2021 0725   Transfer Goal 3   Activity/Assistive Device  shower at 04/10/2021 0725   Time Frame  short-term goal (STG), 5 - 7 days at 04/10/2021 0725   Strategies/Barriers  TBA at 04/10/2021 0725   Transfer Goal 4   Activity/Assistive Device  shower at 04/10/2021 0725   Time Frame  long-term goal (LTG), 2 weeks at 04/10/2021 0725   Bathing Goal 1   Activity/Assistive Device  bathing skills, all at 04/10/2021 0725   [place]  minimum assist (75% or more patient effort) at 04/10/2021 0725   Time Frame  short-term goal (STG), 5 - 7 days at 04/10/2021 0725   Bathing Goal 2   Activity/Assistive Device  bathing skills,  all at 04/10/2021 0725   [place]  supervision required at 04/10/2021 0725   Time Frame  long-term goal (LTG), 2 weeks at 04/10/2021 0725   UB Dressing Goal 1   Activity/Assistive Device  upper body dressing at 04/10/2021 0725   [place]  supervision required at 04/10/2021 0725   Time Frame  short-term goal (STG), 5 - 7 days at 04/10/2021 0725   UB Dressing Goal 2   Activity/Assistive Device  upper body dressing at 04/10/2021 0725   [place]  modified independence at 04/10/2021 0725   Time Frame  long-term goal (LTG), 2 weeks at 04/10/2021 0725   LB Dressing Goal 1   Activity/Assistive Device  lower body dressing at 04/10/2021 0725   [place]  moderate assist (50-74% patient effort) at 04/10/2021 0725   Time Frame  short-term goal (STG), 5 - 7 days at 04/10/2021 0725   LB Dressing Goal 2   Activity/Assistive Device  lower body dressing at 04/10/2021 0725   [place]  supervision required at 04/10/2021 0725   Time Frame  long-term goal (LTG), 2 weeks at 04/10/2021 0725   Grooming Goal 1   Activity/Assistive Device  grooming skills, all at 04/10/2021 0725   [place]  supervision required at 04/10/2021 0725   Time Frame  short-term goal (STG), 5 - 7 days at 04/10/2021 0725   Grooming Goal 2   Activity/Assistive Device  grooming skills, all at 04/10/2021 0725   [place]  modified independence at 04/10/2021 0725   Time Frame  long-term goal (LTG), 2 weeks at 04/10/2021 0725   Toileting Goal 1   Activity/Assistive Device  toileting skills, all at 04/10/2021 0725   [place]  supervision required [Cl S] at 04/10/2021 0725   Time Frame  short-term goal (STG), 5 - 7 days at 04/10/2021 0725   Toileting Goal 2   Activity/Assistive Device  toileting skills, all at 04/10/2021 0725   [place]  modified independence at 04/10/2021 0725   Time Frame  long-term goal (LTG), 5 - 7 days at 04/10/2021 0725

## 2021-04-12 NOTE — CONSULTS
Clinical Course: Patient is a 65 y.o. female who was admitted on 2021 with a diagnosis of Status post bilateral knee replacements [Z96.653].     Nutrition Interventions/Recommendations:   1. RD counseled adequate protein sources for healing/recovery.   2. RD provided alternate meal list and encouraged intake of healthy foods. (lean protein, fruits and vegetables)  3. Monitor PO, weight, labs, skin  Nutrition risk level 1      Past Medical History:   Diagnosis Date   • Arthritis    • Asthma    • Depression    • GERD (gastroesophageal reflux disease)    • Hypothyroidism    • Lipid disorder      Past Surgical History:   Procedure Laterality Date   •  SECTION      x3   • HYSTERECTOMY     • TONSILLECTOMY         Adult Nutrition Assessment - 21 1100        Charting Type    Nutrition Charting Type  Nutrition Brief Assessment     Nutrition Status Classification  Mild nutritional compromise     Time Spent (Minutes)  30        Reason for Assessment    Reason For Assessment  physician consult     Diagnosis  --    B-TKR       Nutrition/Diet History    Typical Food/Fluid Intake  general     Intake (%)  75%     Food Preferences  lean proteins, fruits and veggies     Food Allergies  --    shellfish    Factors Affecting Nutritional Intake  --    none       Body Mass Index (BMI)    BMI Assessment  BMI 40 or greater: obesity grade III        Labs/Procedures/Meds    Lab Results Reviewed  reviewed        Medications    Pertinent Medications Reviewed  reviewed        Physical Findings    Overall Physical Appearance  obese     Skin  surgical incision    knees       Nutrition Order    Nutrition Order  meets nutritional requirements     Nutrition Order Comments  regular and thin        PES Statement    Nutritional Needs Met?  Yes           CMP Results       04/10/21 04/06/21                       0619            --          K 3.7 --          Cl 104 --          CO2 30 --          Glucose 112 --          BUN 14  --          Creatinine 0.5 0.7          Calcium 8.4 --          Anion Gap 8 --          AST 33 --          ALT 29 --          Albumin 2.8 --          EGFR >60.0 --                       Lab Results   Component Value Date    ALT 29 04/10/2021    AST 33 04/10/2021    ALKPHOS 67 04/10/2021    BILITOT 0.9 04/10/2021     No results found for: [id number]  Lab Results   Component Value Date    WBC 8.25 04/10/2021    HGB 10.0 (L) 04/10/2021    HCT 30.0 (L) 04/10/2021    MCV 94.3 04/10/2021     04/10/2021     No results found for: CHOL  No results found for: HDL  No results found for: LDLCALC  No results found for: TRIG  No results found for: CHOLHDL  Lab Results   Component Value Date    CALCIUM 8.4 (L) 04/10/2021     Glucose Results    No lab values to display.           • acetaminophen  650 mg oral q6h GILL   • amLODIPine  10 mg oral Nightly   • aspirin  325 mg oral BID with meals   • bisacodyL  10 mg rectal Daily (7p)   • buPROPion XL  300 mg oral Daily   • cetirizine  5 mg oral Nightly   • cholecalciferol (vitamin D3)  1,000 Units oral Daily with dinner   • docusate sodium  100 mg oral BID   • levothyroxine  25 mcg oral Daily (6:30a)   • lidocaine  2 patch Topical Daily   • pantoprazole  20 mg oral Daily before breakfast   • polyethylene glycol  17 g oral Daily   • rosuvastatin  10 mg oral Daily (6p)   • senna  3 tablet oral Daily with lunch   • sertraline  200 mg oral Daily              Dietary Orders   (From admission, onward)             Start     Ordered    04/09/21 1718  Adult Diet Regular  Diet effective now     Question:  Diet Texture  Answer:  Regular    04/09/21 1720              Wt Readings from Last 3 Encounters:   04/09/21 102 kg (224 lb 4.8 oz)       Weights (last 7 days)     Date/Time   Weight    04/09/21 1718   102 kg (224 lb 4.8 oz)              Clinical Comments:  Pt seen at bedside, reports making healthy choices at meals (lean proteins, fruits and vegetables)  RD counseled adequate protein  sources for healing/recovery. RD provided alternate meal list  'and encouraged intake of healthy foods.     Date: 04/12/21  Signature: SABINE Russo

## 2021-04-12 NOTE — PROGRESS NOTES
Patient: Lili Serrato  Location: [facility] [address]  MRN: [id number]  Today's date: 4/12/2021    History of Present Illness  Lili is a 65 y.o. female admitted on 4/9/2021 with Status post bilateral knee replacements [Z96.653]. Principal problem is Status post bilateral knee replacements. Pt with hx of bilateral knee pain due to DJD.  Pain did not respond to conservative management.  Pt admitted to [facility] and underwent bilateral knee arthroplasty by Dr. Youngblood.  Patient was allowed weightbearing as tolerated bilateral lower extremity.      Past Medical History  Lili has a past medical history of Arthritis, Asthma, Depression, GERD (gastroesophageal reflux disease), Hypothyroidism, and Lipid disorder.      PT Vitals    Date/Time Pulse HR Source BP BP Location BP Method Pt Position [facility]   04/12/21 0904 81 Monitor 142/77 Left upper arm Automatic Sitting DT      PT Pain    Date/Time Pain Type Pref Pain Scale Side Orientation Location Onset/Duration Who   04/12/21 0904 Pain Assessment number (Numeric Rating Pain Scale) bilateral knee 2 diversional activity provided DT   04/12/21 0911 Pain Assessment number (Numeric Rating Pain Scale) -- knee -- -- DKS   04/12/21 0951 Pain Assessment number (Numeric Rating Pain Scale) bilateral knee 6 premedicated for activity DT          Prior Living Environment      Most Recent Value   People in Home  child(gm), adult   Living Arrangements  house   Location, Patient Bedroom  second floor, must negotiate stairs to access   Location, Bathroom  second floor, must negotiate stairs to access   Bathroom Access Comment  Pt. reports tub shower, no grab bars or DME          Prior Level of Function      Most Recent Value   Dominant Hand  right   Ambulation  independent   Transferring  independent   Toileting  independent   Bathing  independent   Dressing  independent   Assistive Device/Animal Currently Used at Home  none          IRF PT Evaluation  and Treatment - 04/12/21 0903        PT Time Calculation    Start Time  0900     Stop Time  1000     Time Calculation (min)  60 min        Session Details    Document Type  daily treatment/progress note     Mode of Treatment  individual therapy;physical therapy        General Information    Patient Profile Reviewed  yes     General Observations of Patient  received in therapy gym, in NAD, Pt with 2/10 pain        Sit to Stand Transfer    [place], Sit to Stand Transfer  touching/steadying assist     Verbal Cues  hand placement;technique     Assistive Device  walker, front-wheeled     Comment  from EOM, w/c with touching assist for steadying and improved balance        Stand to Sit Transfer    [place], Stand to Sit Transfer  touching/steadying assist     Verbal Cues  hand placement;technique     Assistive Device  walker, front-wheeled     Comment  EOM, w/c with touching assist for controlling eccentric        Stand Pivot Transfer    [place], Stand Pivot/Stand Step Transfer  touching/steadying assist     Verbal Cues  safety;technique     Assistive Device  walker, front-wheeled     Comment  amb approach with touching assist for steadying        Gait Training    [place], Gait  touching/steadying assist     Assistive Device  walker, front-wheeled     Distance in Feet  100 feet     Pattern (Gait)  step-through     Deviations/Abnormal Patterns (Gait)  bilateral deviations;gait speed decreased     Bilateral Gait Deviations  heel strike decreased     Advanced Gait Activity  10 Meter Walk Test (Self-Selected Velocity)     Comment (Gait/Stairs)  + 50ft x3 + 100ft x2 with touching assist for improving balance corrections        10 Meter Walk Test (Self-Selected Velocity)    Trial One: Ten Meter Walk Test (sec)  15.96 seconds     Trial Two: Ten Meter Walk Test (sec)  17.15 seconds     Trial Three: Ten Meter Walk Test (sec)  16.92 seconds     Trial One: Gait Speed (m/s)  0.38 m/s     Trial Two: Gait Speed (m/s)   0.35 m/s     Trial Three: Gait Speed (m/s)  0.35 m/s     Average Gait Speed (m/s): Three Trials  0.36 m/s        Stairs Training    [place], Stairs  touching/steadying assist     Assistive Device  railing     Handrail Location (Stairs)  both sides     Number of Stairs  8     Stair Height  6 inches     Ascending Stairs Technique  step-to-step    LLE leading for 4x + RLE leading for x4    Descending Stairs Technique  step-to-step    LLE leading forward    Comment  touching assist for ascending and descending with manual cuing to improve upright posture and verbal cuing to progress hands down handrails        Hip (Therapeutic Exercise)    Hip (Therapeutic Exercise)  AROM (active range of motion)     Comment (Hip Therapeutic Exercise)  AROM seated marches 2x10        Knee (Therapeutic Exercise)    Knee (Therapeutic Exercise)  AROM (active range of motion)     Comment (Knee Therapeutic Exercise)  AROM 2x10 LAQs        Ankle (Therapeutic Exercise)    Ankle (Therapeutic Exercise)  AROM (active range of motion)     Comment (Ankle Therapeutic Exercise)  AROM 2x10 APs        Daily Progress Summary (PT)    Progress Toward Functional Goals (PT)  progressing toward functional goals as expected     Daily Outcome Statement (PT)  Pt received medications per nursing present in therapy gym to begin session.  Pt completed 10MWT with result of 0.36m/s indicating increased risk for falls and patient result is below the age-gender norm for community ambulation.  Pt demos progression in ambulation distances and increased number of elevations however limited by increased pain.  Pt will continue to benefit from LE PROM and strengthening as well as increased elevations.     Recommendations (PT)  cont POC as per pt's tolerance                     IRF PT Goals      Most Recent Value   Bed Mobility Goal 1   Activity/Assistive Device  rolling to left, rolling to right, sit to supine, supine to sit at 04/10/2021 0833   [place]   modified independence at 04/10/2021 0833   Time Frame  short-term goal (STG), 1 week at 04/10/2021 0833   Bed Mobility Goal 2   Activity/Assistive Device  rolling to left, rolling to right, sit to supine, supine to sit at 04/10/2021 0833   [place]  modified independence at 04/10/2021 0833   Time Frame  long-term goal (LTG), 2 weeks [STG = LTG] at 04/10/2021 0833   Transfer Goal 1   Activity/Assistive Device  sit-to-stand/stand-to-sit, stand pivot, car transfer, walker, front-wheeled at 04/10/2021 1135   [place]  supervision required, verbal cues required at 04/10/2021 1135   Time Frame  short-term goal (STG), 1 week at 04/10/2021 1135   Transfer Goal 2   Activity/Assistive Device  sit-to-stand/stand-to-sit, car transfer, walker, front-wheeled at 04/10/2021 1135   [place]  modified independence at 04/10/2021 1135   Time Frame  long-term goal (LTG), 2 weeks at 04/10/2021 1135   Gait/Walking Locomotion Goal 1   Activity/Assistive Device  gait (walking locomotion), assistive device use at 04/10/2021 1135   Distance  200 feet at 04/10/2021 1135   [place]  supervision required, verbal cues required at 04/10/2021 1135   Time Frame  short-term goal (STG), 1 week at 04/10/2021 1135   Gait/Walking Locomotion Goal 2   Activity/Assistive Device  gait (walking locomotion), assistive device use at 04/10/2021 1135   Distance  200 feet at 04/10/2021 1135   [place]  modified independence at 04/10/2021 1135   Time Frame  short-term goal (STG), 2 weeks at 04/10/2021 1135   Stairs Goal 1   Activity/Assistive Device  ascending stairs, descending stairs, using handrail, left, cane, straight at 04/10/2021 0833   Number of Stairs  8 at 04/10/2021 0833   [place]  supervision required at 04/10/2021 0833   Time Frame  short-term goal (STG), 1 week at 04/10/2021 0833   Stairs Goal 2   Activity/Assistive Device  ascending stairs, descending stairs, using handrail, left, cane, straight at 04/10/2021 0833   Number of  Stairs  12 at 04/10/2021 0833   [place]  modified independence at 04/10/2021 0833   Time Frame  long-term goal (LTG), 2 weeks at 04/10/2021 0833

## 2021-04-12 NOTE — CONSULTS
Wound Ostomy Continence Note    Subjective    HPI Patient is a 65 y.o. female who was admitted on 2021 with a diagnosis of Status post bilateral knee replacements [Z96.653].    Problem list Problem List:   Patient Active Problem List   Diagnosis   • Osteoarthritis of knees, bilateral   • Status post total bilateral knee replacement   • HTN (hypertension)   • Hypercholesterolemia   • Hypothyroidism   • Depression   • Asthma   • Seasonal allergies   • GERD (gastroesophageal reflux disease)   • Obesity   • Status post bilateral knee replacements   • Leg swelling      PMH/PSH Medical History:   Past Medical History:   Diagnosis Date   • Arthritis    • Asthma    • Depression    • GERD (gastroesophageal reflux disease)    • Hypothyroidism    • Lipid disorder      Surgical History:   Past Surgical History:   Procedure Laterality Date   •  SECTION      x3   • HYSTERECTOMY     • TONSILLECTOMY        Assessment and Recommendation       21 1000   Surgical Incision Knee Left   Date First Assessed/Time First Assessed: 21 183   Pre-Existing Wound: Yes  Location: Knee  Wound Location Orientation: Left   Incision WDL ex   Dressing Appearance no drainage;dry;intact   Appearance unable to visualize;well approximated   Drainage Characteristics/Odor no odor   Dressing silver impregnated dressing   Recommendation   (monitor dressing daily, remove when indicated)   Surgical Incision Knee Right   Date First Assessed/Time First Assessed: 21 1838   Pre-Existing Wound: Yes  Location: Knee  Wound Location Orientation: Right   Incision WDL ex   Dressing Appearance no drainage;dry;intact   Appearance well approximated   Drainage Characteristics/Odor no odor   Recommendation   (monitor dressing daily, remove when indicated)              Seen for skin assessment PI prevention education given. Right lateral heel, ecchymosis, measures 3cm x 6cm. Silver impregnated dressings bilateral knee incisions, Pulled back for  skin assessment, Incision, WNL, approximated ,reapplied. Edema, 2+ BLE, patient admits to wearing thigh high teds in acute care. RN notified. Suggest: offload heels from bed, turn q 2 hours in bed, weight shift in wheelchair, q 30 minutes, monitor dressings daily, remove when indicated. WOCN consult is completed. Please order a new consult if an additional skin assessment in indicated.     Date: 04/12/21  Signature: Stacy Watson RN

## 2021-04-12 NOTE — PROGRESS NOTES
History of present illness:      Patient is a 65-year-old female with history of bilateral knee pain due to DJD.  Pain not responding to conservative management.  Patient was admitted to [facility] and underwent bilateral knee arthroplasty by Dr. Youngblood.  Postoperatively he was started on aspirin 325 g twice daily for DVT prevention.  Per records patient does not tolerate Percocet currently on tramadol for pain control.  Patient was allowed weightbearing castrated bilateral lower extremity.     Patient was evaluated by physical medicine rehabilitation deemed to be a good candidate for acute rehabilitation.  Patient now transferred to [facility] also for comprehensive acute inpatient rehabitation admitted on April 9, 2021.     Scheduled Meds:  • acetaminophen  650 mg oral q6h GILL   • amLODIPine  10 mg oral Nightly   • aspirin  325 mg oral BID with meals   • bisacodyL  10 mg rectal Daily (7p)   • buPROPion XL  300 mg oral Daily   • cetirizine  5 mg oral Nightly   • cholecalciferol (vitamin D3)  1,000 Units oral Daily with dinner   • docusate sodium  100 mg oral BID   • levothyroxine  25 mcg oral Daily (6:30a)   • lidocaine  2 patch Topical Daily   • pantoprazole  20 mg oral Daily before breakfast   • polyethylene glycol  17 g oral Daily   • rosuvastatin  10 mg oral Daily (6p)   • senna  3 tablet oral Daily with lunch   • sertraline  200 mg oral Daily     Continuous Infusions:  PRN Meds:.•  acetaminophen  •  albuterol HFA  •  alum-mag hydroxide-simeth  •  carboxymethylcellulose  •  cyclobenzaprine  •  diphenhydrAMINE  •  magnesium hydroxide  •  traMADoL  •  trimethobenzamide     Lab Results   Component Value Date    WBC 8.25 04/10/2021    HGB 10.0 (L) 04/10/2021    HCT 30.0 (L) 04/10/2021    MCV 94.3 04/10/2021     04/10/2021       Lab Results   Component Value Date    GLUCOSE 112 (H) 04/10/2021    CALCIUM 8.4 (L) 04/10/2021     04/10/2021    K 3.7 04/10/2021    CO2 30 04/10/2021     " 04/10/2021    BUN 14 04/10/2021    CREATININE 0.5 (L) 04/10/2021     Subjective: Patient seen and examined no chest pain or shortness of breath, denied dizziness or lightheadedness or fever.  Patient is making steady progress in rehab therapies.  We will remove bilateral knee dressing tomorrow.Patient had uneventful weekend    Physical examination:     His examination today showed a 65-year-old female no acute distress.  Patient awake alert oriented x3.     Blood pressure 130/73, pulse 83, temperature 37.3 °C (99.1 °F), temperature source Oral, resp. rate 18, height 1.588 m (5' 2.5\"), weight 102 kg (224 lb 4.8 oz), SpO2 94 %.     Head normocephalic, sclera nonicteric, mucous brains moist.     Neck supple     Heart regular S1-S2     Lungs respiration nonlabored     GI examination was benign.     Musculoskeletal exam: Passive range of motion within functional limit bilateral upper extremity.  Examination of bilateral lower extremity range of motion bilateral knee about -10-80 degrees.  Patient with good quad function bilaterally.  There was no evidence of calf tenderness or foot drop.     Neuro exam: Mental status as above patient able to obey simple commands.  Cranial nerve examination shows face symmetric, tongue midline, ocular motility intact and visual fields are full.  Motor examination bilateral upper extremity 5/5.  Examination of bilateral lower extremity exam was suboptimal due to recent bilateral knee surgery there was hip and ankle 5/5.  Sensory examination grossly normal.  Deep tendon reflexes were symmetrical bilateral upper extremity.  Gait not tested at this time.     Impression:     Ambulatory ADL dysfunction due to:     DJD status post bilateral knee arthroplasty, patient is weightbearing as tolerated bilateral lower extremity.  Patient on aspirin for DVT prevention.  Patient on tramadol for pain control.     Postoperative anemia     History of hypertension on Norvasc     Hypothyroidism on " Synthroid     History of depression on Wellbutrin and Zoloft     Hyperlipidemia on Crestor     GERD on Protonix     History of asthma on albuterol HFA as needed    Morbid obesity, followed by dietition        Plan:     Patient will continue physical therapy and Occupational Therapy.  We will consult Dr. Orozco for medical management, patient also be followed by Dr. Bonilla psychiatry.  Patient be followed by psychology and case management for support.  Patient will receive 24-hour application nursing for bowel and bladder management, pain monitoring and education.  Rehabitation precautions include cardiac and for precautions.  Will monitor routine labs in addition we will monitor healing of bilateral knee incision healing closely.     Goals: Improve overall function for transfers, ambulation, ADLs and elevation.     Extent of length of stay about 10 days     Discussed case with Dr. Orozco medical consultant, patient and nursing     This patient note has been dictated using speech recognition software.  Inadvertent speech recognition errors should be disregarded. Please do not hesitate to call my office for clarification.     Discussed with patient, Dr. Orozco medical consultant and nursing.     This patient note has been dictated using speech recognition software.  Inadvertent speech recognition errors should be disregarded. Please do not hesitate to call my office for clarification.

## 2021-04-12 NOTE — PLAN OF CARE
Problem: Rehabilitation (IRF) Plan of Care  Goal: Plan of Care Review  Outcome: Progressing  Flowsheets (Taken 4/12/2021 1105)  Plan of Care Reviewed With: patient  RETS20 IRF Plan of Care Review: progress ongoing, continue  Outcome Summary: Pain meds reviewed with pt   Plan of Care Review  RETS20 IRF Plan of Care Review: progress ongoing, continue  Plan of Care Reviewed With: patient  Outcome Summary: Pain meds reviewed with pt

## 2021-04-12 NOTE — CONSULTS
Psychiatry Consult      Lili Serrato is a 65 y.o. female  Patient was admitted for comprehensive rehabilitation including PT/OT/Speech tx/Recr tx/nutition management. Referred by Rehabilitation Medicine Service for evaluation and treatment of psychiatric condition.     History of Present Medical Illness  Patient is a 65-year-old female with history of bilateral knee pain due to DJD.  Pain not responding to conservative management.  Patient was admitted to [facility] and underwent bilateral knee arthroplasty by Dr. Youngblood.  Postoperatively he was started on aspirin 325 g twice daily for DVT prevention.  Per records patient does not tolerate Percocet currently on tramadol for pain control.  Patient was allowed weightbearing castrated bilateral lower extremity.  Outside records reviewed. Pertinent radiology results reviewed. Pertinent lab results reviewed..  Patient seen, chart reviewed, records reviewed.  Has a history of depression and anxiety and takes Wellbutrin and Zoloft.  She has no GI side effect akathisia headache or sedation related to Wellbutrin.  She sleeps fairly well.  She is interactive and appropriate.  She does not have any history of any substance use issues.  No signs or symptoms of hypomania or kalpesh or psychosis.  She was interactive and appropriate and eager to do well in her therapies.  Psychiatric History:  Depressions   No significant anxiety         No psychosis / kalpesh ocd or otherwise  Substance Use History:  Substance use:   No history of substance abuse or dependence  Alcohol use social    Family History: History reviewed. No pertinent family history.    Social History:   Social History     Socioeconomic History   • Marital status:      Spouse name: None   • Number of children: None   • Years of education: None   • Highest education level: None   Occupational History   • None   Tobacco Use   • Smoking status: None   Substance and Sexual Activity   • Alcohol use:  None   • Drug use: None   • Sexual activity: None   Other Topics Concern   • None   Social History Narrative   • None     Social Determinants of Health     Financial Resource Strain:    • Difficulty of Paying Living Expenses:    Food Insecurity:    • Worried About Running Out of Food in the Last Year:    • Ran Out of Food in the Last Year:    Transportation Needs:    • Lack of Transportation (Medical):    • Lack of Transportation (Non-Medical):    Physical Activity:    • Days of Exercise per Week:    • Minutes of Exercise per Session:    Stress:    • Feeling of Stress :    Social Connections:    • Frequency of Communication with Friends and Family:    • Frequency of Social Gatherings with Friends and Family:    • Attends Rastafarian Services:    • Active Member of Clubs or Organizations:    • Attends Club or Organization Meetings:    • Marital Status:    Intimate Partner Violence:    • Fear of Current or Ex-Partner:    • Emotionally Abused:    • Physically Abused:    • Sexually Abused:        Medical History:   Past Medical History:   Diagnosis Date   • Arthritis    • Asthma    • Depression    • GERD (gastroesophageal reflux disease)    • Hypothyroidism    • Lipid disorder        Surgical History:   Past Surgical History:   Procedure Laterality Date   •  SECTION      x3   • HYSTERECTOMY     • TONSILLECTOMY         Allergies:   Allergies   Allergen Reactions   • Penicillins    • Percocet [Oxycodone-Acetaminophen]    • Shellfish Derived        MENTAL STATUS EXAM  Appearance: well groomed  Gait and Motor: no abnormal movements  Speech: normal rate/rhythm/volume  Mood: depressed and anxious  Affect: constricted  Associations: coherent  Thought Process: goal-directed  Thought Content: no auditory or visual hallucinations.  Suicidality/Homicidality: denies  Judgement/Insight: minimizes severity level of illness  Orientation: situation  Memory: knows current president  Attention: alert  Knowledge: normal  Language:  normal      Current Medications:  •  acetaminophen, 650 mg, oral, q4h PRN  •  acetaminophen, 650 mg, oral, q6h GILL  •  albuterol HFA, 2 puff, inhalation, q6h PRN  •  alum-mag hydroxide-simeth, 30 mL, oral, q4h PRN  •  amLODIPine, 10 mg, oral, Nightly  •  aspirin, 325 mg, oral, BID with meals  •  bisacodyL, 10 mg, rectal, Daily (7p)  •  buPROPion XL, 300 mg, oral, Daily  •  carboxymethylcellulose, 1 drop, Both Eyes, 3x daily PRN  •  cetirizine, 5 mg, oral, Nightly  •  cholecalciferol (vitamin D3), 1,000 Units, oral, Daily with dinner  •  cyclobenzaprine, 10 mg, oral, 3x daily PRN  •  diphenhydrAMINE, 25 mg, oral, q6h PRN  •  docusate sodium, 100 mg, oral, BID  •  levothyroxine, 25 mcg, oral, Daily (6:30a)  •  lidocaine, 2 patch, Topical, Daily  •  magnesium hydroxide, 30 mL, oral, 2x daily PRN  •  pantoprazole, 20 mg, oral, Daily before breakfast  •  polyethylene glycol, 17 g, oral, Daily  •  rosuvastatin, 10 mg, oral, Daily (6p)  •  senna, 3 tablet, oral, Daily with lunch  •  sertraline, 200 mg, oral, Daily  •  traMADoL, 50 mg, oral, q4h PRN  •  trimethobenzamide, 300 mg, oral, 3x daily PRN    Home Medications:  •  amLODIPine, Take 10 mg by mouth daily.  •  aspirin, Take 81 mg by mouth daily.  •  buPROPion XL, Take 300 mg by mouth daily.  •  carboxymethylcellulose, Administer 2 drops into both eyes daily as needed for dry eyes.  •  cholecalciferol (vitamin D3), Take 2,500 Units by mouth daily.  •  levothyroxine, Take 25 mcg by mouth daily.  •  loratadine, Take 10 mg by mouth daily.  •  omeprazole, Take 20 mg by mouth daily before breakfast.  •  rosuvastatin, Take 10 mg by mouth daily.  •  sertraline, Take 200 mg by mouth daily.    Objective     Vital Signs for the last 24 hours:  Temp:  [36.6 °C (97.9 °F)-36.9 °C (98.4 °F)] 36.6 °C (97.9 °F)  Heart Rate:  [77-90] 78  Resp:  [18] 18  BP: (127-160)/(66-72) 143/71    Labs  Lab Results   Component Value Date    WBC 8.25 04/10/2021    HGB 10.0 (L) 04/10/2021    HCT 30.0  (L) 04/10/2021    MCV 94.3 04/10/2021     04/10/2021     Lab Results   Component Value Date    GLUCOSE 112 (H) 04/10/2021    CALCIUM 8.4 (L) 04/10/2021     04/10/2021    K 3.7 04/10/2021    CO2 30 04/10/2021     04/10/2021    BUN 14 04/10/2021    CREATININE 0.5 (L) 04/10/2021       Imaging  [unfilled]      65 y.o. female being consulted for management recommendations and patient co-management       Plan     Patient Active Problem List   Diagnosis   • Osteoarthritis of knees, bilateral   • Status post total bilateral knee replacement   • HTN (hypertension)   • Hypercholesterolemia   • Hypothyroidism   • Depression   • Asthma   • Seasonal allergies   • GERD (gastroesophageal reflux disease)   • Obesity   • Status post bilateral knee replacements   • Leg swelling         Assessment/Plan    Depression: CBT automatic anxious and negative thoughts  Adjustment Disorder to Disability: supportive therapy  Sleep:monitor  Insight into illness: insight therapy/psychoeducation  Psychotherapy: supportive  Monitor: Mood, Speech, Appetite, Energy, Cognition, Behavioral, Impulsivity, Agitation  Family Support  Medications:  sertraline, 200 mg, oral, Daily  Last sodium 142  wellbutrin xl 300  Sodium 142 - monitor on psych meds  Monitor medications for side effects  Educate about dosing    Christopher Bonilla MD  4/12/2021  8:35 AM

## 2021-04-12 NOTE — PLAN OF CARE
Plan of Care Review  RETS20 IRF Plan of Care Review: progress ongoing, continue  Plan of Care Reviewed With: patient  Outcome Summary: Resident asleep ,easy to arouse. saturation 95% with 2L O2 on. Pain cotrolled with PRN pain meds. use rolling walker to use toilet. Resident refused bowel meds as c/o of loose stool.

## 2021-04-13 ENCOUNTER — APPOINTMENT (INPATIENT)
Dept: PHYSICAL THERAPY | Facility: REHABILITATION | Age: 66
DRG: 560 | End: 2021-04-13
Payer: COMMERCIAL

## 2021-04-13 ENCOUNTER — APPOINTMENT (INPATIENT)
Dept: OCCUPATIONAL THERAPY | Facility: REHABILITATION | Age: 66
DRG: 560 | End: 2021-04-13
Payer: COMMERCIAL

## 2021-04-13 PROCEDURE — 97116 GAIT TRAINING THERAPY: CPT | Mod: GP,CQ

## 2021-04-13 PROCEDURE — 97110 THERAPEUTIC EXERCISES: CPT | Mod: GO

## 2021-04-13 PROCEDURE — 12800000 HC ROOM AND CARE SEMIPRIVATE REHAB

## 2021-04-13 PROCEDURE — 97110 THERAPEUTIC EXERCISES: CPT | Mod: GP,CQ

## 2021-04-13 PROCEDURE — 63700000 HC SELF-ADMINISTRABLE DRUG: Performed by: INTERNAL MEDICINE

## 2021-04-13 PROCEDURE — 97530 THERAPEUTIC ACTIVITIES: CPT | Mod: GO

## 2021-04-13 PROCEDURE — 97530 THERAPEUTIC ACTIVITIES: CPT | Mod: GP

## 2021-04-13 RX ADMIN — DOCUSATE SODIUM 100 MG: 100 CAPSULE, LIQUID FILLED ORAL at 20:52

## 2021-04-13 RX ADMIN — ACETAMINOPHEN 650 MG: 325 TABLET, FILM COATED ORAL at 23:24

## 2021-04-13 RX ADMIN — TRAMADOL HYDROCHLORIDE 50 MG: 50 TABLET, COATED ORAL at 16:41

## 2021-04-13 RX ADMIN — ACETAMINOPHEN 650 MG: 325 TABLET, FILM COATED ORAL at 11:55

## 2021-04-13 RX ADMIN — ASPIRIN 325 MG: 325 TABLET, COATED ORAL at 16:41

## 2021-04-13 RX ADMIN — ACETAMINOPHEN 650 MG: 325 TABLET, FILM COATED ORAL at 06:09

## 2021-04-13 RX ADMIN — ROSUVASTATIN CALCIUM 10 MG: 5 TABLET, FILM COATED ORAL at 17:53

## 2021-04-13 RX ADMIN — TRAMADOL HYDROCHLORIDE 50 MG: 50 TABLET, COATED ORAL at 07:45

## 2021-04-13 RX ADMIN — LEVOTHYROXINE SODIUM 25 MCG: 25 TABLET ORAL at 06:09

## 2021-04-13 RX ADMIN — TRAMADOL HYDROCHLORIDE 50 MG: 50 TABLET, COATED ORAL at 11:55

## 2021-04-13 RX ADMIN — Medication 1000 UNITS: at 16:41

## 2021-04-13 RX ADMIN — CETIRIZINE HYDROCHLORIDE 5 MG: 5 TABLET ORAL at 20:50

## 2021-04-13 RX ADMIN — AMLODIPINE BESYLATE 10 MG: 5 TABLET ORAL at 20:50

## 2021-04-13 RX ADMIN — SERTRALINE 200 MG: 50 TABLET, FILM COATED ORAL at 07:45

## 2021-04-13 RX ADMIN — ACETAMINOPHEN 650 MG: 325 TABLET, FILM COATED ORAL at 17:53

## 2021-04-13 RX ADMIN — BUPROPION HYDROCHLORIDE 300 MG: 150 TABLET, FILM COATED, EXTENDED RELEASE ORAL at 07:46

## 2021-04-13 RX ADMIN — PANTOPRAZOLE SODIUM 20 MG: 20 TABLET, DELAYED RELEASE ORAL at 07:45

## 2021-04-13 RX ADMIN — TRAMADOL HYDROCHLORIDE 50 MG: 50 TABLET, COATED ORAL at 20:50

## 2021-04-13 RX ADMIN — ASPIRIN 325 MG: 325 TABLET, COATED ORAL at 07:45

## 2021-04-13 NOTE — PROGRESS NOTES
[facility] Progress Note          Patient was seen and examined at bedside.    Subjective:   4/12/21:  Stable, her pain is manageable, denies paresthesia, tolerated her PT/OT, her BM and voiding are fine.     4/13/21:  B/l LE venous compression U/S 4/12/21:  No evidence for DVT identified in the bilateral lower extremities. There is a Baker's cyst in the right leg measuring 2 x 4 cm. There is a Baker's cyst in the left lower extremity measuring 2 x 2.7 cm.   No new complaints, her pain is manageable, her PT/OT is progressing well.     Objective   Vital signs in last 24 hours:  Temp:  [36.8 °C (98.3 °F)-36.9 °C (98.4 °F)] 36.8 °C (98.3 °F)  Heart Rate:  [77-85] 85  Resp:  [18] 18  BP: (127-133)/(64-77) 127/77    No intake or output data in the 24 hours ending 04/13/21 1135  Intake/Output this shift:  No intake/output data recorded.   Review of Systems:  All other systems reviewed and negative except as noted in the HPI.   Objective      Labs  reviewed his labs thoroughly   Lab Results   Component Value Date    WBC 8.25 04/10/2021    HGB 10.0 (L) 04/10/2021    HCT 30.0 (L) 04/10/2021    MCV 94.3 04/10/2021     04/10/2021     Lab Results   Component Value Date    GLUCOSE 112 (H) 04/10/2021    CALCIUM 8.4 (L) 04/10/2021     04/10/2021    K 3.7 04/10/2021    CO2 30 04/10/2021     04/10/2021    BUN 14 04/10/2021    CREATININE 0.5 (L) 04/10/2021       Imaging  OSH imaging study reports reviewed       Full Code    Physical Exam:  Head/Ear/Nose/Throat: normocephalic; atraumatic; moisture mouth mm, no oropharyngeal thrush noted.   Eyes: anicteric sclera, EOMI; PERRL.   Neck : supple, no JVD, no carotid bruits appeciated.   Respiratory: no evidence of labored breathing, lung sounds CTA b/l, good aeration bibasilar area, no w/r/c.   Cardiovascular: RRR; normal S1, S2; no m/r/g; no S3 or S4.   Gastrointestinal: soft; NT; BS normal; mildly distended; no CVAT b/l.   Genitourinary: no  graham.   Extremities : s/p b/l TKA, incisional site with dressing, focal c/d/i .   Neurological: AO x 3, fluent speeches, following commands, CNS II-XII grossly intact; no focal neurologic deficits.   Behavior/Emotional: in NAD, appropriate; cooperative.   Skin: clean, dry and intact.     Plan of care was discussed with patient, RN, and PMR attending     Assessment   CC: B/l knee severe DJD, failed conservative treatments, s/p b/l TKA, ambulatory dysfunction.     65 y.o.female, with PMH of obesity, HTN, HLD, hypothyroidism, anxiety/depression, GERD, seasonal allergy, h/o asthma, who has advanced b/l knee DJD, failed multiple outpatient conservative treatments, associated with progressively worsening b/l knee pain and difficulty with her ambulation, admitted to [facility] on 4/6/21 and underwent an elective b/l TKA . w/o procedure related complications, transferred to [facility] on 4/9/21 for post op inpatient acute rehab to address her ambulatory dysfunction. Her post op course was not complicated, post op acute pain , on prn tramadol, she seemed not tolerating oxycodone well, post op anemia but no need PRBCm H/H stable, on  mg bid for weeks for post op b/l LE DVT prophylaxis.     1. advanced b/l knee DJD, failed conservative treatments, progressively worsening b/l knee pain with ambulatory dysfunction,  s/p elective B/L TKA : inpt rehab, pain management, DVT prophylaxis, surgical site wound care/dermal defense, f/u with orthopedic as scheduled.     2. post op acute pain: pain management with titration based on regular pain scale evaluation, provide topical Lidoderm patch ,  ice packs, prn muscle relaxant.     3. post op blood loss anemia: f/u H/H, no need PRBC based on current H/H level, po iron with Vit C.     4. GI-constipation, GERD: provide constipation bowel regimen, po hydration, fiber intake, timed toilet visits.  PPI for GERD.     5. DVT prophy: SCD, TEDs, early ambulation, po ecotrin per ortho, check LE venous  DUS to r/o DVT.       6. HTN, Dyslipidemia: on amlodipine and statin. Orthostatic hypotension prevention.     7. -urinary retention: timed voiding, monitor PVR with cic, check UA and UCX.     8. Hypothyroidism: cont home dose of levothyroxine.     9. Anxiety, depression: cont wellbutrin and zoloft, consult psychiatrist and psychologist CBT.     Billing code: [id number]  Diagnoses:  Patient Active Problem List   Diagnosis   • Osteoarthritis of knees, bilateral   • Status post total bilateral knee replacement   • HTN (hypertension)   • Hypercholesterolemia   • Hypothyroidism   • Depression   • Asthma   • Seasonal allergies   • GERD (gastroesophageal reflux disease)   • Obesity   • Status post bilateral knee replacements   • Leg swelling      Aj Orozco MD  4/13/2021

## 2021-04-13 NOTE — PROGRESS NOTES
Psychiatry Progress Note    History of Present Illness   See initial consult.  History pertaining to psychosis, depression and anxiety and other psychiatric and psychologic conditions as well as general medical history detailed in initial consult.      Interval History:     Feeling better.  Affect brighter.  Sodium okay at 142 and vital signs stable.  Hemoglobin low at 10.0  Patient did okay overnight.  No nursing report of any impulsivity, agitation or behavioral disturbance.  Sleep pattern normalizing.  Adjusting to routine of the hospital.   Seems motivated to participate in therapies. No suicidal ideation, auditory or visual hallucinations or paranoid ideation. Reviewed progress and behavior with nursing    MENTAL STATUS EXAM  Appearance: well groomed  Gait and Motor: no abnormal movements  Speech: normal rate/rhythm/volume  Mood: depressed and anxious  Affect: constricted  Associations: coherent  Thought Process: goal-directed  Thought Content: no auditory or visual hallucinations.  Suicidality/Homicidality: denies  Judgement/Insight: minimizes severity level of illness  Orientation: situation  Memory: knows current president  Attention: alert  Knowledge: normal  Language: normal      Vital Signs for the last 24 hours:  Temp:  [36.8 °C (98.3 °F)-36.9 °C (98.4 °F)] 36.8 °C (98.3 °F)  Heart Rate:  [77-84] 77  Resp:  [18] 18  BP: (127-142)/(64-77) 127/71    Labs:  Labs below reviewed for pertinence to psychiatric condition  Lab Results   Component Value Date    GLUCOSE 112 (H) 04/10/2021    CALCIUM 8.4 (L) 04/10/2021     04/10/2021    K 3.7 04/10/2021    CO2 30 04/10/2021     04/10/2021    BUN 14 04/10/2021    CREATININE 0.5 (L) 04/10/2021     Lab Results   Component Value Date    WBC 8.25 04/10/2021    HGB 10.0 (L) 04/10/2021    HCT 30.0 (L) 04/10/2021    MCV 94.3 04/10/2021     04/10/2021     Pain Management Panel    There is no flowsheet data to display.           Current  Facility-Administered Medications   Medication Dose Route Frequency Provider Last Rate Last Admin   • acetaminophen (TYLENOL) tablet 650 mg  650 mg oral q4h PRN Aj Orozco MD       • acetaminophen (TYLENOL) tablet 650 mg  650 mg oral q6h GILL Aj Orozco MD   650 mg at 04/13/21 0609   • albuterol HFA (VENTOLIN HFA) 90 mcg/actuation inhaler 2 puff  2 puff inhalation q6h PRN Aj Orozco MD       • alum-mag hydroxide-simeth (MAALOX) 200-200-20 mg/5 mL suspension 30 mL  30 mL oral q4h PRN Aj Orozco MD       • amLODIPine (NORVASC) tablet 10 mg  10 mg oral Nightly Aj Orozco MD   10 mg at 04/12/21 2148   • aspirin EC tablet 325 mg  325 mg oral BID with meals Aj Orozco MD   325 mg at 04/13/21 0745   • bisacodyL (DULCOLAX) 10 mg suppository 10 mg  10 mg rectal Daily (7p) Aj Orozco MD       • buPROPion XL (WELLBUTRIN XL) 24 hr ER tablet 300 mg  300 mg oral Daily Aj Orozco MD   300 mg at 04/13/21 0746   • carboxymethylcellulose (REFRESH PLUS) 0.5 % ophthalmic dropperette 1 drop  1 drop Both Eyes 3x daily PRN Aj Orozco MD       • cetirizine (ZyrTEC) tablet 5 mg  5 mg oral Nightly Aj Orozco MD   5 mg at 04/12/21 2149   • cholecalciferol (vitamin D3) tablet 1,000 Units  1,000 Units oral Daily with dinner Aj Orozco MD   1,000 Units at 04/12/21 1656   • cyclobenzaprine (FLEXERIL) tablet 10 mg  10 mg oral 3x daily PRN Aj Orozco MD       • diphenhydrAMINE (BENADRYL) capsule 25 mg  25 mg oral q6h PRN Aj Orozco MD       • docusate sodium (COLACE) capsule 100 mg  100 mg oral BID Aj Orozco MD   100 mg at 04/12/21 2148   • levothyroxine (SYNTHROID) tablet 25 mcg  25 mcg oral Daily (6:30a) Aj Orozco MD   25 mcg at 04/13/21 0609   • lidocaine (ASPERCREME) 4 % topical patch 2 patch  2 patch Topical Daily Aj Orozco MD   2 patch at 04/11/21 0801   • magnesium hydroxide (M.O.M.) 400 mg/5 mL suspension 30 mL  30 mL oral 2x daily PRN Aj Orozco MD       • pantoprazole  (PROTONIX) tablet,delayed release (DR/EC) 20 mg  20 mg oral Daily before breakfast Aj Orozco MD   20 mg at 04/13/21 0745   • polyethylene glycol (MIRALAX) 17 gram packet 17 g  17 g oral Daily Aj Orozco MD   17 g at 04/09/21 1819   • rosuvastatin (CRESTOR) tablet 10 mg  10 mg oral Daily (6p) Aj Orozco MD   10 mg at 04/12/21 1802   • senna (SENOKOT) tablet 3 tablet  3 tablet oral Daily with lunch Aj Orozco MD       • sertraline (ZOLOFT) tablet 200 mg  200 mg oral Daily Aj Orozco MD   200 mg at 04/13/21 0745   • traMADoL (ULTRAM) tablet 50 mg  50 mg oral q4h PRN Aj Orozco MD   50 mg at 04/13/21 0745   • trimethobenzamide (TIGAN) capsule 300 mg  300 mg oral 3x daily PRN Aj Orozco MD            Patient Active Problem List   Diagnosis   • Osteoarthritis of knees, bilateral   • Status post total bilateral knee replacement   • HTN (hypertension)   • Hypercholesterolemia   • Hypothyroidism   • Depression   • Asthma   • Seasonal allergies   • GERD (gastroesophageal reflux disease)   • Obesity   • Status post bilateral knee replacements   • Leg swelling           Assessment/Plan    Depression: CBT automatic anxious and negative thoughts  Adjustment Disorder to Disability: supportive therapy  Sleep:  Insight into illness: insight therapy/psychoeducation  Psychotherapy: supportive  Monitor: Mood, Speech, Appetite, Energy, Cognition, Behavioral, Impulsivity, Agitation  Family Support  Medications: monitor for side effects  - presently no gi, akathesia , ha or sedation  sertraline, 200 mg, oral, Daily  Last sodium 142  wellbutrin xl 300  Sodium 142 - monitor on psych meds  Monitor medications for side effects  Educate about dosing   okay to continue current medication with improvement in mood and tolerated well  Christopher Bonilla MD  4/13/2021

## 2021-04-13 NOTE — PLAN OF CARE
Plan of Care Review  RETS20 IRF Plan of Care Review: progress ongoing, continue  Plan of Care Reviewed With: patient  Outcome Summary: AAOx4; Continent of B&B; Pain managed ATC/PRN with medication; Room air; No report of concerns regarding nursing care at this time

## 2021-04-13 NOTE — PROGRESS NOTES
History of present illness:      Patient is a 65-year-old female with history of bilateral knee pain due to DJD.  Pain not responding to conservative management.  Patient was admitted to [facility] and underwent bilateral knee arthroplasty by Dr. Youngblood.  Postoperatively he was started on aspirin 325 g twice daily for DVT prevention.  Per records patient does not tolerate Percocet currently on tramadol for pain control.  Patient was allowed weightbearing castrated bilateral lower extremity.     Patient was evaluated by physical medicine rehabilitation deemed to be a good candidate for acute rehabilitation.  Patient now transferred to [facility] also for comprehensive acute inpatient rehabitation admitted on April 9, 2021.     Scheduled Meds:  • acetaminophen  650 mg oral q6h GILL   • amLODIPine  10 mg oral Nightly   • aspirin  325 mg oral BID with meals   • bisacodyL  10 mg rectal Daily (7p)   • buPROPion XL  300 mg oral Daily   • cetirizine  5 mg oral Nightly   • cholecalciferol (vitamin D3)  1,000 Units oral Daily with dinner   • docusate sodium  100 mg oral BID   • levothyroxine  25 mcg oral Daily (6:30a)   • lidocaine  2 patch Topical Daily   • pantoprazole  20 mg oral Daily before breakfast   • polyethylene glycol  17 g oral Daily   • rosuvastatin  10 mg oral Daily (6p)   • senna  3 tablet oral Daily with lunch   • sertraline  200 mg oral Daily     Continuous Infusions:  PRN Meds:.•  acetaminophen  •  albuterol HFA  •  alum-mag hydroxide-simeth  •  carboxymethylcellulose  •  cyclobenzaprine  •  diphenhydrAMINE  •  magnesium hydroxide  •  traMADoL  •  trimethobenzamide     Lab Results   Component Value Date    WBC 8.25 04/10/2021    HGB 10.0 (L) 04/10/2021    HCT 30.0 (L) 04/10/2021    MCV 94.3 04/10/2021     04/10/2021       Lab Results   Component Value Date    GLUCOSE 112 (H) 04/10/2021    CALCIUM 8.4 (L) 04/10/2021     04/10/2021    K 3.7 04/10/2021    CO2 30 04/10/2021     " 04/10/2021    BUN 14 04/10/2021    CREATININE 0.5 (L) 04/10/2021     Subjective: Patient seen and examined no chest pain or shortness of breath, patient is making steady progress in rehab therapies was seen ambulating with therapist with close supervision, with good range of motion bilateral knees.  Doppler ultrasound bilateral extremity was negative on 412.      Physical examination:     His examination today showed a 65-year-old female no acute distress.  Patient awake alert oriented x3.     Blood pressure (!) 147/75, pulse 85, temperature 36.8 °C (98.3 °F), temperature source Oral, resp. rate 18, height 1.588 m (5' 2.5\"), weight 102 kg (224 lb 4.8 oz), SpO2 94 %, not currently breastfeeding.       Head normocephalic, sclera nonicteric, mucous brains moist.     Neck supple     Heart regular S1-S2     Lungs respiration nonlabored     GI examination was benign.     Musculoskeletal exam: Passive range of motion within functional limit bilateral upper extremity.    Patient with good bilateral knee range of motion, patient with good quad function bilaterally.  There was no evidence of calf tenderness or foot drop.     Neuro exam: Mental status as above patient able to obey simple commands.  Cranial nerve examination shows face symmetric, tongue midline, ocular motility intact and visual fields are full.  Motor examination bilateral upper extremity 5/5.  Examination of bilateral lower extremity exam was suboptimal due to recent bilateral knee surgery there was hip and ankle 5/5.  Sensory examination grossly normal.  Deep tendon reflexes were symmetrical bilateral upper extremity.  Gait not tested at this time.      Bilateral knee incisions dry clean and intact covered with zip shape dressing, no drainage or erythema both incisions look excellent covered by Telfa/tape    Impression:     Ambulatory ADL dysfunction due to:     DJD status post bilateral knee arthroplasty, patient is weightbearing as tolerated " bilateral lower extremity.  Patient on aspirin for DVT prevention.  Patient on tramadol for pain control.     Postoperative anemia     History of hypertension on Norvasc     Hypothyroidism on Synthroid     History of depression on Wellbutrin and Zoloft     Hyperlipidemia on Crestor     GERD on Protonix     History of asthma on albuterol HFA as needed    Morbid obesity, followed by dietition        Plan:     Patient will continue physical therapy and Occupational Therapy.  We will consult Dr. Orozco for medical management, patient also be followed by Dr. Bonilla psychiatry.  Patient be followed by psychology and case management for support.  Patient will receive 24-hour application nursing for bowel and bladder management, pain monitoring and education.  Rehabitation precautions include cardiac and for precautions.  Will monitor routine labs in addition we will monitor healing of bilateral knee incision healing closely.     Goals: Improve overall function for transfers, ambulation, ADLs and elevation.     Extent of length of stay about 10 days     Discussed case with Dr. Orozco medical consultant, patient and nursing     This patient note has been dictated using speech recognition software.  Inadvertent speech recognition errors should be disregarded. Please do not hesitate to call my office for clarification.     Discussed with patient, Dr. Orozco medical consultant and nursing.     This patient note has been dictated using speech recognition software.  Inadvertent speech recognition errors should be disregarded. Please do not hesitate to call my office for clarification.

## 2021-04-13 NOTE — PROGRESS NOTES
Patient: Lili Serrato  Location: [facility] [address]  MRN: [id number]  Today's date: 4/13/2021    History of Present Illness  Lili is a 65 y.o. female admitted on 4/9/2021 with Status post bilateral knee replacements [Z96.653]. Principal problem is Status post bilateral knee replacements. Pt with hx of bilateral knee pain due to DJD.  Pain did not respond to conservative management.  Pt admitted to [facility] and underwent bilateral knee arthroplasty by Dr. Youngblood.  Patient was allowed weightbearing as tolerated bilateral lower extremity.      Past Medical History  Lili has a past medical history of Arthritis, Asthma, Depression, GERD (gastroesophageal reflux disease), Hypothyroidism, and Lipid disorder.      PT Vitals    Date/Time Pulse HR Source BP BP Location BP Method Pt Position [facility]   04/13/21 1005 85 Monitor 127/77 Left upper arm Automatic Sitting DT      PT Pain    Date/Time Pain Type Pref Pain Scale Side Orientation Location Rating: [facility]   04/13/21 1005 Pain Assessment number (Numeric Rating Pain Scale) bilateral knee 0 2 DT   04/13/21 1052 Pain Assessment number (Numeric Rating Pain Scale) bilateral knee -- 3 DT          Prior Living Environment      Most Recent Value   People in Home  child(gm), adult   Living Arrangements  house   Location, Patient Bedroom  second floor, must negotiate stairs to access   Location, Bathroom  second floor, must negotiate stairs to access   Bathroom Access Comment  Pt. reports tub shower, no grab bars or DME          Prior Level of Function      Most Recent Value   Dominant Hand  right   Ambulation  independent   Transferring  independent   Toileting  independent   Bathing  independent   Dressing  independent   Assistive Device/Animal Currently Used at Home  none          IRF PT Evaluation and Treatment - 04/13/21 1005        PT Time Calculation    Start Time  1000     Stop Time  1100     Time Calculation (min)  60 min         Session Details    Document Type  daily treatment/progress note     Mode of Treatment  individual therapy;physical therapy        General Information    Patient Profile Reviewed  yes     General Observations of Patient  received in therapy gym, NAD        Bed Mobility    [place], Supine to Sit  close supervision     Verbal Cues (Supine to Sit)  safety     [place], Sit to Supine  close supervision     Verbal Cues (Sit to Supine)  safety     Assistive Device  none     Comment (Bed Mobility)  on EOM with CL S for safety        Transfers    Transfers  stand pivot transfer        Sit to Stand Transfer    [place], Sit to Stand Transfer  close supervision     Verbal Cues  safety     Assistive Device  walker, front-wheeled     Comment  CL S for safety        Stand to Sit Transfer    [place], Stand to Sit Transfer  close supervision     Verbal Cues  safety     Assistive Device  walker, front-wheeled     Comment  CL S for safety        Stand Pivot Transfer    [place], Stand Pivot/Stand Step Transfer  close supervision     Verbal Cues  hand placement;technique     Assistive Device  walker, front-wheeled     Comment  amb approach to w/c with CL S for safety        Gait Training    [place], Gait  close supervision     Assistive Device  walker, front-wheeled     Distance in Feet  150 feet     Pattern (Gait)  step-through     Deviations/Abnormal Patterns (Gait)  bilateral deviations     Bilateral Gait Deviations  heel strike decreased     Comment (Gait/Stairs)  + 50ft x4with CL S for safety        Stairs Training    [place], Stairs  touching/steadying assist     Assistive Device  railing     Handrail Location (Stairs)  both sides     Number of Stairs  12     Stair Height  6 inches     Ascending Stairs Technique  step-to-step    RLE leading ascend    Descending Stairs Technique  step-to-step    LLE leading to descend    Comment  touchgin assist for improved balance and posture with no overt LOB         Hip (Therapeutic Exercise)    Comment (Hip Therapeutic Exercise)  Supine: AROM heel slides to improve hip and knee AROM/strength 2x10 for BLE        Modality Treatment    Treatment Location 1 (Modality)  B knees     Modality Performed  cryotherapy     Modality Treatment Results  decreased pain     Comment, Modality Treatment  Gel ice packs applied to B knees while in supine with knees in extension to improve B PROM knee extension during rest in between exercises.  Skin check following gel pack removal: unremarkable blanchable redness        Daily Progress Summary (PT)    Progress Toward Functional Goals (PT)  progressing toward functional goals as expected     Daily Outcome Statement (PT)  Pt willing to participate in therapy session and demos improvement with ambulation and increased elevations.  Pt reporting minimal increase in pain t/o session and pt benefits from supine rest breaks with ice packs applied to BLE.  Pt will continue to progress with elevations training and transfer training.     Recommendations (PT)  Continue with stair training, supine AROM, A/PROM measurement                     IRF PT Goals      Most Recent Value   Bed Mobility Goal 1   Activity/Assistive Device  rolling to left, rolling to right, sit to supine, supine to sit at 04/10/2021 0833   [place]  modified independence at 04/10/2021 0833   Time Frame  short-term goal (STG), 1 week at 04/10/2021 0833   Bed Mobility Goal 2   Activity/Assistive Device  rolling to left, rolling to right, sit to supine, supine to sit at 04/10/2021 0833   [place]  modified independence at 04/10/2021 0833   Time Frame  long-term goal (LTG), 2 weeks [STG = LTG] at 04/10/2021 0833   Transfer Goal 1   Activity/Assistive Device  sit-to-stand/stand-to-sit, stand pivot, car transfer, walker, front-wheeled at 04/10/2021 1135   [place]  supervision required, verbal cues required at 04/10/2021 1135   Time Frame  short-term goal (STG), 1 week at  04/10/2021 1135   Transfer Goal 2   Activity/Assistive Device  sit-to-stand/stand-to-sit, car transfer, walker, front-wheeled at 04/10/2021 1135   [place]  modified independence at 04/10/2021 1135   Time Frame  long-term goal (LTG), 2 weeks at 04/10/2021 1135   Gait/Walking Locomotion Goal 1   Activity/Assistive Device  gait (walking locomotion), assistive device use at 04/10/2021 1135   Distance  200 feet at 04/10/2021 1135   [place]  supervision required, verbal cues required at 04/10/2021 1135   Time Frame  short-term goal (STG), 1 week at 04/10/2021 1135   Gait/Walking Locomotion Goal 2   Activity/Assistive Device  gait (walking locomotion), assistive device use at 04/10/2021 1135   Distance  200 feet at 04/10/2021 1135   [place]  modified independence at 04/10/2021 1135   Time Frame  short-term goal (STG), 2 weeks at 04/10/2021 1135   Stairs Goal 1   Activity/Assistive Device  ascending stairs, descending stairs, using handrail, left, cane, straight at 04/10/2021 0833   Number of Stairs  8 at 04/10/2021 0833   [place]  supervision required at 04/10/2021 0833   Time Frame  short-term goal (STG), 1 week at 04/10/2021 0833   Stairs Goal 2   Activity/Assistive Device  ascending stairs, descending stairs, using handrail, left, cane, straight at 04/10/2021 0833   Number of Stairs  12 at 04/10/2021 0833   [place]  modified independence at 04/10/2021 0833   Time Frame  long-term goal (LTG), 2 weeks at 04/10/2021 0833

## 2021-04-13 NOTE — PLAN OF CARE
Plan of Care Review  RETS20 IRF Plan of Care Review: progress ongoing, continue  Plan of Care Reviewed With: patient  Outcome Summary: Pt is alert oriented. C/o pain managed with prn pain meds. Continent with bladder. No Bm. Safety maintained. All meds and care reviewed with pt.

## 2021-04-13 NOTE — PLAN OF CARE
Problem: Rehabilitation (IRF) Plan of Care  Goal: Plan of Care Review  Flowsheets (Taken 4/13/2021 1106)  Plan of Care Reviewed With: patient  RETS20 IRF Plan of Care Review: progress ongoing, continue  Outcome Summary: intorduced self to pt. she had some general questions related to elos. info and education provided which pt appreciated. 1st team is thurs. sarah herrera.

## 2021-04-13 NOTE — PROGRESS NOTES
Patient: Lili Serrato  Location: [facility] [address]  MRN: [id number]  Today's date: 4/13/2021    History of Present Illness  Lili is a 65 y.o. female admitted on 4/9/2021 with Status post bilateral knee replacements [Z96.653]. Principal problem is Status post bilateral knee replacements. Pt with hx of bilateral knee pain due to DJD.  Pain did not respond to conservative management.  Pt admitted to [facility] and underwent bilateral knee arthroplasty by Dr. Youngblood.  Patient was allowed weightbearing as tolerated bilateral lower extremity.      Past Medical History  Lili has a past medical history of Arthritis, Asthma, Depression, GERD (gastroesophageal reflux disease), Hypothyroidism, and Lipid disorder.      OT Vitals    Date/Time Pulse HR Source BP BP Location BP Method Pt Position [facility]   04/13/21 1306 81 Monitor 138/77 Left upper arm Automatic Sitting GD   04/13/21 1358 85 Monitor 147/75 Left upper arm Automatic Sitting GD      OT Pain    Date/Time Pain Type Pref Pain Scale Rating: Activity Onset/Duration [facility]   04/13/21 1306 Pain Assessment word (verbal rating pain scale) 0 - no pain --    04/13/21 1358 Pain Reassessment word (verbal rating pain scale) 0 - no pain --    04/13/21 1400 -- -- -- medication administered;around-the-clock dosing utilized;pain management plan reviewed with patient/caregiver;premedicated for activity WH          Prior Living Environment      Most Recent Value   People in Home  child(gm), adult   Living Arrangements  house   Location, Patient Bedroom  second floor, must negotiate stairs to access   Location, Bathroom  second floor, must negotiate stairs to access   Bathroom Access Comment  Pt. reports tub shower, no grab bars or DME          Prior Level of Function      Most Recent Value   Dominant Hand  right   Ambulation  independent   Transferring  independent   Toileting  independent   Bathing  independent   Dressing  independent  "  Assistive Device/Animal Currently Used at Home  none          Occupational Profile      Most Recent Value   Occupational History/Life Experiences  Pt. works as a clinical trial coordinator, (+) , enjoys knitting, journaling   Patient Goals  \"I want to be able to go out dancing\"          IRF OT Evaluation and Treatment - 04/13/21 1306        OT Time Calculation    Start Time  1300     Stop Time  1400     Time Calculation (min)  60 min        Session Details    Document Type  daily treatment/progress note     Mode of Treatment  occupational therapy;individual therapy        General Information    General Observations of Patient  received in w/c. pleasant and agreeable.        Sit to Stand Transfer    [place], Sit to Stand Transfer  close supervision     Verbal Cues  safety     Assistive Device  walker, front-wheeled     Comment  from w/c. completed multi times        Stand to Sit Transfer    [place], Stand to Sit Transfer  close supervision     Verbal Cues  safety     Assistive Device  walker, front-wheeled     Comment  to w/c. completed multi times        Stand Pivot Transfer    [place], Stand Pivot/Stand Step Transfer  close supervision     Verbal Cues  safety     Assistive Device  walker, front-wheeled     Comment  via amb level c RW. good balance        Safety Issues, Functional Mobility    Comment, Safety Issues/Impairments (Mobility)  OT- Cl S c RW c functional mobility in HHD on even surface in gym.        Balance    Comment, Balance  1. completed high knee marching in stance. 1 x 15 unilaterally and 1 x 15 alternatng feet. CL S for safety. completed bilateral support of RW.        Motor Skills    Functional Endurance  BLE coordination: 1. heel raises c enrique domes 1 x 15 on each foot and 1 x 15 alternating on enrique domes. completed in seated position. 2. toe raises 1 x 15 on each foot and 1 x 15 alternating on enrique domes. completed in seated position. 3. while standing two dome tap one " legged in stance c bilateral support of RW. touching A for safety. 1 x 15 on each foot.        Therapeutic Interventions    Comment, Therapeutic Intervention  educated and demonstrated use of AE for LB dressing: dressing stick, sock aide, LHSH, and reacher. Pt reporting alreadying one for LHSH and reacher. issued dressing stick and sock aide in addition to LHS for bathing. utilized dressing stick for doffing socks and donned socks c sock aide. c Min cueing.        Shoulder (Therapeutic Exercise)    Shoulder (Therapeutic Exercise)  strengthening exercise     Shoulder Strengthening (Therapeutic Exercise)  bilateral     Comment (Shoulder Therapeutic Exercise)  issued HEP c green theraband.        Elbow/Forearm (Therapeutic Exercise)    Elbow/Forearm (Therapeutic Exercise)  strengthening exercise     Elbow/Forearm Strengthening (Therapeutic Exercise)  bilateral     Comment (Elbow/Forearm Therapeutic Exercise)  issued HEP c green theraband.        Daily Progress Summary (OT)    Daily Outcome Statement (OT)  issued AE to improve LB dressing and bathing. issued HEP c theraband to improve strength and endurance for ADL and IADL. completed endurance and balance as well. tolerated session well                     IRF OT Goals      Most Recent Value   Transfer Goal 1   Activity/Assistive Device  toilet at 04/10/2021 0725   [place]  supervision required [cls] at 04/10/2021 0725   Time Frame  short-term goal (STG), 5 - 7 days at 04/10/2021 0725   Transfer Goal 2   Activity/Assistive Device  toilet at 04/10/2021 0725   [place]  modified independence at 04/10/2021 0725   Time Frame  long-term goal (LTG), 2 weeks at 04/10/2021 0725   Transfer Goal 3   Activity/Assistive Device  shower at 04/10/2021 0725   Time Frame  short-term goal (STG), 5 - 7 days at 04/10/2021 0725   Strategies/Barriers  TBA at 04/10/2021 0725   Transfer Goal 4   Activity/Assistive Device  shower at 04/10/2021 0725   Time Frame  long-term goal (LTG),  2 weeks at 04/10/2021 0725   Bathing Goal 1   Activity/Assistive Device  bathing skills, all at 04/10/2021 0725   [place]  minimum assist (75% or more patient effort) at 04/10/2021 0725   Time Frame  short-term goal (STG), 5 - 7 days at 04/10/2021 0725   Bathing Goal 2   Activity/Assistive Device  bathing skills, all at 04/10/2021 0725   [place]  supervision required at 04/10/2021 0725   Time Frame  long-term goal (LTG), 2 weeks at 04/10/2021 0725   UB Dressing Goal 1   Activity/Assistive Device  upper body dressing at 04/10/2021 0725   [place]  supervision required at 04/10/2021 0725   Time Frame  short-term goal (STG), 5 - 7 days at 04/10/2021 0725   UB Dressing Goal 2   Activity/Assistive Device  upper body dressing at 04/10/2021 0725   [place]  modified independence at 04/10/2021 0725   Time Frame  long-term goal (LTG), 2 weeks at 04/10/2021 0725   LB Dressing Goal 1   Activity/Assistive Device  lower body dressing at 04/10/2021 0725   [place]  moderate assist (50-74% patient effort) at 04/10/2021 0725   Time Frame  short-term goal (STG), 5 - 7 days at 04/10/2021 0725   LB Dressing Goal 2   Activity/Assistive Device  lower body dressing at 04/10/2021 0725   [place]  supervision required at 04/10/2021 0725   Time Frame  long-term goal (LTG), 2 weeks at 04/10/2021 0725   Grooming Goal 1   Activity/Assistive Device  grooming skills, all at 04/10/2021 0725   [place]  supervision required at 04/10/2021 0725   Time Frame  short-term goal (STG), 5 - 7 days at 04/10/2021 0725   Grooming Goal 2   Activity/Assistive Device  grooming skills, all at 04/10/2021 0725   [place]  modified independence at 04/10/2021 0725   Time Frame  long-term goal (LTG), 2 weeks at 04/10/2021 0725   Toileting Goal 1   Activity/Assistive Device  toileting skills, all at 04/10/2021 0725   [place]  supervision required [Cl S] at 04/10/2021 4936   Time Frame  short-term goal (STG), 5 - 7 days at  04/10/2021 0725   Toileting Goal 2   Activity/Assistive Device  toileting skills, all at 04/10/2021 0725   [place]  modified independence at 04/10/2021 0725   Time Frame  long-term goal (LTG), 5 - 7 days at 04/10/2021 0725

## 2021-04-13 NOTE — PROGRESS NOTES
Patient: Llii Serrato  Location: [facility] [address]  MRN: [id number]  Today's date: 4/13/2021    History of Present Illness  Lili is a 65 y.o. female admitted on 4/9/2021 with Status post bilateral knee replacements [Z96.653]. Principal problem is Status post bilateral knee replacements. Pt with hx of bilateral knee pain due to DJD.  Pain did not respond to conservative management.  Pt admitted to [facility] and underwent bilateral knee arthroplasty by Dr. Youngblood.  Patient was allowed weightbearing as tolerated bilateral lower extremity.      Past Medical History  Lili has a past medical history of Arthritis, Asthma, Depression, GERD (gastroesophageal reflux disease), Hypothyroidism, and Lipid disorder.      PT Vitals    Date/Time Pulse HR Source BP BP Location BP Method Pt Position [facility]   04/13/21 1505 84 Monitor 136/70 Left upper arm Automatic Sitting DM   04/13/21 1554 88 Monitor 147/77 Left upper arm Automatic Sitting DM      PT Pain    Date/Time Pain Type Pref Pain Scale Side Orientation Location Rating: Rest Onset/Duration [facility]   04/13/21 1505 Pain Assessment number (Numeric Rating Pain Scale) bilateral knee 3 3 diversional activity provided;position adjusted DM   04/13/21 1554 Post Activity number (Numeric Rating Pain Scale) bilateral knee 4 4 position adjusted DM          Prior Living Environment      Most Recent Value   People in Home  child(gm), adult   Living Arrangements  house   Location, Patient Bedroom  second floor, must negotiate stairs to access   Location, Bathroom  second floor, must negotiate stairs to access   Bathroom Access Comment  Pt. reports tub shower, no grab bars or DME          Prior Level of Function      Most Recent Value   Dominant Hand  right   Ambulation  independent   Transferring  independent   Toileting  independent   Bathing  independent   Dressing  independent   Assistive Device/Animal Currently Used at Home  none          IRF PT  Evaluation and Treatment - 04/13/21 1504        PT Time Calculation    Start Time  1500     Stop Time  1600     Time Calculation (min)  60 min        Session Details    Document Type  daily treatment/progress note     Mode of Treatment  physical therapy;individual therapy        General Information    Patient Profile Reviewed  yes     General Observations of Patient  received in gym, pleasant and agreeable        Range of Motion (ROM)    Knee, Left (ROM)  3-105     Knee, Right (ROM)  4-97     Comment: Range of Motion  Measured supine        Sit to Stand Transfer    [place], Sit to Stand Transfer  close supervision     Verbal Cues  safety     Assistive Device  walker, front-wheeled     Comment  from wc        Stand to Sit Transfer    [place], Stand to Sit Transfer  close supervision     Verbal Cues  safety     Assistive Device  walker, front-wheeled     Comment  to wc        Stand Pivot Transfer    [place], Stand Pivot/Stand Step Transfer  close supervision     Verbal Cues  safety     Assistive Device  walker, front-wheeled     Comment  ambulatory approach to wc with RW; wc<>EOB, no AD        Gait Training    [place], Gait  close supervision     Assistive Device  walker, front-wheeled     Distance in Feet  150 feet     Pattern (Gait)  step-through     Deviations/Abnormal Patterns (Gait)  trey decreased;antalgic     Bilateral Gait Deviations  heel strike decreased     Comment (Gait/Stairs)  150ft x1 with RW, Cl S for safety        Stairs Training    [place], Stairs  touching/steadying assist     Assistive Device  railing     Handrail Location (Stairs)  both sides     Number of Stairs  8     Stair Height  7 inches     Ascending Stairs Technique  step-to-step     Descending Stairs Technique  step-to-step     Comment  Steadying assist, ascending LLE leading, descending RLE leading        Balance    Balance Assessment  standing dynamic balance     Dynamic Standing Balance  moderate impairment   "   Comment, Balance  1. Standing unsupported: Static stance, 2 x90sec, Cl S for safety.        Hip (Therapeutic Exercise)    Hip (Therapeutic Exercise)  AROM (active range of motion)     Hip AROM (Therapeutic Exercise)  bilateral;supine     Comment (Hip Therapeutic Exercise)  Supine: glute set, heel slides, hip ABD/ADD        Knee (Therapeutic Exercise)    Knee (Therapeutic Exercise)  AROM (active range of motion)     Knee AROM (Therapeutic Exercise)  bilateral;SAQ (short arc quad);heel slides     Knee AAROM (Therapeutic Exercise)  bilateral;supine;flexion;extension     Comment (Knee Therapeutic Exercise)  Supine: heel slides, quad set, SAQ over bolster        Daily Progress Summary (PT)    Daily Outcome Statement (PT)  Pt tolerated stair training with increased step height of 7\" vs 6\", decreased eccentric control noted. Wll benefit from continued stair training, ambulation tolerance and BLE knee ROM to increase functional mobility.                     IRF PT Goals      Most Recent Value   Bed Mobility Goal 1   Activity/Assistive Device  rolling to left, rolling to right, sit to supine, supine to sit at 04/10/2021 0833   [place]  modified independence at 04/10/2021 0833   Time Frame  short-term goal (STG), 1 week at 04/10/2021 0833   Bed Mobility Goal 2   Activity/Assistive Device  rolling to left, rolling to right, sit to supine, supine to sit at 04/10/2021 0833   [place]  modified independence at 04/10/2021 0833   Time Frame  long-term goal (LTG), 2 weeks [STG = LTG] at 04/10/2021 0833   Transfer Goal 1   Activity/Assistive Device  sit-to-stand/stand-to-sit, stand pivot, car transfer, walker, front-wheeled at 04/10/2021 1135   [place]  supervision required, verbal cues required at 04/10/2021 1135   Time Frame  short-term goal (STG), 1 week at 04/10/2021 1135   Transfer Goal 2   Activity/Assistive Device  sit-to-stand/stand-to-sit, car transfer, walker, front-wheeled at 04/10/2021 1135   [place] "  modified independence at 04/10/2021 1135   Time Frame  long-term goal (LTG), 2 weeks at 04/10/2021 1135   Gait/Walking Locomotion Goal 1   Activity/Assistive Device  gait (walking locomotion), assistive device use at 04/10/2021 1135   Distance  200 feet at 04/10/2021 1135   [place]  supervision required, verbal cues required at 04/10/2021 1135   Time Frame  short-term goal (STG), 1 week at 04/10/2021 1135   Gait/Walking Locomotion Goal 2   Activity/Assistive Device  gait (walking locomotion), assistive device use at 04/10/2021 1135   Distance  200 feet at 04/10/2021 1135   [place]  modified independence at 04/10/2021 1135   Time Frame  short-term goal (STG), 2 weeks at 04/10/2021 1135   Stairs Goal 1   Activity/Assistive Device  ascending stairs, descending stairs, using handrail, left, cane, straight at 04/10/2021 0833   Number of Stairs  8 at 04/10/2021 0833   [place]  supervision required at 04/10/2021 0833   Time Frame  short-term goal (STG), 1 week at 04/10/2021 0833   Stairs Goal 2   Activity/Assistive Device  ascending stairs, descending stairs, using handrail, left, cane, straight at 04/10/2021 0833   Number of Stairs  12 at 04/10/2021 0833   [place]  modified independence at 04/10/2021 0833   Time Frame  long-term goal (LTG), 2 weeks at 04/10/2021 0833

## 2021-04-14 ENCOUNTER — APPOINTMENT (INPATIENT)
Dept: OCCUPATIONAL THERAPY | Facility: REHABILITATION | Age: 66
DRG: 560 | End: 2021-04-14
Payer: COMMERCIAL

## 2021-04-14 ENCOUNTER — APPOINTMENT (INPATIENT)
Dept: PHYSICAL THERAPY | Facility: REHABILITATION | Age: 66
DRG: 560 | End: 2021-04-14
Payer: COMMERCIAL

## 2021-04-14 PROCEDURE — 63700000 HC SELF-ADMINISTRABLE DRUG: Performed by: INTERNAL MEDICINE

## 2021-04-14 PROCEDURE — 97110 THERAPEUTIC EXERCISES: CPT | Mod: GP

## 2021-04-14 PROCEDURE — 97010 HOT OR COLD PACKS THERAPY: CPT | Mod: GP

## 2021-04-14 PROCEDURE — 12800000 HC ROOM AND CARE SEMIPRIVATE REHAB

## 2021-04-14 PROCEDURE — 97530 THERAPEUTIC ACTIVITIES: CPT | Mod: GO

## 2021-04-14 PROCEDURE — 97530 THERAPEUTIC ACTIVITIES: CPT | Mod: GP

## 2021-04-14 PROCEDURE — 97116 GAIT TRAINING THERAPY: CPT | Mod: GP

## 2021-04-14 RX ADMIN — Medication 1000 UNITS: at 16:40

## 2021-04-14 RX ADMIN — CETIRIZINE HYDROCHLORIDE 5 MG: 5 TABLET ORAL at 21:02

## 2021-04-14 RX ADMIN — ACETAMINOPHEN 650 MG: 325 TABLET, FILM COATED ORAL at 12:36

## 2021-04-14 RX ADMIN — DOCUSATE SODIUM 100 MG: 100 CAPSULE, LIQUID FILLED ORAL at 21:01

## 2021-04-14 RX ADMIN — ASPIRIN 325 MG: 325 TABLET, COATED ORAL at 08:51

## 2021-04-14 RX ADMIN — ACETAMINOPHEN 650 MG: 325 TABLET, FILM COATED ORAL at 05:33

## 2021-04-14 RX ADMIN — SENNOSIDES 3 TABLET: 8.6 TABLET, FILM COATED ORAL at 12:36

## 2021-04-14 RX ADMIN — PANTOPRAZOLE SODIUM 20 MG: 20 TABLET, DELAYED RELEASE ORAL at 08:51

## 2021-04-14 RX ADMIN — SERTRALINE 200 MG: 50 TABLET, FILM COATED ORAL at 08:51

## 2021-04-14 RX ADMIN — ROSUVASTATIN CALCIUM 10 MG: 5 TABLET, FILM COATED ORAL at 17:55

## 2021-04-14 RX ADMIN — DOCUSATE SODIUM 100 MG: 100 CAPSULE, LIQUID FILLED ORAL at 08:50

## 2021-04-14 RX ADMIN — TRAMADOL HYDROCHLORIDE 50 MG: 50 TABLET, COATED ORAL at 08:50

## 2021-04-14 RX ADMIN — ACETAMINOPHEN 650 MG: 325 TABLET, FILM COATED ORAL at 17:55

## 2021-04-14 RX ADMIN — TRAMADOL HYDROCHLORIDE 50 MG: 50 TABLET, COATED ORAL at 12:36

## 2021-04-14 RX ADMIN — TRAMADOL HYDROCHLORIDE 50 MG: 50 TABLET, COATED ORAL at 16:40

## 2021-04-14 RX ADMIN — TRAMADOL HYDROCHLORIDE 50 MG: 50 TABLET, COATED ORAL at 01:02

## 2021-04-14 RX ADMIN — TRAMADOL HYDROCHLORIDE 50 MG: 50 TABLET, COATED ORAL at 21:00

## 2021-04-14 RX ADMIN — BUPROPION HYDROCHLORIDE 300 MG: 150 TABLET, FILM COATED, EXTENDED RELEASE ORAL at 08:51

## 2021-04-14 RX ADMIN — AMLODIPINE BESYLATE 10 MG: 5 TABLET ORAL at 21:01

## 2021-04-14 RX ADMIN — LEVOTHYROXINE SODIUM 25 MCG: 25 TABLET ORAL at 05:33

## 2021-04-14 RX ADMIN — ASPIRIN 325 MG: 325 TABLET, COATED ORAL at 16:40

## 2021-04-14 NOTE — PROGRESS NOTES
Patient: Lili Serrato  Location: [facility] [address]  MRN: [id number]  Today's date: 4/14/2021    History of Present Illness  Lili is a 65 y.o. female admitted on 4/9/2021 with Status post bilateral knee replacements [Z96.653]. Principal problem is Status post bilateral knee replacements. Pt with hx of bilateral knee pain due to DJD.  Pain did not respond to conservative management.  Pt admitted to [facility] and underwent bilateral knee arthroplasty by Dr. Youngblood.  Patient was allowed weightbearing as tolerated bilateral lower extremity.      Past Medical History  Lili has a past medical history of Arthritis, Asthma, Depression, GERD (gastroesophageal reflux disease), Hypothyroidism, and Lipid disorder.      PT Pain    Date/Time Pain Type Pref Pain Scale Side Orientation Location Onset/Duration Who   04/14/21 1427 Pain Assessment number (Numeric Rating Pain Scale) right knee 4 premedicated for activity DT   04/14/21 1430 Pain Reassessment number (Numeric Rating Pain Scale) right knee 2 premedicated for activity DT           04/14/21 1430   Pain/Comfort/Sleep   Pain Charting Type Pain Reassessment   Preferred Pain Scale number (Numeric Rating Pain Scale)   (0-10) Pain Rating: Rest 2   Pain Side/Orientation right   Pain Body Location knee   Pain Management Interventions premedicated for activity   Vital Signs   Heart Rate 78   Heart Rate Source Monitor   BP (!) 156/74   BP Location Left upper arm   BP Method Automatic   Patient Position Sitting       Prior Living Environment      Most Recent Value   People in Home  child(gm), adult   Living Arrangements  house   Location, Patient Bedroom  second floor, must negotiate stairs to access   Location, Bathroom  second floor, must negotiate stairs to access   Bathroom Access Comment  Pt. reports tub shower, no grab bars or DME          Prior Level of Function      Most Recent Value   Dominant Hand  right   Ambulation   independent   Transferring  independent   Toileting  independent   Bathing  independent   Dressing  independent   Assistive Device/Animal Currently Used at Home  none          IRF PT Evaluation and Treatment - 04/14/21 1409        PT Time Calculation    Start Time  1400     Stop Time  1500     Time Calculation (min)  60 min        Session Details    Document Type  daily treatment/progress note     Mode of Treatment  individual therapy;physical therapy        General Information    Patient Profile Reviewed  yes     General Observations of Patient  received in therapy gym, nad        Range of Motion (ROM)    Left Lower Extremity (ROM)  left LE ROM is WFL except     Knee, Left (ROM)  3-104     Right Lower Extremity (ROM)  right LE ROM is WFL except     Knee, Right (ROM)  3-95     Comment: Range of Motion  mesured supine with AROM and pt provided additional AAROM with strap        Bed Mobility    [place], Roll Left  close supervision     [place], Roll Right  close supervision     [place], Supine to Sit  close supervision     Verbal Cues (Supine to Sit)  safety     [place], Sit to Supine  close supervision     Verbal Cues (Sit to Supine)  safety     Assistive Device  none     Comment (Bed Mobility)  on EOM with CL S for safety        Sit to Stand Transfer    [place], Sit to Stand Transfer  close supervision     Verbal Cues  safety     Assistive Device  walker, front-wheeled     Comment  for safety        Stand to Sit Transfer    [place], Stand to Sit Transfer  close supervision     Verbal Cues  safety     Assistive Device  walker, front-wheeled     Comment  for safety        Stand Pivot Transfer    [place], Stand Pivot/Stand Step Transfer  close supervision     Verbal Cues  safety     Assistive Device  walker, front-wheeled     Comment  SPT to/from EOM and w/c with CL S for safety        Gait Training    [place], Gait  close supervision     Assistive Device  walker, front-wheeled      Distance in Feet  200 feet     Pattern (Gait)  step-through     Deviations/Abnormal Patterns (Gait)  trey decreased;gait speed decreased     Bilateral Gait Deviations  heel strike decreased     Comment (Gait/Stairs)  +50ft x2 with RW and CL S for safety        Hip (Therapeutic Exercise)    Comment (Hip Therapeutic Exercise)  supine: 2x10 sliding hip abd/add on powder board; 2x10 GS        Knee (Therapeutic Exercise)    Knee (Therapeutic Exercise)  AROM (active range of motion)     Comment (Knee Therapeutic Exercise)  supine: 2x10 heel slides AROM with AAROM from pt with strap; 2x10 QS, SAQ        Modality Treatment    Treatment Location 1 (Modality)  B kneed     Modality Performed  cryotherapy     Modality Treatment Results  decreased pain     Comment, Modality Treatment  gel ice packs applied to posterior BLE in knee extension ~10min; skin check following gel pack removal - unremarkable blanchable redness        Daily Progress Summary (PT)    Progress Toward Functional Goals (PT)  progressing toward functional goals as expected     Daily Outcome Statement (PT)  Pt reporting decreased pain in afternoon session.  Pt demos improvement with supine exercises with no additional symptoms.  Pt will continue to benefit from LE strengthening and elevation training to increase functional independence     Recommendations (PT)  complete elevations with lateral approach next session                     IRF PT Goals      Most Recent Value   Bed Mobility Goal 1   Activity/Assistive Device  rolling to left, rolling to right, sit to supine, supine to sit at 04/10/2021 0833   [place]  modified independence at 04/10/2021 0833   Time Frame  short-term goal (STG), 1 week at 04/10/2021 0833   Bed Mobility Goal 2   Activity/Assistive Device  rolling to left, rolling to right, sit to supine, supine to sit at 04/10/2021 0833   [place]  modified independence at 04/10/2021 0833   Time Frame  long-term goal (LTG), 2 weeks [STG  = LTG] at 04/10/2021 0833   Transfer Goal 1   Activity/Assistive Device  sit-to-stand/stand-to-sit, stand pivot, car transfer, walker, front-wheeled at 04/10/2021 1135   [place]  supervision required, verbal cues required at 04/10/2021 1135   Time Frame  short-term goal (STG), 1 week at 04/10/2021 1135   Transfer Goal 2   Activity/Assistive Device  sit-to-stand/stand-to-sit, car transfer, walker, front-wheeled at 04/10/2021 1135   [place]  modified independence at 04/10/2021 1135   Time Frame  long-term goal (LTG), 2 weeks at 04/10/2021 1135   Gait/Walking Locomotion Goal 1   Activity/Assistive Device  gait (walking locomotion), assistive device use at 04/10/2021 1135   Distance  200 feet at 04/10/2021 1135   [place]  supervision required, verbal cues required at 04/10/2021 1135   Time Frame  short-term goal (STG), 1 week at 04/10/2021 1135   Gait/Walking Locomotion Goal 2   Activity/Assistive Device  gait (walking locomotion), assistive device use at 04/10/2021 1135   Distance  200 feet at 04/10/2021 1135   [place]  modified independence at 04/10/2021 1135   Time Frame  short-term goal (STG), 2 weeks at 04/10/2021 1135   Stairs Goal 1   Activity/Assistive Device  ascending stairs, descending stairs, using handrail, left, cane, straight at 04/10/2021 0833   Number of Stairs  8 at 04/10/2021 0833   [place]  supervision required at 04/10/2021 0833   Time Frame  short-term goal (STG), 1 week at 04/10/2021 0833   Stairs Goal 2   Activity/Assistive Device  ascending stairs, descending stairs, using handrail, left, cane, straight at 04/10/2021 0833   Number of Stairs  12 at 04/10/2021 0833   [place]  modified independence at 04/10/2021 0833   Time Frame  long-term goal (LTG), 2 weeks at 04/10/2021 0833

## 2021-04-14 NOTE — PROGRESS NOTES
History of present illness:      Patient is a 65-year-old female with history of bilateral knee pain due to DJD.  Pain not responding to conservative management.  Patient was admitted to [facility] and underwent bilateral knee arthroplasty by Dr. Youngblood.  Postoperatively he was started on aspirin 325 g twice daily for DVT prevention.  Per records patient does not tolerate Percocet currently on tramadol for pain control.  Patient was allowed weightbearing castrated bilateral lower extremity.     Patient was evaluated by physical medicine rehabilitation deemed to be a good candidate for acute rehabilitation.  Patient now transferred to [facility] also for comprehensive acute inpatient rehabitation admitted on April 9, 2021.     Scheduled Meds:  • acetaminophen  650 mg oral q6h GILL   • amLODIPine  10 mg oral Nightly   • aspirin  325 mg oral BID with meals   • bisacodyL  10 mg rectal Daily (7p)   • buPROPion XL  300 mg oral Daily   • cetirizine  5 mg oral Nightly   • cholecalciferol (vitamin D3)  1,000 Units oral Daily with dinner   • docusate sodium  100 mg oral BID   • levothyroxine  25 mcg oral Daily (6:30a)   • lidocaine  2 patch Topical Daily   • pantoprazole  20 mg oral Daily before breakfast   • polyethylene glycol  17 g oral Daily   • rosuvastatin  10 mg oral Daily (6p)   • senna  3 tablet oral Daily with lunch   • sertraline  200 mg oral Daily     Continuous Infusions:  PRN Meds:.•  acetaminophen  •  albuterol HFA  •  alum-mag hydroxide-simeth  •  carboxymethylcellulose  •  cyclobenzaprine  •  diphenhydrAMINE  •  magnesium hydroxide  •  traMADoL  •  trimethobenzamide     Lab Results   Component Value Date    WBC 8.25 04/10/2021    HGB 10.0 (L) 04/10/2021    HCT 30.0 (L) 04/10/2021    MCV 94.3 04/10/2021     04/10/2021       Lab Results   Component Value Date    GLUCOSE 112 (H) 04/10/2021    CALCIUM 8.4 (L) 04/10/2021     04/10/2021    K 3.7 04/10/2021    CO2 30 04/10/2021     " 04/10/2021    BUN 14 04/10/2021    CREATININE 0.5 (L) 04/10/2021     Subjective: Patient seen and examined no chest pain or shortness of breath, patient is making steady progress in rehab therapies was seen ambulating with therapist with close supervision, with good range of motion bilateral knees.  Doppler ultrasound bilateral extremity was negative on 412.  Patient is making excellent progress had a good day in therapy.      Physical examination:     His examination today showed a 65-year-old female no acute distress.  Patient awake alert oriented x3.     Blood pressure 137/74, pulse 77, temperature 36.9 °C (98.4 °F), temperature source Oral, resp. rate 18, height 1.588 m (5' 2.5\"), weight 102 kg (224 lb 4.8 oz), SpO2 98 %, not currently breastfeeding.       Head normocephalic, sclera nonicteric, mucous brains moist.     Neck supple     Heart regular S1-S2     Lungs respiration nonlabored     GI examination was benign.     Musculoskeletal exam: Passive range of motion within functional limit bilateral upper extremity.    Patient with good bilateral knee range of motion, patient with good quad function bilaterally.  There was no evidence of calf tenderness or foot drop.     Neuro exam: Mental status as above patient able to obey simple commands.  Cranial nerve examination shows face symmetric, tongue midline, ocular motility intact and visual fields are full.  Motor examination bilateral upper extremity 5/5.  Examination of bilateral lower extremity exam was suboptimal due to recent bilateral knee surgery there was hip and ankle 5/5.  Sensory examination grossly normal.  Deep tendon reflexes were symmetrical bilateral upper extremity.  Gait not tested at this time.      Bilateral knee incisions dry clean and intact covered with zip shape dressing, no drainage or erythema both incisions look excellent covered by Telfa/tape    Impression:     Ambulatory ADL dysfunction due to:     DJD status post bilateral " knee arthroplasty, patient is weightbearing as tolerated bilateral lower extremity.  Patient on aspirin for DVT prevention.  Patient on tramadol for pain control.     Postoperative anemia     History of hypertension on Norvasc     Hypothyroidism on Synthroid     History of depression on Wellbutrin and Zoloft     Hyperlipidemia on Crestor     GERD on Protonix     History of asthma on albuterol HFA as needed    Morbid obesity, followed by dietition        Plan:     Patient will continue physical therapy and Occupational Therapy.  We will consult Dr. Orozco for medical management, patient also be followed by Dr. Bonilla psychiatry.  Patient be followed by psychology and case management for support.  Patient will receive 24-hour application nursing for bowel and bladder management, pain monitoring and education.  Rehabitation precautions include cardiac and for precautions.  Will monitor routine labs in addition we will monitor healing of bilateral knee incision healing closely.     Goals: Improve overall function for transfers, ambulation, ADLs and elevation.     Extent of length of stay about 10 days     Discussed case with Dr. Orozco medical consultant, patient and nursing     This patient note has been dictated using speech recognition software.  Inadvertent speech recognition errors should be disregarded. Please do not hesitate to call my office for clarification.     Discussed with patient, Dr. Orozco medical consultant and nursing.     This patient note has been dictated using speech recognition software.  Inadvertent speech recognition errors should be disregarded. Please do not hesitate to call my office for clarification.

## 2021-04-14 NOTE — PROGRESS NOTES
Patient: Lili Serrato  Location: [facility] [address]  MRN: [id number]  Today's date: 4/14/2021    History of Present Illness  Lili is a 65 y.o. female admitted on 4/9/2021 with Status post bilateral knee replacements [Z96.653]. Principal problem is Status post bilateral knee replacements. Pt with hx of bilateral knee pain due to DJD.  Pain did not respond to conservative management.  Pt admitted to [facility] and underwent bilateral knee arthroplasty by Dr. Youngblood.  Patient was allowed weightbearing as tolerated bilateral lower extremity.      Past Medical History  Lili has a past medical history of Arthritis, Asthma, Depression, GERD (gastroesophageal reflux disease), Hypothyroidism, and Lipid disorder.      PT Vitals    Date/Time Pulse HR Source BP BP Location BP Method Pt Position Who   04/14/21 1004 82 Monitor 145/78 Left upper arm Automatic Sitting DT      PT Pain    Date/Time Pain Type Pref Pain Scale Location Onset/Duration Who   04/14/21 1004 Pain Assessment number (Numeric Rating Pain Scale) 3 premedicated for activity DT   04/14/21 1049 Pain Assessment number (Numeric Rating Pain Scale) 3 premedicated for activity DT          Prior Living Environment      Most Recent Value   People in Home  child(gm), adult   Living Arrangements  house   Location, Patient Bedroom  second floor, must negotiate stairs to access   Location, Bathroom  second floor, must negotiate stairs to access   Bathroom Access Comment  Pt. reports tub shower, no grab bars or DME          Prior Level of Function      Most Recent Value   Dominant Hand  right   Ambulation  independent   Transferring  independent   Toileting  independent   Bathing  independent   Dressing  independent   Assistive Device/Animal Currently Used at Home  none          IRF PT Evaluation and Treatment - 04/14/21 1003        PT Time Calculation    Start Time  1000     Stop Time  1100     Time Calculation (min)  60 min         Session Details    Document Type  daily treatment/progress note     Mode of Treatment  individual therapy;physical therapy        General Information    Patient Profile Reviewed  yes     General Observations of Patient  received in therapy gym, NAD, agreeable to session        Bed Mobility    [place], Roll Left  close supervision     [place], Roll Right  close supervision     [place], Supine to Sit  close supervision     Verbal Cues (Supine to Sit)  safety     [place], Sit to Supine  close supervision     Verbal Cues (Sit to Supine)  safety     Comment (Bed Mobility)  on EOM with CL S for safety        Sit to Stand Transfer    [place], Sit to Stand Transfer  close supervision     Verbal Cues  safety     Assistive Device  walker, front-wheeled     Comment  for safety        Stand to Sit Transfer    [place], Stand to Sit Transfer  close supervision     Verbal Cues  safety     Assistive Device  walker, front-wheeled     Comment  for safety        Stand Pivot Transfer    [place], Stand Pivot/Stand Step Transfer  close supervision     Verbal Cues  safety     Assistive Device  walker, front-wheeled     Comment  for safety        Gait Training    [place], Gait  close supervision     Assistive Device  walker, front-wheeled     Distance in Feet  150 feet     Pattern (Gait)  step-through     Deviations/Abnormal Patterns (Gait)  trey decreased     Bilateral Gait Deviations  heel strike decreased     Comment (Gait/Stairs)  + 50ft x4with CL S for safety        Stairs Training    [place], Stairs  touching/steadying assist     Assistive Device  railing     Handrail Location (Stairs)  left side (ascending);right side (ascending);left side (descending);right side (descending)     Number of Stairs  4     Stair Height  6 inches     Ascending Stairs Technique  step-to-step     Descending Stairs Technique  step-to-step     Comment  Trial 1: 4x6in ascending laterally with R hand rail and  BUE support with LLE leading and right hand rail to descend laterally with RLE leading and touching assist for safety + 4x 6in steps with BUE on L handrail to ascend laterally leading with RLE and descendign laterally leading with LLE and BUE with touching assist for steadying.        Hip (Therapeutic Exercise)    Hip (Therapeutic Exercise)  AROM (active range of motion)     Hip AROM (Therapeutic Exercise)  bilateral;supine     Comment (Hip Therapeutic Exercise)  supine: 2x10 glute sets        Knee (Therapeutic Exercise)    Knee (Therapeutic Exercise)  AROM (active range of motion)     Knee AROM (Therapeutic Exercise)  bilateral     Comment (Knee Therapeutic Exercise)  seated: 1x15 LAQs; supine: 2x10 SAQs, QS       Ankle (Therapeutic Exercise)    Ankle (Therapeutic Exercise)  AROM (active range of motion)     Ankle AROM (Therapeutic Exercise)  bilateral     Comment (Ankle Therapeutic Exercise)  seated: 2x15 APs; supine 2x10 APs        Modality Treatment    Treatment Location 1 (Modality)  B knees     Modality Performed  cryotherapy     Modality Treatment Results  decreased pain     Comment, Modality Treatment  gel ice packs applied to posterior BLE in knee extension ~10min; skin check following gel pack removal - unremarkable blanchable redness        Daily Progress Summary (PT)    Progress Toward Functional Goals (PT)  progressing toward functional goals as expected     Daily Outcome Statement (PT)  Pt reporting increased pain this session in RLE>LLE however patient willing and cooperative t/o session.  Pt trialed elevations with ascending and descending laterally with improved stance control however requires touching assist to improve balance especially with L hand rail and leading with RLE due to increased pain.  Pt will continue to benefit from elevation training and LE strengthening.     Recommendations (PT)  cont POC as per pt's tolerance                     IRF PT Goals      Most Recent Value   Bed Mobility  Goal 1   Activity/Assistive Device  rolling to left, rolling to right, sit to supine, supine to sit at 04/10/2021 0833   [place]  modified independence at 04/10/2021 0833   Time Frame  short-term goal (STG), 1 week at 04/10/2021 0833   Bed Mobility Goal 2   Activity/Assistive Device  rolling to left, rolling to right, sit to supine, supine to sit at 04/10/2021 0833   [place]  modified independence at 04/10/2021 0833   Time Frame  long-term goal (LTG), 2 weeks [STG = LTG] at 04/10/2021 0833   Transfer Goal 1   Activity/Assistive Device  sit-to-stand/stand-to-sit, stand pivot, car transfer, walker, front-wheeled at 04/10/2021 1135   [place]  supervision required, verbal cues required at 04/10/2021 1135   Time Frame  short-term goal (STG), 1 week at 04/10/2021 1135   Transfer Goal 2   Activity/Assistive Device  sit-to-stand/stand-to-sit, car transfer, walker, front-wheeled at 04/10/2021 1135   [place]  modified independence at 04/10/2021 1135   Time Frame  long-term goal (LTG), 2 weeks at 04/10/2021 1135   Gait/Walking Locomotion Goal 1   Activity/Assistive Device  gait (walking locomotion), assistive device use at 04/10/2021 1135   Distance  200 feet at 04/10/2021 1135   [place]  supervision required, verbal cues required at 04/10/2021 1135   Time Frame  short-term goal (STG), 1 week at 04/10/2021 1135   Gait/Walking Locomotion Goal 2   Activity/Assistive Device  gait (walking locomotion), assistive device use at 04/10/2021 1135   Distance  200 feet at 04/10/2021 1135   [place]  modified independence at 04/10/2021 1135   Time Frame  short-term goal (STG), 2 weeks at 04/10/2021 1135   Stairs Goal 1   Activity/Assistive Device  ascending stairs, descending stairs, using handrail, left, cane, straight at 04/10/2021 0833   Number of Stairs  8 at 04/10/2021 0833   [place]  supervision required at 04/10/2021 0833   Time Frame  short-term goal (STG), 1 week at 04/10/2021 08   Stairs Goal  2   Activity/Assistive Device  ascending stairs, descending stairs, using handrail, left, cane, straight at 04/10/2021 0833   Number of Stairs  12 at 04/10/2021 0833   [place]  modified independence at 04/10/2021 0833   Time Frame  long-term goal (LTG), 2 weeks at 04/10/2021 0833

## 2021-04-14 NOTE — PLAN OF CARE
"Plan of Care Review  Plan of Care Reviewed With: patient  Progress: improving  Outcome Summary: Pt is AOx3, continent of b/b. Pt on ASA for DVT prevention. Incisions secured with \"ziplines,\" LEILANI. Scant amount of blood draining from incision on L knee; cleaned off and covered with gauze and tegaderm. Pt manages pain with ice, positioning, scheduled Tylenol and PRN Tramadol.  "

## 2021-04-14 NOTE — PLAN OF CARE
Problem: Rehabilitation (IRF) Plan of Care  Goal: Plan of Care Review  Outcome: Progressing  Flowsheets (Taken 4/14/2021 0257)  Outcome Summary: Pt is AAO x 3 and able to make her needs known. Due meds administered. B/L knee oain is being managed with PRN tramadol effectively. Patient slept comfortably with call bell in reach.   Plan of Care Review  Outcome Summary: Pt is AAO x 3 and able to make her needs known. Due meds administered. B/L knee oain is being managed with PRN tramadol effectively. Patient slept comfortably with call bell in reach.

## 2021-04-14 NOTE — PROGRESS NOTES
[facility] Internal Medicine Progress Note          Patient was seen and examined at bedside.    Subjective:   4/13/21:  B/l LE venous compression U/S 4/12/21:  No evidence for DVT identified in the bilateral lower extremities. There is a Baker's cyst in the right leg measuring 2 x 4 cm. There is a Baker's cyst in the left lower extremity measuring 2 x 2.7 cm.   No new complaints, her pain is manageable, her PT/OT is progressing well.     4/14/21:  Stable, no new complaints, her PT/OT is progressing well   Objective   Vital signs in last 24 hours:  Temp:  [36.2 °C (97.2 °F)-37 °C (98.6 °F)] 36.6 °C (97.9 °F)  Heart Rate:  [79-88] 82  Resp:  [18] 18  BP: (122-147)/(67-78) 145/78    No intake or output data in the 24 hours ending 04/14/21 1129  Intake/Output this shift:  No intake/output data recorded.   Review of Systems:  All other systems reviewed and negative except as noted in the HPI.   Objective      Labs  reviewed his labs thoroughly   Lab Results   Component Value Date    WBC 8.25 04/10/2021    HGB 10.0 (L) 04/10/2021    HCT 30.0 (L) 04/10/2021    MCV 94.3 04/10/2021     04/10/2021     Lab Results   Component Value Date    GLUCOSE 112 (H) 04/10/2021    CALCIUM 8.4 (L) 04/10/2021     04/10/2021    K 3.7 04/10/2021    CO2 30 04/10/2021     04/10/2021    BUN 14 04/10/2021    CREATININE 0.5 (L) 04/10/2021       Imaging  OSH imaging study reports reviewed       Full Code    Physical Exam:  Head/Ear/Nose/Throat: normocephalic; atraumatic; moisture mouth mm, no oropharyngeal thrush noted.   Eyes: anicteric sclera, EOMI; PERRL.   Neck : supple, no JVD, no carotid bruits appeciated.   Respiratory: no evidence of labored breathing, lung sounds CTA b/l, good aeration bibasilar area, no w/r/c.   Cardiovascular: RRR; normal S1, S2; no m/r/g; no S3 or S4.   Gastrointestinal: soft; NT; BS normal; mildly distended; no CVAT b/l.   Genitourinary: no graham.   Extremities : s/p b/l TKA, incisional site  with dressing, focal c/d/i .   Neurological: AO x 3, fluent speeches, following commands, CNS II-XII grossly intact; no focal neurologic deficits.   Behavior/Emotional: in NAD, appropriate; cooperative.   Skin: clean, dry and intact.     Plan of care was discussed with patient, RN, and PMR attending     Assessment   CC: B/l knee severe DJD, failed conservative treatments, s/p b/l TKA, ambulatory dysfunction.     65 y.o.female, with PMH of obesity, HTN, HLD, hypothyroidism, anxiety/depression, GERD, seasonal allergy, h/o asthma, who has advanced b/l knee DJD, failed multiple outpatient conservative treatments, associated with progressively worsening b/l knee pain and difficulty with her ambulation, admitted to [facility] on 4/6/21 and underwent an elective b/l TKA . w/o procedure related complications, transferred to [facility] on 4/9/21 for post op inpatient acute rehab to address her ambulatory dysfunction. Her post op course was not complicated, post op acute pain , on prn tramadol, she seemed not tolerating oxycodone well, post op anemia but no need PRBCm H/H stable, on  mg bid for weeks for post op b/l LE DVT prophylaxis.     1. advanced b/l knee DJD, failed conservative treatments, progressively worsening b/l knee pain with ambulatory dysfunction,  s/p elective B/L TKA : inpt rehab, pain management, DVT prophylaxis, surgical site wound care/dermal defense, f/u with orthopedic as scheduled.     2. post op acute pain: pain management with titration based on regular pain scale evaluation, provide topical Lidoderm patch ,  ice packs, prn muscle relaxant.     3. post op blood loss anemia: f/u H/H, no need PRBC based on current H/H level, po iron with Vit C.     4. GI-constipation, GERD: provide constipation bowel regimen, po hydration, fiber intake, timed toilet visits.  PPI for GERD.     5. DVT prophy: SCD, TEDs, early ambulation, po ecotrin per ortho, check LE venous DUS to r/o DVT.       6. HTN, Dyslipidemia: on  amlodipine and statin. Orthostatic hypotension prevention.     7. -urinary retention: timed voiding, monitor PVR with cic, check UA and UCX.     8. Hypothyroidism: cont home dose of levothyroxine.     9. Anxiety, depression: cont wellbutrin and zoloft, consult psychiatrist and psychologist CBT.     Billing code: [id number]  Diagnoses:  Patient Active Problem List   Diagnosis   • Osteoarthritis of knees, bilateral   • Status post total bilateral knee replacement   • HTN (hypertension)   • Hypercholesterolemia   • Hypothyroidism   • Depression   • Asthma   • Seasonal allergies   • GERD (gastroesophageal reflux disease)   • Obesity   • Status post bilateral knee replacements   • Leg swelling      Aj Orozco MD  4/14/2021

## 2021-04-14 NOTE — PROGRESS NOTES
"Patient: Lili Serrato  Location: [facility] [address]  MRN: [id number]  Today's date: 4/14/2021    History of Present Illness  Lili is a 65 y.o. female admitted on 4/9/2021 with Status post bilateral knee replacements [Z96.653]. Principal problem is Status post bilateral knee replacements. Pt with hx of bilateral knee pain due to DJD.  Pain did not respond to conservative management.  Pt admitted to [facility] and underwent bilateral knee arthroplasty by Dr. Youngblood.  Patient was allowed weightbearing as tolerated bilateral lower extremity.      Past Medical History  Lili has a past medical history of Arthritis, Asthma, Depression, GERD (gastroesophageal reflux disease), Hypothyroidism, and Lipid disorder.      OT Vitals    Date/Time Pulse HR Source BP BP Location BP Method Pt Position Who   04/14/21 1102 78 Monitor 125/75 Left upper arm Automatic Sitting GD      OT Pain    Date/Time Pain Type Pref Pain Scale Side Orientation Location Onset/Duration Who   04/14/21 1102 Pain Assessment number (Numeric Rating Pain Scale) right knee 3 premedicated for activity GD          Prior Living Environment      Most Recent Value   People in Home  child(gm), adult   Living Arrangements  house   Location, Patient Bedroom  second floor, must negotiate stairs to access   Location, Bathroom  second floor, must negotiate stairs to access   Bathroom Access Comment  Pt. reports tub shower, no grab bars or DME          Prior Level of Function      Most Recent Value   Dominant Hand  right   Ambulation  independent   Transferring  independent   Toileting  independent   Bathing  independent   Dressing  independent   Assistive Device/Animal Currently Used at Home  none          Occupational Profile      Most Recent Value   Occupational History/Life Experiences  Pt. works as a clinical trial coordinator, (+) , enjoys knitting, journaling   Patient Goals  \"I want to be able to go out dancing\" "          [facility] OT Evaluation and Treatment - 04/14/21 1105        OT Time Calculation    Start Time  1100     Stop Time  1200     Time Calculation (min)  60 min        Session Details    Document Type  daily treatment/progress note     Mode of Treatment  occupational therapy;individual therapy        General Information    General Observations of Patient  direct handoff from PT.         Coping Strategies    Trust Relationship/Rapport  emotional support provided;empathic listening provided;thoughts/feelings acknowledged        Cognition/Psychosocial    Comment, Cognition  Pt reported tearful during PT session due to pain. Pt reporting appreication for change of scenery and diversional task        Sit to Stand Transfer    [place], Sit to Stand Transfer  close supervision     Verbal Cues  safety     Assistive Device  walker, front-wheeled     Comment  from w/c.        Stand to Sit Transfer    [place], Stand to Sit Transfer  close supervision     Verbal Cues  safety     Assistive Device  walker, front-wheeled     Comment  to w/c        Stand Pivot Transfer    [place], Stand Pivot/Stand Step Transfer  close supervision     Verbal Cues  safety     Assistive Device  walker, front-wheeled     Comment  via amb level c RW in greenhouse. good balance and safety        Safety Issues, Functional Mobility    Comment, Safety Issues/Impairments (Mobility)  OT- CL S c RW [facility] in [facility]        Therapeutic Interventions    Comment, Therapeutic Intervention  completed fine motor coordination while seated at tabletop for diversional activity from pain. increased time to complete. good coordination. utilized tweezers to  pressed flowers. completed card and bookmark        Daily Progress Summary (OT)    Daily Outcome Statement (OT)  tolerated session well. completed functional mobility in greenhouse and completed diversional task in seated position.                      IRF OT Goals      Most Recent Value   Transfer  Goal 1   Activity/Assistive Device  toilet at 04/10/2021 0725   [place]  supervision required [cls] at 04/10/2021 0725   Time Frame  short-term goal (STG), 5 - 7 days at 04/10/2021 0725   Transfer Goal 2   Activity/Assistive Device  toilet at 04/10/2021 0725   [place]  modified independence at 04/10/2021 0725   Time Frame  long-term goal (LTG), 2 weeks at 04/10/2021 0725   Transfer Goal 3   Activity/Assistive Device  shower at 04/10/2021 0725   Time Frame  short-term goal (STG), 5 - 7 days at 04/10/2021 0725   Strategies/Barriers  TBA at 04/10/2021 0725   Transfer Goal 4   Activity/Assistive Device  shower at 04/10/2021 0725   Time Frame  long-term goal (LTG), 2 weeks at 04/10/2021 0725   Bathing Goal 1   Activity/Assistive Device  bathing skills, all at 04/10/2021 0725   [place]  minimum assist (75% or more patient effort) at 04/10/2021 0725   Time Frame  short-term goal (STG), 5 - 7 days at 04/10/2021 0725   Bathing Goal 2   Activity/Assistive Device  bathing skills, all at 04/10/2021 0725   [place]  supervision required at 04/10/2021 0725   Time Frame  long-term goal (LTG), 2 weeks at 04/10/2021 0725   UB Dressing Goal 1   Activity/Assistive Device  upper body dressing at 04/10/2021 0725   [place]  supervision required at 04/10/2021 0725   Time Frame  short-term goal (STG), 5 - 7 days at 04/10/2021 0725   UB Dressing Goal 2   Activity/Assistive Device  upper body dressing at 04/10/2021 0725   [place]  modified independence at 04/10/2021 0725   Time Frame  long-term goal (LTG), 2 weeks at 04/10/2021 0725   LB Dressing Goal 1   Activity/Assistive Device  lower body dressing at 04/10/2021 0725   [place]  moderate assist (50-74% patient effort) at 04/10/2021 0725   Time Frame  short-term goal (STG), 5 - 7 days at 04/10/2021 0725   LB Dressing Goal 2   Activity/Assistive Device  lower body dressing at 04/10/2021 0725   [place]  supervision required at 04/10/2021 0725   Time  Frame  long-term goal (LTG), 2 weeks at 04/10/2021 0725   Grooming Goal 1   Activity/Assistive Device  grooming skills, all at 04/10/2021 0725   [place]  supervision required at 04/10/2021 0725   Time Frame  short-term goal (STG), 5 - 7 days at 04/10/2021 0725   Grooming Goal 2   Activity/Assistive Device  grooming skills, all at 04/10/2021 0725   [place]  modified independence at 04/10/2021 0725   Time Frame  long-term goal (LTG), 2 weeks at 04/10/2021 0725   Toileting Goal 1   Activity/Assistive Device  toileting skills, all at 04/10/2021 0725   [place]  supervision required [Cl S] at 04/10/2021 0725   Time Frame  short-term goal (STG), 5 - 7 days at 04/10/2021 0725   Toileting Goal 2   Activity/Assistive Device  toileting skills, all at 04/10/2021 0725   [place]  modified independence at 04/10/2021 0725   Time Frame  long-term goal (LTG), 5 - 7 days at 04/10/2021 0725

## 2021-04-15 ENCOUNTER — APPOINTMENT (INPATIENT)
Dept: PHYSICAL THERAPY | Facility: REHABILITATION | Age: 66
DRG: 560 | End: 2021-04-15
Payer: COMMERCIAL

## 2021-04-15 ENCOUNTER — APPOINTMENT (INPATIENT)
Dept: OCCUPATIONAL THERAPY | Facility: REHABILITATION | Age: 66
DRG: 560 | End: 2021-04-15
Payer: COMMERCIAL

## 2021-04-15 PROCEDURE — 97530 THERAPEUTIC ACTIVITIES: CPT | Mod: GP | Performed by: PHYSICAL THERAPIST

## 2021-04-15 PROCEDURE — 63700000 HC SELF-ADMINISTRABLE DRUG: Performed by: INTERNAL MEDICINE

## 2021-04-15 PROCEDURE — 97110 THERAPEUTIC EXERCISES: CPT | Mod: GP,CQ

## 2021-04-15 PROCEDURE — 97530 THERAPEUTIC ACTIVITIES: CPT | Mod: GP,CQ

## 2021-04-15 PROCEDURE — 12800000 HC ROOM AND CARE SEMIPRIVATE REHAB

## 2021-04-15 PROCEDURE — 97116 GAIT TRAINING THERAPY: CPT | Mod: GP | Performed by: PHYSICAL THERAPIST

## 2021-04-15 PROCEDURE — 97110 THERAPEUTIC EXERCISES: CPT | Mod: GP | Performed by: PHYSICAL THERAPIST

## 2021-04-15 PROCEDURE — 97535 SELF CARE MNGMENT TRAINING: CPT | Mod: GO

## 2021-04-15 PROCEDURE — 97116 GAIT TRAINING THERAPY: CPT | Mod: GP,CQ

## 2021-04-15 RX ADMIN — Medication 1000 UNITS: at 17:06

## 2021-04-15 RX ADMIN — ASPIRIN 325 MG: 325 TABLET, COATED ORAL at 17:06

## 2021-04-15 RX ADMIN — SERTRALINE 200 MG: 50 TABLET, FILM COATED ORAL at 07:39

## 2021-04-15 RX ADMIN — PANTOPRAZOLE SODIUM 20 MG: 20 TABLET, DELAYED RELEASE ORAL at 07:39

## 2021-04-15 RX ADMIN — LEVOTHYROXINE SODIUM 25 MCG: 25 TABLET ORAL at 05:48

## 2021-04-15 RX ADMIN — TRAMADOL HYDROCHLORIDE 50 MG: 50 TABLET, COATED ORAL at 23:24

## 2021-04-15 RX ADMIN — ACETAMINOPHEN 650 MG: 325 TABLET, FILM COATED ORAL at 23:24

## 2021-04-15 RX ADMIN — CETIRIZINE HYDROCHLORIDE 5 MG: 5 TABLET ORAL at 21:01

## 2021-04-15 RX ADMIN — SENNOSIDES 3 TABLET: 8.6 TABLET, FILM COATED ORAL at 12:10

## 2021-04-15 RX ADMIN — ACETAMINOPHEN 650 MG: 325 TABLET, FILM COATED ORAL at 01:03

## 2021-04-15 RX ADMIN — DOCUSATE SODIUM 100 MG: 100 CAPSULE, LIQUID FILLED ORAL at 07:40

## 2021-04-15 RX ADMIN — ASPIRIN 325 MG: 325 TABLET, COATED ORAL at 07:39

## 2021-04-15 RX ADMIN — TRAMADOL HYDROCHLORIDE 50 MG: 50 TABLET, COATED ORAL at 18:29

## 2021-04-15 RX ADMIN — ROSUVASTATIN CALCIUM 10 MG: 5 TABLET, FILM COATED ORAL at 17:06

## 2021-04-15 RX ADMIN — TRAMADOL HYDROCHLORIDE 50 MG: 50 TABLET, COATED ORAL at 13:55

## 2021-04-15 RX ADMIN — TRAMADOL HYDROCHLORIDE 50 MG: 50 TABLET, COATED ORAL at 01:03

## 2021-04-15 RX ADMIN — ACETAMINOPHEN 650 MG: 325 TABLET, FILM COATED ORAL at 12:10

## 2021-04-15 RX ADMIN — BUPROPION HYDROCHLORIDE 300 MG: 150 TABLET, FILM COATED, EXTENDED RELEASE ORAL at 07:39

## 2021-04-15 RX ADMIN — AMLODIPINE BESYLATE 10 MG: 5 TABLET ORAL at 21:01

## 2021-04-15 RX ADMIN — TRAMADOL HYDROCHLORIDE 50 MG: 50 TABLET, COATED ORAL at 10:02

## 2021-04-15 RX ADMIN — ACETAMINOPHEN 650 MG: 325 TABLET, FILM COATED ORAL at 17:06

## 2021-04-15 RX ADMIN — TRAMADOL HYDROCHLORIDE 50 MG: 50 TABLET, COATED ORAL at 05:48

## 2021-04-15 RX ADMIN — DOCUSATE SODIUM 100 MG: 100 CAPSULE, LIQUID FILLED ORAL at 21:01

## 2021-04-15 RX ADMIN — ACETAMINOPHEN 650 MG: 325 TABLET, FILM COATED ORAL at 05:48

## 2021-04-15 NOTE — PLAN OF CARE
Plan of Care Review  Plan of Care Reviewed With: patient  Progress: improving  Outcome Summary: Pt is alert oriented. C/o pain amnaged with prn pain meds. Continent with bladder. No BM. Safety maintained.  Gauze dressing on left knee clean and dry. All meds and care reviewed with pt.

## 2021-04-15 NOTE — PROGRESS NOTES
"Patient: Lili Serrato  Location: [facility] [address]  MRN: [id number]  Today's date: 4/15/2021    History of Present Illness  Lili is a 65 y.o. female admitted on 4/9/2021 with Status post bilateral knee replacements [Z96.653]. Principal problem is Status post bilateral knee replacements. Pt with hx of bilateral knee pain due to DJD.  Pain did not respond to conservative management.  Pt admitted to [facility] and underwent bilateral knee arthroplasty by Dr. Youngblood.  Patient was allowed weightbearing as tolerated bilateral lower extremity.      Past Medical History  Lili has a past medical history of Arthritis, Asthma, Depression, GERD (gastroesophageal reflux disease), Hypothyroidism, and Lipid disorder.      OT Vitals    Date/Time Pulse HR Source BP BP Location BP Method Pt Position [facility]   04/15/21 0732 81 Monitor 127/62 Right upper arm Automatic Lying GD      OT Pain    Date/Time Pain Type Pref Pain Scale Side Orientation [facility]   04/15/21 0732 Pain Assessment number (Numeric Rating Pain Scale) bilateral knee GD          Prior Living Environment      Most Recent Value   People in Home  child(gm), adult   Living Arrangements  house   Location, Patient Bedroom  second floor, must negotiate stairs to access   Location, Bathroom  second floor, must negotiate stairs to access   Bathroom Access Comment  Pt. reports tub shower, no grab bars or DME          Prior Level of Function      Most Recent Value   Dominant Hand  right   Ambulation  independent   Transferring  independent   Toileting  independent   Bathing  independent   Dressing  independent   Assistive Device/Animal Currently Used at Home  other (see comments) [OT provided DME recs]          Occupational Profile      Most Recent Value   Occupational History/Life Experiences  Pt. works as a clinical trial coordinator, (+) , enjoys knitting, journaling   Patient Goals  \"I want to be able to go out dancing\"    "       [facility] OT Evaluation and Treatment - 04/15/21 0732        OT Time Calculation    Start Time  0730     Stop Time  0830     Time Calculation (min)  60 min        Session Details    Document Type  discharge evaluation     Mode of Treatment  occupational therapy;individual therapy        General Information    General Observations of Patient  received awake in bed. pleasant and agreeable.        Bed Mobility    Comment (Bed Mobility)  OT- Mod I from supine to sit in prep for transfer. HOB elevated and utilized bedrails.         Sit to Stand Transfer    [place], Sit to Stand Transfer  modified independence     Verbal Cues  --     Assistive Device  walker, front-wheeled     Comment  from EOB, toilet, and tub bench. completed multi times        Stand to Sit Transfer    [place], Stand to Sit Transfer  modified independence     Verbal Cues  --     Assistive Device  walker, front-wheeled     Comment  to EOB, toilet, and tub bench. completed multi times.         Stand Pivot Transfer    [place], Stand Pivot/Stand Step Transfer  modified independence     Verbal Cues  --     Assistive Device  walker, front-wheeled     Comment  via amb level c RW. completed multi times.        Toilet Transfer    Transfer Technique  stand pivot     [place], Toilet Transfer  modified independence     Verbal Cues  --     Assistive Device  grab bars/safety frame;walker, front-wheeled;other (see comments)    comfort height toilet    Comment  via amb level c RW        Shower Transfer    Transfer Technique  stand pivot     [place], Shower Transfer  modified independence     Verbal Cues  --     Assistive Device  grab bars/tub rail;tub bench;walker, front-wheeled     Comment  via amb level c RW        Safety Issues, Functional Mobility    Comment, Safety Issues/Impairments (Mobility)  OT- Mod I c RW in room for ADL routine.        Therapeutic Interventions    Comment, Therapeutic Intervention  issued DME packet and recommending  shower chair or tub bench for home. OT educated on not returning to driving until cleared by doctor. Pt receptive and agreeable to education and recommendations        Bathing    Self-Performance  chest;left arm;right arm;abdomen;front perineal area;buttocks;right upper leg;left upper leg;left lower leg, including foot;right lower leg, including foot     [place]  modified independence     Position  supported sitting;supported standing     Setup Assistance  obtain supplies;adaptive equipment setup     Adaptive Equipment  grab bar/tub rail;hand-held shower spray hose;tub bench;long-handled sponge     Comment  completed full wet shower in barrier free shower stall c tub bench and grab bars. increased time to complete.         Upper Body Dressing    Tasks  don;bra/undergarment;pull over garment     Self-Performance  obtains clothes;threads left arm, bra/undershirt;threads right arm, bra/undershirt;fastens bra;pulls bra/undershirt over head/around back;pulls bra/undershirt down/adjusts;threads left arm, shirt;threads right arm, shirt;pulls shirt over head/around back;pulls shirt down/adjusts     [place]  modified independence     Position  supported standing;supported sitting     Adaptive Equipment  none     Comment  including clothing retrieval        Lower Body Dressing    Tasks  don;underwear;pants/bottoms;socks     Self-Performance  threads left leg, underpants;threads right leg, underpants;pulls underpants up or down;threads left leg, pants/shorts;threads right leg, pants/shorts;pulls pants/shorts up or down;dons/doffs left sock;dons/doffs right sock     [place] Assistance  adaptive equipment setup     [place]  modified independence     Position  supported standing;supported sitting     Adaptive Equipment  sock aid     [place], Footwear  modified independence     Comment  including clothing retrieval        Grooming    Self-Performance  washes, rinses and dries face;washes, rinses and dries  hands;brushes/elena hair;oral care (brushing teeth, cleaning dentures)     [place]  modified independence     Position  supported standing     Adaptive Equipment  none     [place], Oral Hygiene  modified independence     Comment  completed standing at sink        Toileting    [place]  modified independence     Position  supported sitting;supported standing     Adaptive Equipment  accessible height toilet;grab bar/safety frame        Self-Feeding    [place]  independent     Position  sitting up in bed     Comment  completed breakfast tray post ADL per Pt request.        Daily Progress Summary (OT)    Daily Outcome Statement (OT)  completed ADL routine and performing at Mod I level c RW for all transfers and ADL.                      IRF OT Goals      Most Recent Value   Transfer Goal 1   Activity/Assistive Device  toilet at 04/10/2021 0725   [place]  supervision required [cls] at 04/10/2021 0725   Time Frame  short-term goal (STG), 5 - 7 days at 04/10/2021 0725   Transfer Goal 2   Activity/Assistive Device  toilet at 04/10/2021 0725   [place]  modified independence at 04/10/2021 0725   Time Frame  long-term goal (LTG), 2 weeks at 04/10/2021 0725   Transfer Goal 3   Activity/Assistive Device  shower at 04/10/2021 0725   Time Frame  short-term goal (STG), 5 - 7 days at 04/10/2021 0725   Strategies/Barriers  TBA at 04/10/2021 0725   Transfer Goal 4   Activity/Assistive Device  shower at 04/10/2021 0725   Time Frame  long-term goal (LTG), 2 weeks at 04/10/2021 0725   Bathing Goal 1   Activity/Assistive Device  bathing skills, all at 04/10/2021 0725   [place]  minimum assist (75% or more patient effort) at 04/10/2021 0725   Time Frame  short-term goal (STG), 5 - 7 days at 04/10/2021 0725   Bathing Goal 2   Activity/Assistive Device  bathing skills, all at 04/10/2021 0725   [place]  supervision required at 04/10/2021 0725   Time Frame  long-term goal (LTG), 2 weeks at 04/10/2021 0725    UB Dressing Goal 1   Activity/Assistive Device  upper body dressing at 04/10/2021 0725   [place]  supervision required at 04/10/2021 0725   Time Frame  short-term goal (STG), 5 - 7 days at 04/10/2021 0725   UB Dressing Goal 2   Activity/Assistive Device  upper body dressing at 04/10/2021 0725   [place]  modified independence at 04/10/2021 0725   Time Frame  long-term goal (LTG), 2 weeks at 04/10/2021 0725   LB Dressing Goal 1   Activity/Assistive Device  lower body dressing at 04/10/2021 0725   [place]  moderate assist (50-74% patient effort) at 04/10/2021 0725   Time Frame  short-term goal (STG), 5 - 7 days at 04/10/2021 0725   LB Dressing Goal 2   Activity/Assistive Device  lower body dressing at 04/10/2021 0725   [place]  supervision required at 04/10/2021 0725   Time Frame  long-term goal (LTG), 2 weeks at 04/10/2021 0725   Grooming Goal 1   Activity/Assistive Device  grooming skills, all at 04/10/2021 0725   [place]  supervision required at 04/10/2021 0725   Time Frame  short-term goal (STG), 5 - 7 days at 04/10/2021 0725   Grooming Goal 2   Activity/Assistive Device  grooming skills, all at 04/10/2021 0725   [place]  modified independence at 04/10/2021 0725   Time Frame  long-term goal (LTG), 2 weeks at 04/10/2021 0725   Toileting Goal 1   Activity/Assistive Device  toileting skills, all at 04/10/2021 0725   [place]  supervision required [Cl S] at 04/10/2021 0725   Time Frame  short-term goal (STG), 5 - 7 days at 04/10/2021 0725   Toileting Goal 2   Activity/Assistive Device  toileting skills, all at 04/10/2021 0725   [place]  modified independence at 04/10/2021 0725   Time Frame  long-term goal (LTG), 5 - 7 days at 04/10/2021 0725

## 2021-04-15 NOTE — PATIENT CARE CONFERENCE
Inpatient Rehabilitation Team Conference Note  Date: 4/15/2021  Time: 11:22 AM       Patient Name: Lili Serrato     Medical Record Number: [id number]   YOB: 1955  Sex: Female      Room/Bed: 121/121D  Payor Info: Payor: [facility] / Plan: [employer] / Product Type: Managed Care /     Admitting Diagnosis: Status post bilateral knee replacements [Z96.653]   Admit Date/Time: 4/9/2021  4:44 PM  Admission Comments: No comment available     Primary Diagnosis: Status post bilateral knee replacements  Principal Problem: Status post bilateral knee replacements    Patient Active Problem List    Diagnosis Date Noted   • Status post bilateral knee replacements 04/09/2021   • Leg swelling 04/09/2021   • Osteoarthritis of knees, bilateral 04/07/2021   • Status post total bilateral knee replacement 04/07/2021   • HTN (hypertension) 04/07/2021   • Hypercholesterolemia 04/07/2021   • Hypothyroidism 04/07/2021   • Depression 04/07/2021   • Asthma 04/07/2021   • Seasonal allergies 04/07/2021   • GERD (gastroesophageal reflux disease) 04/07/2021   • Obesity 04/07/2021       Premorbid Status:  Dominant Hand: right  Ambulation: independent  Transferring: independent  Toileting: independent  Bathing: independent  Dressing: independent  Eating: independent  Communication: understands/communicates without difficulty  Swallowing: swallows foods/liquids without difficulty  Assistive Device/Animal Currently Used at Home: other (see comments) (OT provided DME recs)  Prior Level of Function Comment: I with all ADL's,  mobility, I ADL's. (+) .  Works from home full time.      Current Functional Status:  Mobility  Gait  [place], Gait: close supervision  Assistive Device: walker, front-wheeled  Comment (Gait/Stairs): +50ft x2 with RW and CL S for safety    Stairs  [place], Stairs: touching/steadying assist  Assistive Device: railing  Handrail Location (Stairs): left side (ascending);right side  (descending)  Number of Stairs: 12  Stair Height: 6 inches  Comment: Trial 1: 4x6in ascending laterally with R hand rail and BUE support with LLE leading and right hand rail to descend laterally with RLE leading and touching assist for safety + 4x 6in steps with BUE on L handrail to ascend laterally leading with RLE and descendign laterally leading with LLE and BUE with touching assist for steadying.    Wheelchair  Functional Mobility Training: forward propulsion  [place], Forward Propulsion: dependent (less than 25% patient effort)  Distance Propelled in Feet: 45 feet  Comment, Functional Mobility: pt D pushed in w/c by PT; w/c mobility not a goal for d/c; shortened leg rests, padded foot rests, and adjusted elevation of legrests for comfort & improved knee positioning    Transfers  [place], Roll Left: close supervision  [place], Roll Right: close supervision  [place], Supine to Sit: close supervision  Verbal Cues (Supine to Sit): safety  [place], Sit to Supine: close supervision  Verbal Cues (Sit to Supine): safety  Assistive Device: none  Comment (Bed Mobility): OT- S from supine to sit in prep for transfer. HOB elevated and utilized bedrails.   Maintains Weight-Bearing Status (Transfers): able to maintain  [place], Bed to Chair: touching/steadying assist;verbal cues;nonverbal cues (demo/gesture)  Verbal Cues: hand placement;safety  Assistive Device: walker, front-wheeled  Comment: PTA pt was independent without an AD. Unsafe to attempt transfers without introduction of AD at this time due to balance, strength, and ROM deficits.  [place], Chair to Bed: unable to assess  Comment: PTA pt was independent without an AD. Unsafe to attempt transfers without introduction of AD at this time due to balance, strength, and ROM deficits.  [place], Sit to Stand Transfer: supervision  Verbal Cues: safety  Assistive Device: walker, front-wheeled  Comment: from EOB, toilet, and tub bench.  completed multi times  [place], Stand to Sit Transfer: supervision  Verbal Cues: safety  Assistive Device: walker, front-wheeled  Comment: to EOB, toilet, and tub bench. completed multi times.   [place], Stand Pivot/Stand Step Transfer: supervision  Verbal Cues: safety  Assistive Device: walker, front-wheeled  Comment: via amb level c RW. completed multi times.    Transfer Technique: stand pivot  [place], Toilet Transfer: supervision  Verbal Cues: safety  Assistive Device: grab bars/safety frame;walker, front-wheeled;other (see comments) (comfort height toilet)  Comment: via amb level c RW  Transfer Technique: stand pivot  [place], Shower Transfer: supervision  Verbal Cues: safety  Assistive Device: grab bars/tub rail;tub bench;walker, front-wheeled  Comment: via amb level c RW      Self Care  Self-Performance: obtains clothes;threads left arm, bra/undershirt;threads right arm, bra/undershirt;fastens bra;pulls bra/undershirt over head/around back;pulls bra/undershirt down/adjusts;threads left arm, shirt;threads right arm, shirt;pulls shirt over head/around back;pulls shirt down/adjusts  Adaptive Equipment: none  Comment: pt. completes UB dressing sitting up in bed c Cl S  Tasks: don;underwear;pants/bottoms;socks  Self-Performance: threads left leg, underpants;threads right leg, underpants;pulls underpants up or down;threads left leg, pants/shorts;threads right leg, pants/shorts;pulls pants/shorts up or down;dons/doffs left sock;dons/doffs right sock  [place] Assistance: adaptive equipment setup  Adaptive Equipment: sock aid  [place]: modified independence  Comment: Pants NT 2* staff asked OT not to fully dress for LB US this am.  Pts feet c edema preventing shoe don.  Doffed B socks and donned L sock on bath bench using GB to support self anteriorly to reach feet.   Self-Performance: chest;left arm;right arm;abdomen;front perineal area;buttocks;right upper leg;left upper leg;left lower leg,  including foot;right lower leg, including foot  Adaptive Equipment: grab bar/tub rail;hand-held shower spray hose;tub bench;long-handled sponge  Setup Assistance: obtain supplies;adaptive equipment setup  Comment: completed full wet shower in barrier free shower stall c tub bench and grab bars.  [place]: modified independence  Adaptive Equipment: accessible height toilet;grab bar/safety frame  Setup Assistance: adaptive equipment setup  Comment: pt continent bladder. Discussed potential use of commode over toilet for support @ dc, pt in rental.   Self-Performance: washes, rinses and dries face;washes, rinses and dries hands;brushes/elena hair;oral care (brushing teeth, cleaning dentures)  Adaptive Equipment: none  Setup Assistance: obtain supplies  [place]: modified independence  Comment: face, hands in context of shower.  Sat for deodorant and hair care edge of bench.  [place]: independent  Comment: completed breakfast tray post ADL per Pt request.    Cognition  Orientation Status (Cognition): oriented x 3  Cognitive Function: memory deficit  Comment, Short Term Memory: BIMS: 15/15, 3/3 immediate recall, 3/3 orientation, 3/3 delayed recall  Comment, Cognition: Pt reported tearful during PT session due to pain. Pt reporting appreication for change of scenery and diversional task    Communication       Frequency of Treatment (OT): 60-90 minutes per day, 5-7 times per week  Frequency of Treatment (PT): 5-7 times per week, other (see comments) ( minutes per day)    Weekly Outcome Summaries:  Weekly Progress Summary (PT)  Progress Toward Functional Goals (PT): progressing toward functional goals as expected  Weekly Outcome Statement: Pt is a 64 yo female with history of Arthritis, Asthma, Depression, GERD (gastroesophageal reflux disease), Hypothyroidism, and Lipid disorder. Principal problem is gait dysfunction secondary to bilateral TKA. Pt PLOF was independent with elevations, ambulation and  "transfers and all other IADLs with no AD.  Pt has been medically cleared for comprehensive IP at [facility].  Pt is CL S to roll L and R and supine to sit and CL S for sit <> supine, sit <> stand and SPT, CL S for ambulation of 200ft with RW.  Pt is limited by impaired balance, decreased strength, and impaired functional activity tolerance.  Pt will benefit from skilled comprehensive IP rehab 5-7 x per week for 60-90 min per day to address the above impairments and maximize functional ability.  Anticipate home exercise program, FT and AD to be provided to patient.  Goals established and POC initiated.   Impairments Continuing to Limit Function: decreased strength, decreased ROM, pain  Recommendations (PT): complete elevations with lateral approach next session  Weekly Outcome Summary (Interdisciplinary)  Weekly Outcome Statement: Seen for initial eval. Lili (Claudia) was awake and alert; she reported fatigue secondary to poor sleep at night (has trouble getting comfortable). She reported that her goals are to \"get back to dancing and walking [place] and [place].\" Appears optimistic and motivated. By hx, she graduated from [employer]. 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 She has been working remotely with [employer]. Her adult son lives with her and she has another son and daughter. She is . Not in a current relationship. She has a history of anxiety and depression. She has a long standing relationship with an OP counselor/LCSW, with whom she only treats now on an as needed basis. She started counseling many years ago related to depresion/coping with her daughter's medical problems. SHe is prescribed psychotropic medications by her PCP. No substance abuse hx. Currently, She reported feeling \"excited\" about the surgery and getting \"Back to my life.\" She appears to have appopriate insight and expectations. Feels pain is relatively well controlled " "(trouble sleeping). Psychoeducation on acute rehab and role of psychology provided.    Problem Resolution:  IRF Key Information  Additional Info Related to Length of Stay and Coverage: nrd 4/16Bladder Management: toileting schedule based on voiding pattern  Strategies/Techniques (Bowel Management): did not move bowels  RETS20 Counseling (Coping Strategies): active listening utilized   Identified Problems & Impairments: household mobility impairment, self care performance impairment (04/15/21 1118)  Comment, Identified Problems & Impairments: (not recorded)  Resolved Problems and Impairments: (not recorded)  Comment, Resolved Problems & Impairments: (not recorded) Team Weekly Outcome Statement: pain under control-tramadol/tylenol, cont b&b, skin intact and incisions healing (04/15/21 1118)Facilitators for Discharge: family support       Goals/Support System:  Patient/Family Goals  Patient's Goals For Discharge: other (see comments) (\"I want to go dancing again.\")  Family/Support System  Caregiver Engagement: active learner related to care delivery    IRF PT Goals      Most Recent Value   Bed Mobility Goal 1   Activity/Assistive Device  rolling to left, rolling to right, sit to supine, supine to sit at 04/10/2021 0833   [place]  modified independence at 04/10/2021 0833   Time Frame  short-term goal (STG), 5 - 7 days at 04/15/2021 0800   Progress/Outcome  good progress toward goal, goal revised this date at 04/15/2021 0800   Bed Mobility Goal 2   Activity/Assistive Device  rolling to left, rolling to right, sit to supine, supine to sit at 04/10/2021 0833   [place]  modified independence at 04/10/2021 0833   Time Frame  long-term goal (LTG), 2 weeks [STG = LTG] at 04/10/2021 0833   Progress/Outcome  goal ongoing at 04/15/2021 0800   Transfer Goal 1   Activity/Assistive Device  sit-to-stand/stand-to-sit, stand pivot, car transfer, walker, front-wheeled at 04/10/2021 1135   [place]  supervision required, " verbal cues required at 04/10/2021 1135   Time Frame  short-term goal (STG), 1 week at 04/10/2021 1135   Progress/Outcome  goal met at 04/15/2021 0800   Transfer Goal 2   Activity/Assistive Device  sit-to-stand/stand-to-sit, car transfer, walker, front-wheeled at 04/10/2021 1135   [place]  modified independence at 04/10/2021 1135   Time Frame  long-term goal (LTG), 2 weeks at 04/10/2021 1135   Progress/Outcome  goal ongoing at 04/15/2021 0800   Gait/Walking Locomotion Goal 1   Activity/Assistive Device  gait (walking locomotion), assistive device use at 04/10/2021 1135   Distance  200 feet at 04/10/2021 1135   [place]  supervision required, verbal cues required at 04/10/2021 1135   Time Frame  short-term goal (STG), 1 week at 04/10/2021 1135   Progress/Outcome  goal met at 04/15/2021 0800   Gait/Walking Locomotion Goal 2   Activity/Assistive Device  gait (walking locomotion), assistive device use at 04/10/2021 1135   Distance  200 feet at 04/10/2021 1135   [place]  modified independence at 04/10/2021 1135   Time Frame  short-term goal (STG), 2 weeks at 04/10/2021 1135   Progress/Outcome  goal ongoing at 04/15/2021 0800   Stairs Goal 1   Activity/Assistive Device  ascending stairs, descending stairs, using handrail, left, cane, straight at 04/10/2021 0833   Number of Stairs  8 at 04/10/2021 0833   [place]  supervision required at 04/10/2021 0833   Time Frame  short-term goal (STG), 5 - 7 days at 04/15/2021 0800   Progress/Outcome  goal ongoing, goal revised this date at 04/15/2021 0800   Stairs Goal 2   Activity/Assistive Device  ascending stairs, descending stairs, using handrail, left, cane, straight at 04/10/2021 0833   Number of Stairs  12 at 04/10/2021 0833   [place]  modified independence at 04/10/2021 0833   Time Frame  long-term goal (LTG), 2 weeks at 04/10/2021 0833   Progress/Outcome  goal ongoing at 04/15/2021 0800        IRF OT Goals      Most Recent Value   Transfer Goal 1    Activity/Assistive Device  toilet at 04/10/2021 0725   [place]  supervision required [cls] at 04/10/2021 0725   Time Frame  short-term goal (STG), 5 - 7 days at 04/10/2021 0725   Transfer Goal 2   Activity/Assistive Device  toilet at 04/10/2021 0725   [place]  modified independence at 04/10/2021 0725   Time Frame  long-term goal (LTG), 2 weeks at 04/10/2021 0725   Transfer Goal 3   Activity/Assistive Device  shower at 04/10/2021 0725   Time Frame  short-term goal (STG), 5 - 7 days at 04/10/2021 0725   Strategies/Barriers  TBA at 04/10/2021 0725   Transfer Goal 4   Activity/Assistive Device  shower at 04/10/2021 0725   Time Frame  long-term goal (LTG), 2 weeks at 04/10/2021 0725   Bathing Goal 1   Activity/Assistive Device  bathing skills, all at 04/10/2021 0725   [place]  minimum assist (75% or more patient effort) at 04/10/2021 0725   Time Frame  short-term goal (STG), 5 - 7 days at 04/10/2021 0725   Bathing Goal 2   Activity/Assistive Device  bathing skills, all at 04/10/2021 0725   [place]  supervision required at 04/10/2021 0725   Time Frame  long-term goal (LTG), 2 weeks at 04/10/2021 0725   UB Dressing Goal 1   Activity/Assistive Device  upper body dressing at 04/10/2021 0725   [place]  supervision required at 04/10/2021 0725   Time Frame  short-term goal (STG), 5 - 7 days at 04/10/2021 0725   UB Dressing Goal 2   Activity/Assistive Device  upper body dressing at 04/10/2021 0725   [place]  modified independence at 04/10/2021 0725   Time Frame  long-term goal (LTG), 2 weeks at 04/10/2021 0725   LB Dressing Goal 1   Activity/Assistive Device  lower body dressing at 04/10/2021 0725   [place]  moderate assist (50-74% patient effort) at 04/10/2021 0725   Time Frame  short-term goal (STG), 5 - 7 days at 04/10/2021 0725   LB Dressing Goal 2   Activity/Assistive Device  lower body dressing at 04/10/2021 0725   [place]  supervision required at 04/10/2021 0725   Time Frame   long-term goal (LTG), 2 weeks at 04/10/2021 0725   Grooming Goal 1   Activity/Assistive Device  grooming skills, all at 04/10/2021 0725   [place]  supervision required at 04/10/2021 0725   Time Frame  short-term goal (STG), 5 - 7 days at 04/10/2021 0725   Grooming Goal 2   Activity/Assistive Device  grooming skills, all at 04/10/2021 0725   [place]  modified independence at 04/10/2021 0725   Time Frame  long-term goal (LTG), 2 weeks at 04/10/2021 0725   Toileting Goal 1   Activity/Assistive Device  toileting skills, all at 04/10/2021 0725   [place]  supervision required [Cl S] at 04/10/2021 0725   Time Frame  short-term goal (STG), 5 - 7 days at 04/10/2021 0725   Toileting Goal 2   Activity/Assistive Device  toileting skills, all at 04/10/2021 0725   [place]  modified independence at 04/10/2021 0725   Time Frame  long-term goal (LTG), 5 - 7 days at 04/10/2021 0725          Risk for Complications  Constipation: Low  DVT: Low  Falls: Low  Infection: Low      The patient's medical prognosis is good to achieve the stated goals below.    Expected Level of Function  Expected Functional Improvement: self-care;safety;mobility  Self-Care: Independent  Sphincter Control: Independent  Transfers: Independent  Locomotion: Independent  Communication: Independent  Social Cognition: Independent       Discharge Planning:  Anticipated Discharge Disposition: home with home health services  Type of Home Care Services: home OT;home PT;nursing  Type of Outpatient Services: physical therapy    Equipment/Device Needs at Discharge  Assistive Device/Animal Currently Used at Home: other (see comments) (OT provided DME recs)  Temporary Housing Plan  Discharge Transition, Temporary Housing Plan: none needed  Discharge Planning  Does the patient need discharge transport arranged?: No  Additional Info Related to Length of Stay and Coverage: nrd 4/16    Anticipated Discharge Date: 4/17/2021    Needs Identified:         Team  Members Present     Rehab Attending Present:  Elisha Yan MD    Care Coordinator Present:  Bita Mccoy LSW    Nurse Present:  Vaelrie Schaeffer, RN    OT Present:  Anayeli Lamb OT    PT Present:  Evens Barnes PT        Next Team Conference Date: 04/15/21

## 2021-04-15 NOTE — DISCHARGE INSTR - ACTIVITY
Occupational Therapy     Toilet and Shower Transfers: Claudia performs at an independent level with safety considerations with rolling walker to and from toilet and shower.    Upper and Lower Body Dressing: Claudia performs at an independent level with safety considerations with rolling walker for upper and lower body dressing tasks. Continue to use sock aide for assistance.    Bathing: Claudia performs at an independent level with safety considerations for bathing. Continue to use long handled sponge in addition shower chair or tub bench.    Toileting: Claudia performs at an independent level with safety considerations for toileting with rolling walker.    Grooming: Claudia performs at an independent level with safety considerations for grooming in stance at sink.    Household Mobility/Household Activity: Claudia performs at an independent level with safety considerations for household mobility with rolling walker.    Driving: Driving is unsafe and not recommended at this time. Consult your physician for approval before resuming driving.     DME: Recommending a shower chair or tub bench for bathing. Please refer to packet for examples and locations.    Entered by: Anayeli Martinez OT on 4/15/2021      Additional    Home Exercise Program: Continue to complete upper body exercises with green theraband. Follow packet.    Physical Therapy:      Bed mobility: independent for rolling left and right and supine to sit and sit to supine    Transfers: independent for sit to stand, stand pivot transfers with rolling walker    Ambulation: independent for 200ft with rolling walker     Elevations: independent for ascending and descending laterally with unilateral handrail for 12 x 6in steps    Other: home exercise program and rolling walker provided to patient    Entered by: Evens Barnes PT on: 4/16/2021        Additional    Weight-Bearing Status: weight bearing as tolerated    Precautions: increased risk for  falls    Braces/Prosthesis: n/a    Durable Medical Equipment: rolling walker provided to pt    Home Exercise Program: provided to patient

## 2021-04-15 NOTE — PLAN OF CARE
Problem: Rehabilitation (IRF) Plan of Care  Goal: Plan of Care Review  Flowsheets (Taken 4/15/2021 1130)  Progress: improving  Plan of Care Reviewed With: patient  Outcome Summary: spoke with pt to give team report. she supports goals, plan and elos of 4/17 with recs for hc. pt wants to use to [facility]. no trng required. she will have her son pick her up sat am.   no current issues. cm emailed referal to [facility]. cm to follow. hc referal received by Negar CARRILLO

## 2021-04-15 NOTE — PROGRESS NOTES
History of present illness:      Patient is a 65-year-old female with history of bilateral knee pain due to DJD.  Pain not responding to conservative management.  Patient was admitted to [facility] and underwent bilateral knee arthroplasty by Dr. Youngblood on 4/6/21.  Postoperatively he was started on aspirin 325 g twice daily for DVT prevention.  Per records patient does not tolerate Percocet currently on tramadol for pain control.  Patient was allowed weightbearing castrated bilateral lower extremity.     Patient was evaluated by physical medicine rehabilitation deemed to be a good candidate for acute rehabilitation.  Patient now transferred to [facility] also for comprehensive acute inpatient rehabitation admitted on April 9, 2021.     Scheduled Meds:  • acetaminophen  650 mg oral q6h GILL   • amLODIPine  10 mg oral Nightly   • aspirin  325 mg oral BID with meals   • bisacodyL  10 mg rectal Daily (7p)   • buPROPion XL  300 mg oral Daily   • cetirizine  5 mg oral Nightly   • cholecalciferol (vitamin D3)  1,000 Units oral Daily with dinner   • docusate sodium  100 mg oral BID   • levothyroxine  25 mcg oral Daily (6:30a)   • lidocaine  2 patch Topical Daily   • pantoprazole  20 mg oral Daily before breakfast   • polyethylene glycol  17 g oral Daily   • rosuvastatin  10 mg oral Daily (6p)   • senna  3 tablet oral Daily with lunch   • sertraline  200 mg oral Daily     Continuous Infusions:  PRN Meds:.•  acetaminophen  •  albuterol HFA  •  alum-mag hydroxide-simeth  •  carboxymethylcellulose  •  cyclobenzaprine  •  diphenhydrAMINE  •  magnesium hydroxide  •  traMADoL  •  trimethobenzamide     Lab Results   Component Value Date    WBC 8.25 04/10/2021    HGB 10.0 (L) 04/10/2021    HCT 30.0 (L) 04/10/2021    MCV 94.3 04/10/2021     04/10/2021       Lab Results   Component Value Date    GLUCOSE 112 (H) 04/10/2021    CALCIUM 8.4 (L) 04/10/2021     04/10/2021    K 3.7 04/10/2021    CO2 30  "04/10/2021     04/10/2021    BUN 14 04/10/2021    CREATININE 0.5 (L) 04/10/2021     Subjective: Patient seen and examined no chest pain or shortness of breath,  Doing well, uneventful night.    Team 3/15 :    Nursing : continent B&B, skin intact    PT : transfers /amb CS, steps touching assist RW    OT : Mod I in the room    Physical examination:     His examination today showed a 65-year-old female no acute distress.  Patient awake alert oriented x3.     Blood pressure (!) 141/70, pulse 86, temperature 36.8 °C (98.2 °F), temperature source Oral, resp. rate 16, height 1.588 m (5' 2.5\"), weight 102 kg (224 lb 4.8 oz), SpO2 94 %, not currently breastfeeding.       Head normocephalic, sclera nonicteric, mucous brains moist.     Neck supple     Heart regular S1-S2     Lungs respiration nonlabored     GI examination was benign.     Musculoskeletal exam: Passive range of motion within functional limit bilateral upper extremity.    Patient with good bilateral knee range of motion, patient with good quad function bilaterally.  There was no evidence of calf tenderness or foot drop.     Neuro exam: Mental status as above patient able to obey simple commands.  Cranial nerve examination shows face symmetric, tongue midline, ocular motility intact and visual fields are full.  Motor examination bilateral upper extremity 5/5.  Examination of bilateral lower extremity exam was suboptimal due to recent bilateral knee surgery there was hip and ankle 5/5.  Sensory examination grossly normal.  Deep tendon reflexes were symmetrical bilateral upper extremity.  Gait not tested at this time.      Bilateral knee incisions dry clean and intact covered with zip shape dressing, no drainage or erythema both incisions look excellent covered by Telfa/tape    Impression:     Ambulatory ADL dysfunction due to:     DJD status post bilateral knee arthroplasty, patient is weightbearing as tolerated bilateral lower extremity.  Patient on aspirin " for DVT prevention.  Patient on tramadol for pain control.     Postoperative anemia     History of hypertension on Norvasc     Hypothyroidism on Synthroid     History of depression on Wellbutrin and Zoloft     Hyperlipidemia on Crestor     GERD on Protonix     History of asthma on albuterol HFA as needed    Morbid obesity, followed by dietition        Plan:     Patient will continue physical therapy and Occupational Therapy.  We will consult Dr. Orozco for medical management, patient also be followed by Dr. Bonilla psychiatry.  Patient be followed by psychology and case management for support.  Patient will receive 24-hour application nursing for bowel and bladder management, pain monitoring and education.  Rehabitation precautions include cardiac and for precautions.  Will monitor routine labs in addition we will monitor healing of bilateral knee incision healing closely.     Goals: Improve overall function for transfers, ambulation, ADLs and elevation.     Extent of length of stay  4/17     Discussed case with Dr. Orozco medical consultant, patient and nursing     This patient note has been dictated using speech recognition software.  Inadvertent speech recognition errors should be disregarded. Please do not hesitate to call my office for clarification.     Discussed with patient, Dr. Orozco medical consultant and nursing.     This patient note has been dictated using speech recognition software.  Inadvertent speech recognition errors should be disregarded. Please do not hesitate to call my office for clarification.

## 2021-04-15 NOTE — PROGRESS NOTES
[facility] Internal Medicine Progress Note          Patient was seen and examined at bedside.    Subjective:     Stable, no new complaints, her PT/OT is progressing well   Objective   Vital signs in last 24 hours:  Temp:  [36.8 °C (98.2 °F)-37 °C (98.6 °F)] 36.8 °C (98.2 °F)  Heart Rate:  [77-86] 86  Resp:  [16-18] 16  BP: (127-156)/(62-89) 141/70    No intake or output data in the 24 hours ending 04/15/21 1204  Intake/Output this shift:  No intake/output data recorded.   Review of Systems:  All other systems reviewed and negative except as noted in the HPI.   Objective      Labs  reviewed his labs thoroughly   Lab Results   Component Value Date    WBC 8.25 04/10/2021    HGB 10.0 (L) 04/10/2021    HCT 30.0 (L) 04/10/2021    MCV 94.3 04/10/2021     04/10/2021     Lab Results   Component Value Date    GLUCOSE 112 (H) 04/10/2021    CALCIUM 8.4 (L) 04/10/2021     04/10/2021    K 3.7 04/10/2021    CO2 30 04/10/2021     04/10/2021    BUN 14 04/10/2021    CREATININE 0.5 (L) 04/10/2021       Imaging  OSH imaging study reports reviewed       Full Code    Physical Exam:  Head/Ear/Nose/Throat: normocephalic; atraumatic; moisture mouth mm, no oropharyngeal thrush noted.   Eyes: anicteric sclera, EOMI; PERRL.   Neck : supple, no JVD, no carotid bruits appeciated.   Respiratory: no evidence of labored breathing, lung sounds CTA b/l, good aeration bibasilar area, no w/r/c.   Cardiovascular: RRR; normal S1, S2; no m/r/g; no S3 or S4.   Gastrointestinal: soft; NT; BS normal; mildly distended; no CVAT b/l.   Genitourinary: no graham.   Extremities : s/p b/l TKA, incisional site with dressing, focal c/d/i .   Neurological: AO x 3, fluent speeches, following commands, CNS II-XII grossly intact; no focal neurologic deficits.   Behavior/Emotional: in NAD, appropriate; cooperative.   Skin: clean, dry and intact.     Plan of care was discussed with patient, RN, and PMR attending     Assessment   CC: VIOLETA/l knee  severe DJD, failed conservative treatments, s/p b/l TKA, ambulatory dysfunction.     65 y.o.female, with PMH of obesity, HTN, HLD, hypothyroidism, anxiety/depression, GERD, seasonal allergy, h/o asthma, who has advanced b/l knee DJD, failed multiple outpatient conservative treatments, associated with progressively worsening b/l knee pain and difficulty with her ambulation, admitted to [facility] on 4/6/21 and underwent an elective b/l TKA . w/o procedure related complications, transferred to [facility] on 4/9/21 for post op inpatient acute rehab to address her ambulatory dysfunction. Her post op course was not complicated, post op acute pain , on prn tramadol, she seemed not tolerating oxycodone well, post op anemia but no need PRBCm H/H stable, on  mg bid for weeks for post op b/l LE DVT prophylaxis.     1. advanced b/l knee DJD, failed conservative treatments, progressively worsening b/l knee pain with ambulatory dysfunction,  s/p elective B/L TKA : inpt rehab, pain management, DVT prophylaxis, surgical site wound care/dermal defense, f/u with orthopedic as scheduled.     2. post op acute pain: pain management with titration based on regular pain scale evaluation, provide topical Lidoderm patch ,  ice packs, prn muscle relaxant.     3. post op blood loss anemia: f/u H/H, no need PRBC based on current H/H level, po iron with Vit C.     4. GI-constipation, GERD: provide constipation bowel regimen, po hydration, fiber intake, timed toilet visits.  PPI for GERD.     5. DVT prophy: SCD, TEDs, early ambulation, po ecotrin per ortho, check LE venous DUS to r/o DVT.       6. HTN, Dyslipidemia: on amlodipine and statin. Orthostatic hypotension prevention.     7. -urinary retention: timed voiding, monitor PVR with cic, check UA and UCX.     8. Hypothyroidism: cont home dose of levothyroxine.     9. Anxiety, depression: cont wellbutrin and zoloft, consult psychiatrist and psychologist CBT.     Billing code:  [id number]  Diagnoses:  Patient Active Problem List   Diagnosis   • Osteoarthritis of knees, bilateral   • Status post total bilateral knee replacement   • HTN (hypertension)   • Hypercholesterolemia   • Hypothyroidism   • Depression   • Asthma   • Seasonal allergies   • GERD (gastroesophageal reflux disease)   • Obesity   • Status post bilateral knee replacements   • Leg swelling      Aj Orozco MD  4/15/2021

## 2021-04-15 NOTE — PROGRESS NOTES
Patient: Lili Serrato  Location: [facility] [address]  MRN: [id number]  Today's date: 4/15/2021    History of Present Illness  Lili is a 65 y.o. female admitted on 4/9/2021 with Status post bilateral knee replacements [Z96.653]. Principal problem is Status post bilateral knee replacements. Pt with hx of bilateral knee pain due to DJD.  Pain did not respond to conservative management.  Pt admitted to [facility] and underwent bilateral knee arthroplasty by Dr. Youngblood.  Patient was allowed weightbearing as tolerated bilateral lower extremity.      Past Medical History  Lili has a past medical history of Arthritis, Asthma, Depression, GERD (gastroesophageal reflux disease), Hypothyroidism, and Lipid disorder.      PT Vitals    Date/Time Pulse HR Source Pt Activity O2 Therapy BP BP Location BP Method Pt Position [facility]   04/15/21 1401 78 Monitor At rest None (Room air) 141/67 Left upper arm Automatic Sitting BK      PT Pain    Date/Time Pain Type Pref Pain Scale Side Orientation Location Rating: Rest Onset/Duration [facility]   04/15/21 1401 Pain Assessment number (Numeric Rating Pain Scale) bilateral knee 7 7 position adjusted BK   04/15/21 1458 Pain Reassessment number (Numeric Rating Pain Scale) bilateral knee 5 -- cold applied BK          Prior Living Environment      Most Recent Value   People in Home  child(gm), adult   Living Arrangements  house   Location, Patient Bedroom  second floor, must negotiate stairs to access   Location, Bathroom  second floor, must negotiate stairs to access   Bathroom Access Comment  Pt. reports tub shower, no grab bars or DME          Prior Level of Function      Most Recent Value   Dominant Hand  right   Ambulation  independent   Transferring  independent   Toileting  independent   Bathing  independent   Dressing  independent   Assistive Device/Animal Currently Used at Home  other (see comments) [OT provided DME recs]          IRF PT Evaluation and  Treatment - 04/15/21 1401        PT Time Calculation    Start Time  1400     Stop Time  1500     Time Calculation (min)  60 min        Session Details    Document Type  daily treatment/progress note     Mode of Treatment  physical therapy;individual therapy        General Information    Patient Profile Reviewed  yes     General Observations of Patient  Pt alert and awake, in NAD. Pt reports 7/10 pain in B knees when seated at rest and with activity. Pt reports 5/10 pain in B knees post ice to posterior aspect of B knees. Pt agreeable to therapy session.     Existing Precautions/Restrictions  fall        Range of Motion (ROM)    Left Lower Extremity (ROM)  knee     Knee, Left (ROM)  Extension: 0 degrees; Flexion: 0-108 degrees     Right Lower Extremity (ROM)  knee     Knee, Right (ROM)  Extension: -4 degrees; Flexion: 4-104 degrees        Bed Mobility    [place], Supine to Sit  close supervision;verbal cues     Verbal Cues (Supine to Sit)  safety     [place], Sit to Supine  close supervision;verbal cues     Verbal Cues (Sit to Supine)  safety     Comment (Bed Mobility)  Pt performed bed mobility on therapy mat. Pt performed sit to supine by lowering her trunk posterior and to her L while lifting her B LEs onto the surface of the mat. Verbal cues for improved use of momentum of trunk lowering to lift B LEs onto the surface of the mat. Pt performed supine to sit by lifting B LEs off the edge of the mat while lifting her trunk anterior and to her R. Verbal cues for improved use of weight of trunk to control descent of B LEs.        Sit to Stand Transfer    [place], Sit to Stand Transfer  supervision;verbal cues     Verbal Cues  technique     Assistive Device  walker, front-wheeled     Comment  Verbal cues for increased upright posture.        Stand to Sit Transfer    [place], Stand to Sit Transfer  supervision;verbal cues     Verbal Cues  technique     Assistive Device  walker, front-wheeled      "Comment  Verbal cues for improved controlled lowering of pelvis.        Stand Pivot Transfer    [place], Stand Pivot/Stand Step Transfer  supervision;verbal cues     Verbal Cues  technique     Assistive Device  walker, front-wheeled     Comment  Verbal cues for improved proximity to seat surface prior to sitting.        Gait Training    [place], Gait  supervision;verbal cues     Assistive Device  walker, front-wheeled     Distance in Feet  150 feet     Pattern (Gait)  step-through     Deviations/Abnormal Patterns (Gait)  trey decreased;step length decreased;stride length decreased     Bilateral Gait Deviations  heel strike decreased     Comment (Gait/Stairs)  150' x 1, 25' x 1 with RW. Verbal cues for increased upright posture.        10 Meter Walk Test (Self-Selected Velocity)    Trial One: Ten Meter Walk Test (sec)  11.5 seconds     Trial Two: Ten Meter Walk Test (sec)  11.42 seconds     Trial Three: Ten Meter Walk Test (sec)  11.61 seconds     Trial One: Gait Speed (m/s)  0.52 m/s     Trial Two: Gait Speed (m/s)  0.53 m/s     Trial Three: Gait Speed (m/s)  0.52 m/s     Average Gait Speed (m/s): Three Trials  0.52 m/s        Lower Extremity (Therapeutic Exercise)    Exercise Position/Type  seated;supine     Comment  Seated B LE exercises: Marching x 30, LAQ x 15, AP x 30. Supine B LE exercises: GS 10\" x 10, QS 10\" x 10, SAQ 2 x 15, Heel slides with stretch strap 5\" hold x 10, Bridging over bolster 5\" hold 2 x 10.         Modality Treatment    Treatment Location 1 (Modality)  B knees     Modality Performed  cryotherapy     Modality Treatment Results  Decrease in pain.     Comment, Modality Treatment  Ice packs to posterior aspect of B knees x 10'.        Daily Progress Summary (PT)    Progress Toward Functional Goals (PT)  progressing toward functional goals as expected     Daily Outcome Statement (PT)  Pt with improving ROM in B knee flexion and extension. Pt requires minimal cueing post setup. Pt's " self-selected gait speed is 0.52 m/s which is below her age / gender related norms demonstrating she is at a high risk for falls.                     IRF PT Goals      Most Recent Value   Bed Mobility Goal 1   Activity/Assistive Device  rolling to left, rolling to right, sit to supine, supine to sit at 04/10/2021 0833   [place]  modified independence at 04/10/2021 0833   Time Frame  short-term goal (STG), 5 - 7 days at 04/15/2021 0800   Progress/Outcome  good progress toward goal, goal revised this date at 04/15/2021 0800   Bed Mobility Goal 2   Activity/Assistive Device  rolling to left, rolling to right, sit to supine, supine to sit at 04/10/2021 0833   [place]  modified independence at 04/10/2021 0833   Time Frame  long-term goal (LTG), 2 weeks [STG = LTG] at 04/10/2021 0833   Progress/Outcome  goal ongoing at 04/15/2021 0800   Transfer Goal 1   Activity/Assistive Device  sit-to-stand/stand-to-sit, stand pivot, car transfer, walker, front-wheeled at 04/10/2021 1135   [place]  supervision required, verbal cues required at 04/10/2021 1135   Time Frame  short-term goal (STG), 1 week at 04/10/2021 1135   Progress/Outcome  goal met at 04/15/2021 0800   Transfer Goal 2   Activity/Assistive Device  sit-to-stand/stand-to-sit, car transfer, walker, front-wheeled at 04/10/2021 1135   [place]  modified independence at 04/10/2021 1135   Time Frame  long-term goal (LTG), 2 weeks at 04/10/2021 1135   Progress/Outcome  goal ongoing at 04/15/2021 0800   Gait/Walking Locomotion Goal 1   Activity/Assistive Device  gait (walking locomotion), assistive device use at 04/10/2021 1135   Distance  200 feet at 04/10/2021 1135   [place]  supervision required, verbal cues required at 04/10/2021 1135   Time Frame  short-term goal (STG), 1 week at 04/10/2021 1135   Progress/Outcome  goal met at 04/15/2021 0800   Gait/Walking Locomotion Goal 2   Activity/Assistive Device  gait (walking locomotion), assistive device  use at 04/10/2021 1135   Distance  200 feet at 04/10/2021 1135   [place]  modified independence at 04/10/2021 1135   Time Frame  short-term goal (STG), 2 weeks at 04/10/2021 1135   Progress/Outcome  goal ongoing at 04/15/2021 0800   Stairs Goal 1   Activity/Assistive Device  ascending stairs, descending stairs, using handrail, left, cane, straight at 04/10/2021 0833   Number of Stairs  8 at 04/10/2021 0833   [place]  supervision required at 04/10/2021 0833   Time Frame  short-term goal (STG), 5 - 7 days at 04/15/2021 0800   Progress/Outcome  goal ongoing, goal revised this date at 04/15/2021 0800   Stairs Goal 2   Activity/Assistive Device  ascending stairs, descending stairs, using handrail, left, cane, straight at 04/10/2021 0833   Number of Stairs  12 at 04/10/2021 0833   [place]  modified independence at 04/10/2021 0833   Time Frame  long-term goal (LTG), 2 weeks at 04/10/2021 0833   Progress/Outcome  goal ongoing at 04/15/2021 0800

## 2021-04-15 NOTE — PROGRESS NOTES
Patient: Lili Serrato  Location: [facility] [address]  MRN: [id number]  Today's date: 4/15/2021    History of Present Illness  Lili is a 65 y.o. female admitted on 4/9/2021 with Status post bilateral knee replacements [Z96.653]. Principal problem is Status post bilateral knee replacements. Pt with hx of bilateral knee pain due to DJD.  Pain did not respond to conservative management.  Pt admitted to [facility] and underwent bilateral knee arthroplasty by Dr. Youngblood.  Patient was allowed weightbearing as tolerated bilateral lower extremity.      Past Medical History  Lili has a past medical history of Arthritis, Asthma, Depression, GERD (gastroesophageal reflux disease), Hypothyroidism, and Lipid disorder.      PT Vitals    Date/Time Pulse HR Source BP BP Location BP Method Pt Position [facility]   04/15/21 0934 86 Monitor 133/76 Right upper arm Automatic Sitting [facility]   04/15/21 1024 86 Monitor 141/70 Right upper arm Automatic Sitting [facility]      PT Pain    Date/Time Pain Type Pref Pain Scale Side Orientation Location Rating: Activity Rating: Activity Onset/Duration [facility]   04/15/21 0934 Pain Assessment number (Numeric Rating Pain Scale) right knee 3 -- -- premedicated for activity [facility]   04/15/21 0945 Pain Reassessment number (Numeric Rating Pain Scale) -- knee -- 4 - moderate pain -- -- [facility]   04/15/21 1002 Pain Assessment number (Numeric Rating Pain Scale) -- knee 3 -- 0-->normal -- [facility]   04/15/21 1024 Pain Reassessment number (Numeric Rating Pain Scale) -- knee 3 -- -- -- [facility]          Prior Living Environment      Most Recent Value   People in Home  child(gm), adult   Living Arrangements  house   Location, Patient Bedroom  second floor, must negotiate stairs to access   Location, Bathroom  second floor, must negotiate stairs to access   Bathroom Access Comment  Pt. reports tub shower, no grab bars or DME          Prior Level of Function      Most Recent Value   Dominant Hand  right    Ambulation  independent   Transferring  independent   Toileting  independent   Bathing  independent   Dressing  independent   Assistive Device/Animal Currently Used at Home  other (see comments) [OT provided DME recs]          IRF PT Evaluation and Treatment - 04/15/21 0934        PT Time Calculation    Start Time  0930     Stop Time  1030     Time Calculation (min)  60 min        Session Details    Document Type  daily treatment/progress note     Mode of Treatment  physical therapy;individual therapy        Transfers    Transfers  stand pivot transfer;car transfer        Sit to Stand Transfer    [place], Sit to Stand Transfer  supervision     Verbal Cues  safety     Assistive Device  walker, front-wheeled     Comment  S for safety from WC to RW        Stand to Sit Transfer    [place], Stand to Sit Transfer  supervision     Verbal Cues  safety     Assistive Device  walker, front-wheeled     Comment  to WC with S for safety        Stand Pivot Transfer    [place], Stand Pivot/Stand Step Transfer  supervision     Verbal Cues  safety     Assistive Device  walker, front-wheeled     Comment  via amb approach to WC with RW, S for safety        Car Transfer    Transfer Technique  stand pivot     [place], Car Transfer  close supervision     Verbal Cues  safety;technique     Assistive Device  walker, front-wheeled     Comment  via amb approach to WC with RW, CLS for safety. Cues for hand placement.         Gait Training    [place], Gait  close supervision     Assistive Device  walker, front-wheeled     Distance in Feet  200 feet     Pattern (Gait)  step-through     Deviations/Abnormal Patterns (Gait)  trey decreased;gait speed decreased     Bilateral Gait Deviations  heel strike decreased     Comment (Gait/Stairs)  Pt amb 200'x1 with RW, CLS for safety. RW leveled by adjusted back legs and tennis balls replaced.         Curb Negotiation    [place]  touching/steadying assist     Assistive  "Device  walker, front-wheeled     Curb Height  6 inches     Comment  up/down 6\" + 2\" curb step with RW, CL S/ TOuching A for balance and safety, cuing for RW safety with turns        Sloped Surface Gait Skills    [place]  touching/steadying assist     Assistive Device  walker, front-wheeled     Distance in Feet  5 feet     Comment  up/down 5' indoor ramp with RW, CLS / Touching A for safety and balance correction        Stairs Training    [place], Stairs  touching/steadying assist     Assistive Device  railing     Handrail Location (Stairs)  left side (ascending);right side (descending)     Number of Stairs  12     Stair Height  6 inches     Ascending Stairs Technique  step-to-step     Descending Stairs Technique  step-to-step     Comment  touching A / CL S for negotiating 12 6\" steps with unilateral railing        Lower Extremity (Therapeutic Exercise)    Exercise Position/Type  seated;AROM (active range of motion)     General Exercise  bilateral;ankle pumps;LAQ (long arc quad)     Reps and Sets  Seated Warmups LAQ, Marching, APs x30        Daily Progress Summary (PT)    Daily Outcome Statement (PT)  Pt participated well with functional mobility training with decreased pain levels compared to reported pain the previous day. She was grossly Touching A with elevations and S/CLS for transfers and gait. Cont POC as able.                     IRF PT Goals      Most Recent Value   Bed Mobility Goal 1   Activity/Assistive Device  rolling to left, rolling to right, sit to supine, supine to sit at 04/10/2021 0833   [place]  modified independence at 04/10/2021 0833   Time Frame  short-term goal (STG), 5 - 7 days at 04/15/2021 0800   Progress/Outcome  good progress toward goal, goal revised this date at 04/15/2021 0800   Bed Mobility Goal 2   Activity/Assistive Device  rolling to left, rolling to right, sit to supine, supine to sit at 04/10/2021 0833   [place]  modified independence at 04/10/2021 0833 "   Time Frame  long-term goal (LTG), 2 weeks [STG = LTG] at 04/10/2021 0833   Progress/Outcome  goal ongoing at 04/15/2021 0800   Transfer Goal 1   Activity/Assistive Device  sit-to-stand/stand-to-sit, stand pivot, car transfer, walker, front-wheeled at 04/10/2021 1135   [place]  supervision required, verbal cues required at 04/10/2021 1135   Time Frame  short-term goal (STG), 1 week at 04/10/2021 1135   Progress/Outcome  goal met at 04/15/2021 0800   Transfer Goal 2   Activity/Assistive Device  sit-to-stand/stand-to-sit, car transfer, walker, front-wheeled at 04/10/2021 1135   [place]  modified independence at 04/10/2021 1135   Time Frame  long-term goal (LTG), 2 weeks at 04/10/2021 1135   Progress/Outcome  goal ongoing at 04/15/2021 0800   Gait/Walking Locomotion Goal 1   Activity/Assistive Device  gait (walking locomotion), assistive device use at 04/10/2021 1135   Distance  200 feet at 04/10/2021 1135   [place]  supervision required, verbal cues required at 04/10/2021 1135   Time Frame  short-term goal (STG), 1 week at 04/10/2021 1135   Progress/Outcome  goal met at 04/15/2021 0800   Gait/Walking Locomotion Goal 2   Activity/Assistive Device  gait (walking locomotion), assistive device use at 04/10/2021 1135   Distance  200 feet at 04/10/2021 1135   [place]  modified independence at 04/10/2021 1135   Time Frame  short-term goal (STG), 2 weeks at 04/10/2021 1135   Progress/Outcome  goal ongoing at 04/15/2021 0800   Stairs Goal 1   Activity/Assistive Device  ascending stairs, descending stairs, using handrail, left, cane, straight at 04/10/2021 0833   Number of Stairs  8 at 04/10/2021 0833   [place]  supervision required at 04/10/2021 0833   Time Frame  short-term goal (STG), 5 - 7 days at 04/15/2021 0800   Progress/Outcome  goal ongoing, goal revised this date at 04/15/2021 0800   Stairs Goal 2   Activity/Assistive Device  ascending stairs, descending stairs, using handrail, left, cane,  straight at 04/10/2021 0833   Number of Stairs  12 at 04/10/2021 0833   [place]  modified independence at 04/10/2021 0833   Time Frame  long-term goal (LTG), 2 weeks at 04/10/2021 0833   Progress/Outcome  goal ongoing at 04/15/2021 0800

## 2021-04-15 NOTE — PROGRESS NOTES
Psychiatry Progress Note    History of Present Illness   See initial consult.  History pertaining to psychosis, depression and anxiety and other psychiatric and psychologic conditions as well as general medical history detailed in initial consult.      Interval History:     More engaged in discussing adjustment.  Continues to sleep better.  Mood improving.  Anxiety and despondency in context of medical conditions manageable.   Sodium stable.  Vital signs stable.  Nursing reports no behavioral concerns.  No sundowning agitation.  Continues to make progress with respect to gaining insight into emotional state and medical conditions and the relationship between them.  Participatory in therapies  Feeling better.  Affect brighter.  Sodium okay at 142 and vital signs stable.  Hemoglobin low at 10.0    MENTAL STATUS EXAM  Appearance: well groomed  Gait and Motor: no abnormal movements  Speech: normal rate/rhythm/volume  Mood: depressed and anxious  Affect: constricted  Associations: coherent  Thought Process: goal-directed  Thought Content: no auditory or visual hallucinations.  Suicidality/Homicidality: denies  Judgement/Insight: minimizes severity level of illness  Orientation: situation  Memory: knows current president  Attention: alert  Knowledge: normal  Language: normal      Vital Signs for the last 24 hours:  Temp:  [36.8 °C (98.2 °F)-37 °C (98.6 °F)] 36.8 °C (98.2 °F)  Heart Rate:  [77-84] 81  Resp:  [16-18] 16  BP: (125-156)/(62-89) 127/62    Labs:  Labs below reviewed for pertinence to psychiatric condition  Lab Results   Component Value Date    GLUCOSE 112 (H) 04/10/2021    CALCIUM 8.4 (L) 04/10/2021     04/10/2021    K 3.7 04/10/2021    CO2 30 04/10/2021     04/10/2021    BUN 14 04/10/2021    CREATININE 0.5 (L) 04/10/2021     Lab Results   Component Value Date    WBC 8.25 04/10/2021    HGB 10.0 (L) 04/10/2021    HCT 30.0 (L) 04/10/2021    MCV 94.3 04/10/2021     04/10/2021     Pain Management  Panel    There is no flowsheet data to display.           Current Facility-Administered Medications   Medication Dose Route Frequency Provider Last Rate Last Admin   • acetaminophen (TYLENOL) tablet 650 mg  650 mg oral q4h PRN Aj Orozco MD       • acetaminophen (TYLENOL) tablet 650 mg  650 mg oral q6h GILL Aj Orozco MD   650 mg at 04/15/21 0548   • albuterol HFA (VENTOLIN HFA) 90 mcg/actuation inhaler 2 puff  2 puff inhalation q6h PRN Aj Orozco MD       • alum-mag hydroxide-simeth (MAALOX) 200-200-20 mg/5 mL suspension 30 mL  30 mL oral q4h PRN Aj Orozco MD       • amLODIPine (NORVASC) tablet 10 mg  10 mg oral Nightly Aj Orozco MD   10 mg at 04/14/21 2101   • aspirin EC tablet 325 mg  325 mg oral BID with meals Aj Orozco MD   325 mg at 04/15/21 0739   • bisacodyL (DULCOLAX) 10 mg suppository 10 mg  10 mg rectal Daily (7p) Aj Orozco MD       • buPROPion XL (WELLBUTRIN XL) 24 hr ER tablet 300 mg  300 mg oral Daily Aj Orozco MD   300 mg at 04/15/21 0739   • carboxymethylcellulose (REFRESH PLUS) 0.5 % ophthalmic dropperette 1 drop  1 drop Both Eyes 3x daily PRN Aj Orozco MD       • cetirizine (ZyrTEC) tablet 5 mg  5 mg oral Nightly Aj Orozco MD   5 mg at 04/14/21 2102   • cholecalciferol (vitamin D3) tablet 1,000 Units  1,000 Units oral Daily with dinner Aj Orozco MD   1,000 Units at 04/14/21 1640   • cyclobenzaprine (FLEXERIL) tablet 10 mg  10 mg oral 3x daily PRN Aj Orozco MD       • diphenhydrAMINE (BENADRYL) capsule 25 mg  25 mg oral q6h PRN Aj Orozco MD       • docusate sodium (COLACE) capsule 100 mg  100 mg oral BID Aj Orozco MD   100 mg at 04/15/21 0740   • levothyroxine (SYNTHROID) tablet 25 mcg  25 mcg oral Daily (6:30a) Aj Orozco MD   25 mcg at 04/15/21 0548   • lidocaine (ASPERCREME) 4 % topical patch 2 patch  2 patch Topical Daily Aj Orozco MD   2 patch at 04/11/21 0801   • magnesium hydroxide (M.O.M.) 400 mg/5 mL suspension 30 mL   30 mL oral 2x daily PRN Aj Orozco MD       • pantoprazole (PROTONIX) tablet,delayed release (DR/EC) 20 mg  20 mg oral Daily before breakfast Aj Orozco MD   20 mg at 04/15/21 0739   • polyethylene glycol (MIRALAX) 17 gram packet 17 g  17 g oral Daily Aj Orozco MD   17 g at 04/09/21 1819   • rosuvastatin (CRESTOR) tablet 10 mg  10 mg oral Daily (6p) Aj Orozco MD   10 mg at 04/14/21 1755   • senna (SENOKOT) tablet 3 tablet  3 tablet oral Daily with lunch Aj Orozco MD   3 tablet at 04/14/21 1236   • sertraline (ZOLOFT) tablet 200 mg  200 mg oral Daily Aj Orozco MD   200 mg at 04/15/21 0739   • traMADoL (ULTRAM) tablet 50 mg  50 mg oral q4h PRN Aj Orozco MD   50 mg at 04/15/21 0548   • trimethobenzamide (TIGAN) capsule 300 mg  300 mg oral 3x daily PRN Aj Orozco MD            Patient Active Problem List   Diagnosis   • Osteoarthritis of knees, bilateral   • Status post total bilateral knee replacement   • HTN (hypertension)   • Hypercholesterolemia   • Hypothyroidism   • Depression   • Asthma   • Seasonal allergies   • GERD (gastroesophageal reflux disease)   • Obesity   • Status post bilateral knee replacements   • Leg swelling           Assessment/Plan    Depression: CBT automatic anxious and negative thoughts  Adjustment Disorder to Disability: supportive therapy  Sleep:  Insight into illness: insight therapy/psychoeducation  Psychotherapy: supportive  Monitor: Mood, Speech, Appetite, Energy, Cognition, Behavioral, Impulsivity, Agitation  Family Support  Medications: monitor for side effects  - presently no gi, akathesia , ha or sedation  sertraline, 200 mg, oral, Daily  Last sodium 142  wellbutrin xl 300  Sodium 142 - monitor on psych meds  Monitor medications for side effects  Educate about dosing   okay to continue current medication with improvement in mood and tolerated well  Continue to monitor for any side effects from medications.  No GI side effect of akathisia headache  or sedation noted  Christopher Bonilla MD  4/15/2021

## 2021-04-15 NOTE — PLAN OF CARE
Plan of Care Review  Plan of Care Reviewed With: patient  Progress: improving  Outcome Summary: Patient AAOx3, continent of bowel and bladder, Incisions are LEILANI.  Pain managed with tylenol and tramadol.  All meds and care reviewed with patient.

## 2021-04-16 ENCOUNTER — APPOINTMENT (INPATIENT)
Dept: PHYSICAL THERAPY | Facility: REHABILITATION | Age: 66
DRG: 560 | End: 2021-04-16
Payer: COMMERCIAL

## 2021-04-16 ENCOUNTER — APPOINTMENT (INPATIENT)
Dept: OCCUPATIONAL THERAPY | Facility: REHABILITATION | Age: 66
DRG: 560 | End: 2021-04-16
Payer: COMMERCIAL

## 2021-04-16 PROCEDURE — 97110 THERAPEUTIC EXERCISES: CPT | Mod: GP

## 2021-04-16 PROCEDURE — 12800000 HC ROOM AND CARE SEMIPRIVATE REHAB

## 2021-04-16 PROCEDURE — 97116 GAIT TRAINING THERAPY: CPT | Mod: GP

## 2021-04-16 PROCEDURE — 63700000 HC SELF-ADMINISTRABLE DRUG: Performed by: INTERNAL MEDICINE

## 2021-04-16 PROCEDURE — 97530 THERAPEUTIC ACTIVITIES: CPT | Mod: GP

## 2021-04-16 PROCEDURE — 97010 HOT OR COLD PACKS THERAPY: CPT | Mod: GP

## 2021-04-16 PROCEDURE — 97530 THERAPEUTIC ACTIVITIES: CPT | Mod: GO

## 2021-04-16 RX ORDER — TRAMADOL HYDROCHLORIDE 50 MG/1
50 TABLET ORAL EVERY 4 HOURS PRN
Qty: 30 TABLET | Refills: 0 | Status: SHIPPED | OUTPATIENT
Start: 2021-04-16 | End: 2021-04-21

## 2021-04-16 RX ORDER — AMLODIPINE BESYLATE 10 MG/1
10 TABLET ORAL DAILY
Qty: 30 TABLET | Refills: 0 | Status: SHIPPED | OUTPATIENT
Start: 2021-04-16 | End: 2021-05-16

## 2021-04-16 RX ORDER — ROSUVASTATIN CALCIUM 10 MG/1
10 TABLET, COATED ORAL DAILY
Qty: 30 TABLET | Refills: 0 | Status: SHIPPED | OUTPATIENT
Start: 2021-04-16 | End: 2021-05-16

## 2021-04-16 RX ORDER — SENNOSIDES 8.6 MG/1
2 TABLET ORAL
Qty: 60 TABLET | Refills: 0 | COMMUNITY
Start: 2021-04-16 | End: 2021-05-16

## 2021-04-16 RX ORDER — DOCUSATE SODIUM 100 MG/1
100 CAPSULE, LIQUID FILLED ORAL 2 TIMES DAILY
Qty: 60 CAPSULE | Refills: 0 | COMMUNITY
Start: 2021-04-16 | End: 2021-05-16

## 2021-04-16 RX ORDER — ASPIRIN 325 MG
325 TABLET, DELAYED RELEASE (ENTERIC COATED) ORAL 2 TIMES DAILY WITH MEALS
Qty: 43 TABLET | Refills: 0 | Status: SHIPPED | OUTPATIENT
Start: 2021-04-16 | End: 2021-05-08

## 2021-04-16 RX ORDER — LEVOTHYROXINE SODIUM 25 UG/1
25 TABLET ORAL
Qty: 30 TABLET | Refills: 0 | Status: SHIPPED | OUTPATIENT
Start: 2021-04-16 | End: 2021-05-16

## 2021-04-16 RX ORDER — ACETAMINOPHEN 325 MG/1
650 TABLET ORAL EVERY 6 HOURS PRN
COMMUNITY
Start: 2021-04-16 | End: 2021-05-16

## 2021-04-16 RX ADMIN — ACETAMINOPHEN 650 MG: 325 TABLET, FILM COATED ORAL at 12:28

## 2021-04-16 RX ADMIN — PANTOPRAZOLE SODIUM 20 MG: 20 TABLET, DELAYED RELEASE ORAL at 07:06

## 2021-04-16 RX ADMIN — ACETAMINOPHEN 650 MG: 325 TABLET, FILM COATED ORAL at 16:46

## 2021-04-16 RX ADMIN — DOCUSATE SODIUM 100 MG: 100 CAPSULE, LIQUID FILLED ORAL at 21:09

## 2021-04-16 RX ADMIN — ACETAMINOPHEN 650 MG: 325 TABLET, FILM COATED ORAL at 05:57

## 2021-04-16 RX ADMIN — LEVOTHYROXINE SODIUM 25 MCG: 25 TABLET ORAL at 05:57

## 2021-04-16 RX ADMIN — TRAMADOL HYDROCHLORIDE 50 MG: 50 TABLET, COATED ORAL at 07:06

## 2021-04-16 RX ADMIN — TRAMADOL HYDROCHLORIDE 50 MG: 50 TABLET, COATED ORAL at 21:14

## 2021-04-16 RX ADMIN — BUPROPION HYDROCHLORIDE 300 MG: 150 TABLET, FILM COATED, EXTENDED RELEASE ORAL at 07:48

## 2021-04-16 RX ADMIN — SERTRALINE 200 MG: 50 TABLET, FILM COATED ORAL at 07:48

## 2021-04-16 RX ADMIN — ROSUVASTATIN CALCIUM 10 MG: 5 TABLET, FILM COATED ORAL at 16:45

## 2021-04-16 RX ADMIN — ASPIRIN 325 MG: 325 TABLET, COATED ORAL at 07:48

## 2021-04-16 RX ADMIN — ASPIRIN 325 MG: 325 TABLET, COATED ORAL at 16:46

## 2021-04-16 RX ADMIN — Medication 1000 UNITS: at 16:46

## 2021-04-16 RX ADMIN — DOCUSATE SODIUM 100 MG: 100 CAPSULE, LIQUID FILLED ORAL at 07:48

## 2021-04-16 RX ADMIN — TRAMADOL HYDROCHLORIDE 50 MG: 50 TABLET, COATED ORAL at 16:47

## 2021-04-16 RX ADMIN — TRAMADOL HYDROCHLORIDE 50 MG: 50 TABLET, COATED ORAL at 12:30

## 2021-04-16 NOTE — PLAN OF CARE
Problem: Rehabilitation (IRF) Plan of Care  Goal: Plan of Care Review  Flowsheets (Taken 4/16/2021 1021)  Plan of Care Reviewed With: patient  Outcome Summary: met with pt to go over dc info and process. pt has secured a ride home in am. no current issues. support given. hc set up.

## 2021-04-16 NOTE — PROGRESS NOTES
Patient: Lili Serrato  Location: [facility] [address]  MRN: [id number]  Today's date: 4/16/2021    History of Present Illness  Lili is a 65 y.o. female admitted on 4/9/2021 with Status post bilateral knee replacements [Z96.653]. Principal problem is Status post bilateral knee replacements. Pt with hx of bilateral knee pain due to DJD.  Pain did not respond to conservative management.  Pt admitted to [facility] and underwent bilateral knee arthroplasty by Dr. Youngblood.  Patient was allowed weightbearing as tolerated bilateral lower extremity.      Past Medical History  Lili has a past medical history of Arthritis, Asthma, Depression, GERD (gastroesophageal reflux disease), Hypothyroidism, and Lipid disorder.      PT Vitals    Date/Time Pulse HR Source BP BP Location BP Method Pt Position Who   04/16/21 1306 82 Monitor 129/77 Right upper arm Automatic Sitting DT      PT Pain    Date/Time Pain Type Pref Pain Scale Side Orientation Location Onset/Duration Who   04/16/21 1306 Pain Assessment number (Numeric Rating Pain Scale) bilateral knee 5 diversional activity provided DT   04/16/21 1317 Pain Assessment number (Numeric Rating Pain Scale) bilateral knee 5 diversional activity provided DT          Prior Living Environment      Most Recent Value   People in Home  child(gm), adult   Living Arrangements  house   Location, Patient Bedroom  second floor, must negotiate stairs to access   Location, Bathroom  second floor, must negotiate stairs to access   Bathroom Access Comment  Pt. reports tub shower, no grab bars or DME          Prior Level of Function      Most Recent Value   Dominant Hand  right   Ambulation  independent   Transferring  independent   Toileting  independent   Bathing  independent   Dressing  independent   Assistive Device/Animal Currently Used at Home  other (see comments) [OT provided DME recs]          IRF PT Evaluation and Treatment - 04/16/21 1306        PT  Time Calculation    Start Time  1300     Stop Time  1330     Time Calculation (min)  30 min        Session Details    Document Type  discharge evaluation     Mode of Treatment  individual therapy;physical therapy        General Information    Patient Profile Reviewed  yes     General Observations of Patient  received in therapy gym, NAD        Transfers    Transfers  stand pivot transfer        Sit to Stand Transfer    [place], Sit to Stand Transfer  modified independence     Assistive Device  walker, front-wheeled        Stand to Sit Transfer    [place], Stand to Sit Transfer  modified independence     Assistive Device  walker, front-wheeled        Stand Pivot Transfer    [place], Stand Pivot/Stand Step Transfer  modified independence     Assistive Device  walker, front-wheeled        Gait Training    [place], Gait  modified independence     Assistive Device  walker, front-wheeled     Distance in Feet  200 feet     Pattern (Gait)  step-through     Deviations/Abnormal Patterns (Gait)  trey decreased     Bilateral Gait Deviations  heel strike decreased     Comment (Gait/Stairs)  + with RW at [place]        Rough/Uneven Surface Gait Skills    [place]  modified independence     Assistive Device  walker, front-wheeled     Distance in Feet  150 feet     Comment  over carpet and carpet tile threshold        Knee (Therapeutic Exercise)    Comment (Knee Therapeutic Exercise)  seated: LAQs 2x10 with 2 second hold        Ankle (Therapeutic Exercise)    Comment (Ankle Therapeutic Exercise)  seated APs 2x10        Daily Progress Summary (PT)    Progress Toward Functional Goals (PT)  progressing toward functional goals as expected     Daily Outcome Statement (PT)  Pt [place] with ambulation and transfers with RW and is safe to D/c to home with home care tomorrow.                     IRF PT Goals      Most Recent Value   Bed Mobility Goal 1   Activity/Assistive Device  rolling to left, rolling to right, sit  to supine, supine to sit at 04/10/2021 0833   [place]  modified independence at 04/10/2021 0833   Time Frame  short-term goal (STG), 5 - 7 days at 04/15/2021 0800   Progress/Outcome  goal met at 04/16/2021 0802   Bed Mobility Goal 2   Activity/Assistive Device  rolling to left, rolling to right, sit to supine, supine to sit at 04/10/2021 0833   [place]  modified independence at 04/10/2021 0833   Time Frame  long-term goal (LTG), 2 weeks [STG = LTG] at 04/10/2021 0833   Progress/Outcome  goal met at 04/16/2021 0802   Transfer Goal 1   Activity/Assistive Device  sit-to-stand/stand-to-sit, stand pivot, car transfer, walker, front-wheeled at 04/10/2021 1135   [place]  supervision required, verbal cues required at 04/10/2021 1135   Time Frame  short-term goal (STG), 1 week at 04/10/2021 1135   Progress/Outcome  goal met at 04/16/2021 0802   Transfer Goal 2   Activity/Assistive Device  sit-to-stand/stand-to-sit, car transfer, walker, front-wheeled at 04/10/2021 1135   [place]  modified independence at 04/10/2021 1135   Time Frame  long-term goal (LTG), 2 weeks at 04/10/2021 1135   Progress/Outcome  goal met at 04/16/2021 0802   Gait/Walking Locomotion Goal 1   Activity/Assistive Device  gait (walking locomotion), assistive device use at 04/10/2021 1135   Distance  200 feet at 04/10/2021 1135   [place]  supervision required, verbal cues required at 04/10/2021 1135   Time Frame  short-term goal (STG), 1 week at 04/10/2021 1135   Progress/Outcome  goal met at 04/16/2021 0802   Gait/Walking Locomotion Goal 2   Activity/Assistive Device  gait (walking locomotion), assistive device use at 04/10/2021 1135   Distance  200 feet at 04/10/2021 1135   [place]  modified independence at 04/10/2021 1135   Time Frame  short-term goal (STG), 2 weeks at 04/10/2021 1135   Progress/Outcome  goal met at 04/16/2021 0802   Stairs Goal 1   Activity/Assistive Device  ascending stairs, descending stairs, using  handrail, left, cane, straight at 04/10/2021 0833   Number of Stairs  8 at 04/10/2021 0833   [place]  supervision required at 04/10/2021 0833   Time Frame  short-term goal (STG), 5 - 7 days at 04/15/2021 0800   Progress/Outcome  goal met at 04/16/2021 0802   Stairs Goal 2   Activity/Assistive Device  ascending stairs, descending stairs, using handrail, left, cane, straight at 04/10/2021 0833   Number of Stairs  12 at 04/10/2021 0833   [place]  modified independence at 04/10/2021 0833   Time Frame  long-term goal (LTG), 2 weeks at 04/10/2021 0833   Progress/Outcome  goal met at 04/16/2021 0802

## 2021-04-16 NOTE — PROGRESS NOTES
Patient: Lili Serrato  Location: [facility] [address]  MRN: [id number]  Today's date: 4/16/2021    History of Present Illness  Lili is a 65 y.o. female admitted on 4/9/2021 with Status post bilateral knee replacements [Z96.653]. Principal problem is Status post bilateral knee replacements. Pt with hx of bilateral knee pain due to DJD.  Pain did not respond to conservative management.  Pt admitted to [facility] and underwent bilateral knee arthroplasty by Dr. Youngblood.  Patient was allowed weightbearing as tolerated bilateral lower extremity.      Past Medical History  Lili has a past medical history of Arthritis, Asthma, Depression, GERD (gastroesophageal reflux disease), Hypothyroidism, and Lipid disorder.      OT Vitals    Date/Time Pulse HR Source BP BP Location BP Method Pt Position Who   04/16/21 1104 83 Monitor 115/64 Right upper arm Automatic Sitting GD      OT Pain    Date/Time Pain Type Pref Pain Scale Side Orientation Location Onset/Duration Who   04/16/21 1104 Pain Assessment number (Numeric Rating Pain Scale) bilateral knee 4 diversional activity provided GD          Prior Living Environment      Most Recent Value   People in Home  child(gm), adult   Living Arrangements  house   Location, Patient Bedroom  second floor, must negotiate stairs to access   Location, Bathroom  second floor, must negotiate stairs to access   Bathroom Access Comment  Pt. reports tub shower, no grab bars or DME          Prior Level of Function      Most Recent Value   Dominant Hand  right   Ambulation  independent   Transferring  independent   Toileting  independent   Bathing  independent   Dressing  independent   Assistive Device/Animal Currently Used at Home  other (see comments) [OT provided DME recs]          Occupational Profile      Most Recent Value   Occupational History/Life Experiences  Pt. works as a clinical trial coordinator, (+) , enjoys careersmore, journaling  "  Patient Goals  \"I want to be able to go out dancing\"          IRF OT Evaluation and Treatment - 04/16/21 1104        OT Time Calculation    Start Time  1100     Stop Time  1200     Time Calculation (min)  60 min        Session Details    Document Type  discharge evaluation     Mode of Treatment  occupational therapy;individual therapy        General Information    General Observations of Patient  received in gym. pleasant and agreeable.         Therapeutic Interventions    Comment, Therapeutic Intervention  completed fine motor coordination task at tabletop for diversional task for pain and utilize therapeutic use of self. increased time to complete. painted three small terracotta pots c good precision and detail in addition to squeezing paint and manipulating various paintbrushes.         Discharge Summary (OT)    Outcomes Achieved Upon Discharge (OT)  progress was made toward goals;all goals met within established timeframes     Discharge Summary Statement (OT)  64 yo female presented to [facility] on 4/9/2021 post bilateral knee replacement. Pt is now performing at Mod I level for transfers and ADL c rolling walker. educated and issued AE to improve performance c LB dressing. educated on DME recommendations and issued packet. recommending not returning to driving until physician clears Pt. Pt agreeable and receptive to education. d/c pending for home tomorrow                     IRF OT Goals      Most Recent Value   Transfer Goal 1   Activity/Assistive Device  toilet at 04/10/2021 0725   [place]  supervision required [cls] at 04/10/2021 0725   Time Frame  short-term goal (STG), 5 - 7 days at 04/10/2021 0725   Progress/Outcome  goal met at 04/16/2021 1104   Transfer Goal 2   Activity/Assistive Device  toilet at 04/10/2021 0725   [place]  modified independence at 04/10/2021 0725   Time Frame  long-term goal (LTG), 2 weeks at 04/10/2021 0725   Progress/Outcome  goal met at 04/16/2021 1104   Transfer Goal 3 "   Activity/Assistive Device  shower at 04/10/2021 0725   [place]  modified independence at 04/16/2021 1104   Time Frame  short-term goal (STG), 5 - 7 days at 04/10/2021 0725   Strategies/Barriers  TBA at 04/10/2021 0725   Progress/Outcome  goal met at 04/16/2021 1104   Transfer Goal 4   Activity/Assistive Device  shower at 04/10/2021 0725   [place]  modified independence at 04/16/2021 1104   Time Frame  long-term goal (LTG), 2 weeks at 04/10/2021 0725   Progress/Outcome  goal met at 04/16/2021 1104   Bathing Goal 1   Activity/Assistive Device  bathing skills, all at 04/10/2021 0725   [place]  minimum assist (75% or more patient effort) at 04/10/2021 0725   Time Frame  short-term goal (STG), 5 - 7 days at 04/10/2021 0725   Progress/Outcome  goal met at 04/16/2021 1104   Bathing Goal 2   Activity/Assistive Device  bathing skills, all at 04/10/2021 0725   [place]  supervision required at 04/10/2021 0725   Time Frame  long-term goal (LTG), 2 weeks at 04/10/2021 0725   Progress/Outcome  goal met at 04/16/2021 1104   UB Dressing Goal 1   Activity/Assistive Device  upper body dressing at 04/10/2021 0725   [place]  supervision required at 04/10/2021 0725   Time Frame  short-term goal (STG), 5 - 7 days at 04/10/2021 0725   Progress/Outcome  goal met at 04/16/2021 1104   UB Dressing Goal 2   Activity/Assistive Device  upper body dressing at 04/10/2021 0725   [place]  modified independence at 04/10/2021 0725   Time Frame  long-term goal (LTG), 2 weeks at 04/10/2021 0725   Progress/Outcome  goal met at 04/16/2021 1104   LB Dressing Goal 1   Activity/Assistive Device  lower body dressing at 04/10/2021 0725   [place]  moderate assist (50-74% patient effort) at 04/10/2021 0725   Time Frame  short-term goal (STG), 5 - 7 days at 04/10/2021 0725   Progress/Outcome  goal met at 04/16/2021 1104   LB Dressing Goal 2   Activity/Assistive Device  lower body dressing at 04/10/2021 0725   [place]   supervision required at 04/10/2021 0725   Time Frame  long-term goal (LTG), 2 weeks at 04/10/2021 0725   Progress/Outcome  goal met at 04/16/2021 1104   Grooming Goal 1   Activity/Assistive Device  grooming skills, all at 04/10/2021 0725   [place]  supervision required at 04/10/2021 0725   Time Frame  short-term goal (STG), 5 - 7 days at 04/10/2021 0725   Progress/Outcome  goal met at 04/16/2021 1104   Grooming Goal 2   Activity/Assistive Device  grooming skills, all at 04/10/2021 0725   [place]  modified independence at 04/10/2021 0725   Time Frame  long-term goal (LTG), 2 weeks at 04/10/2021 0725   Progress/Outcome  goal met at 04/16/2021 1104   Toileting Goal 1   Activity/Assistive Device  toileting skills, all at 04/10/2021 0725   [place]  supervision required [Cl S] at 04/10/2021 0725   Time Frame  short-term goal (STG), 5 - 7 days at 04/10/2021 0725   Progress/Outcome  goal met at 04/16/2021 1104   Toileting Goal 2   Activity/Assistive Device  toileting skills, all at 04/10/2021 0725   [place]  modified independence at 04/10/2021 0725   Time Frame  long-term goal (LTG), 5 - 7 days at 04/10/2021 0725   Progress/Outcome  goal met at 04/16/2021 1104

## 2021-04-16 NOTE — PLAN OF CARE
Plan of Care Review  Plan of Care Reviewed With: patient  Progress: improving  Outcome Summary: alert and oriented /3   continent of bowel and bladder.     pain managed using multimodal approach to control .     Incisions healing without evidence of infection.

## 2021-04-16 NOTE — PROGRESS NOTES
[facility] Internal Medicine Progress Note          Patient was seen and examined at bedside.    Subjective:   Clinically she remains stable, pleasant, in NAD; she will finish her inpt acute rehab with good functional recovery, plan to d/c home tomorrow, provided with no new complaints or concerns, no O/N acute events, reviewed her recent labs, benign,  spent 35 min to prepare for her d/c home tomorrow.    Objective   Vital signs in last 24 hours:  Temp:  [36.8 °C (98.2 °F)-37.1 °C (98.8 °F)] 36.8 °C (98.3 °F)  Heart Rate:  [78-91] 91  Resp:  [16-18] 18  BP: (131-141)/(67-86) 132/77    No intake or output data in the 24 hours ending 04/16/21 1019  Intake/Output this shift:  No intake/output data recorded.   Review of Systems:  All other systems reviewed and negative except as noted in the HPI.   Objective      Labs  reviewed his labs thoroughly   Lab Results   Component Value Date    WBC 8.25 04/10/2021    HGB 10.0 (L) 04/10/2021    HCT 30.0 (L) 04/10/2021    MCV 94.3 04/10/2021     04/10/2021     Lab Results   Component Value Date    GLUCOSE 112 (H) 04/10/2021    CALCIUM 8.4 (L) 04/10/2021     04/10/2021    K 3.7 04/10/2021    CO2 30 04/10/2021     04/10/2021    BUN 14 04/10/2021    CREATININE 0.5 (L) 04/10/2021       Imaging  OSH imaging study reports reviewed       Full Code    Physical Exam:  Head/Ear/Nose/Throat: normocephalic; atraumatic; moisture mouth mm, no oropharyngeal thrush noted.   Eyes: anicteric sclera, EOMI; PERRL.   Neck : supple, no JVD, no carotid bruits appeciated.   Respiratory: no evidence of labored breathing, lung sounds CTA b/l, good aeration bibasilar area, no w/r/c.   Cardiovascular: RRR; normal S1, S2; no m/r/g; no S3 or S4.   Gastrointestinal: soft; NT; BS normal; mildly distended; no CVAT b/l.   Genitourinary: no graham.   Extremities : s/p b/l TKA, incisional site with dressing, focal c/d/i .   Neurological: AO x 3, fluent speeches, following commands, CNS  II-XII grossly intact; no focal neurologic deficits.   Behavior/Emotional: in NAD, appropriate; cooperative.   Skin: clean, dry and intact.     Plan of care was discussed with patient, RN, and PMR attending     Assessment   CC: B/l knee severe DJD, failed conservative treatments, s/p b/l TKA, ambulatory dysfunction.     65 y.o.female, with PMH of obesity, HTN, HLD, hypothyroidism, anxiety/depression, GERD, seasonal allergy, h/o asthma, who has advanced b/l knee DJD, failed multiple outpatient conservative treatments, associated with progressively worsening b/l knee pain and difficulty with her ambulation, admitted to [facility] on 4/6/21 and underwent an elective b/l TKA . w/o procedure related complications, transferred to [facility] on 4/9/21 for post op inpatient acute rehab to address her ambulatory dysfunction. Her post op course was not complicated, post op acute pain , on prn tramadol, she seemed not tolerating oxycodone well, post op anemia but no need PRBCm H/H stable, on  mg bid for weeks for post op b/l LE DVT prophylaxis.     1. advanced b/l knee DJD, failed conservative treatments, progressively worsening b/l knee pain with ambulatory dysfunction,  s/p elective B/L TKA : inpt rehab, pain management, DVT prophylaxis, surgical site wound care/dermal defense, f/u with orthopedic as scheduled.     2. post op acute pain: pain management with titration based on regular pain scale evaluation, provide topical Lidoderm patch ,  ice packs, prn muscle relaxant.     3. post op blood loss anemia: f/u H/H, no need PRBC based on current H/H level, po iron with Vit C.     4. GI-constipation, GERD: provide constipation bowel regimen, po hydration, fiber intake, timed toilet visits.  PPI for GERD.     5. DVT prophy: SCD, TEDs, early ambulation, po ecotrin per ortho, check LE venous DUS to r/o DVT.       6. HTN, Dyslipidemia: on amlodipine and statin. Orthostatic hypotension prevention.     7. -urinary retention: timed  voiding, monitor PVR with cic, check UA and UCX.     8. Hypothyroidism: cont home dose of levothyroxine.     9. Anxiety, depression: cont wellbutrin and zoloft, consult psychiatrist and psychologist CBT.     Billing code: [id number]  Diagnoses:  Patient Active Problem List   Diagnosis   • Osteoarthritis of knees, bilateral   • Status post total bilateral knee replacement   • HTN (hypertension)   • Hypercholesterolemia   • Hypothyroidism   • Depression   • Asthma   • Seasonal allergies   • GERD (gastroesophageal reflux disease)   • Obesity   • Status post bilateral knee replacements   • Leg swelling      Aj Orozco MD  4/16/2021

## 2021-04-16 NOTE — PLAN OF CARE
Problem: Rehabilitation (IRF) Plan of Care  Goal: Plan of Care Review  Outcome: Progressing  Flowsheets (Taken 4/16/2021 0431)  Progress: improving  Plan of Care Reviewed With: patient  Outcome Summary: pt sleeps well over night. tramadol and tylenol for pain. walks to bathroom using rolling walker   Plan of Care Review  Plan of Care Reviewed With: patient  Progress: improving  Outcome Summary: pt sleeps well over night. tramadol and tylenol for pain. walks to bathroom using rolling walker

## 2021-04-16 NOTE — PROGRESS NOTES
Psychiatry Progress Note    History of Present Illness   See initial consult.  History pertaining to psychosis, depression and anxiety and other psychiatric and psychologic conditions as well as general medical history detailed in initial consult.      Interval History:   Tolerates Wellbutrin without over activation.  No GI side effect of akathisia headache or sedation.  Feels positively about her progress in therapies.  continued effort in therapy  no signs of metabolic disturbance - cognition stable  energy adequate for therapy  developing coping strategies for ATD and mood fluctuations  continues without any safety issues  responsive to supportive intervention  Discussed with nursing - no behavioral disturbance  More engaged in discussing adjustment.  Feeling better.  Affect brighter.  Sodium okay at 142 and vital signs stable.  Hemoglobin low at 10.0    MENTAL STATUS EXAM  Appearance: well groomed  Gait and Motor: no abnormal movements  Speech: normal rate/rhythm/volume  Mood: depressed and anxious  Affect: constricted  Associations: coherent  Thought Process: goal-directed  Thought Content: no auditory or visual hallucinations.  Suicidality/Homicidality: denies  Judgement/Insight: minimizes severity level of illness  Orientation: situation  Memory: knows current president  Attention: alert  Knowledge: normal  Language: normal      Vital Signs for the last 24 hours:  Temp:  [36.8 °C (98.2 °F)-37.1 °C (98.8 °F)] 36.8 °C (98.3 °F)  Heart Rate:  [78-91] 91  Resp:  [16-18] 18  BP: (131-141)/(67-86) 132/77    Labs:  Labs below reviewed for pertinence to psychiatric condition  Lab Results   Component Value Date    GLUCOSE 112 (H) 04/10/2021    CALCIUM 8.4 (L) 04/10/2021     04/10/2021    K 3.7 04/10/2021    CO2 30 04/10/2021     04/10/2021    BUN 14 04/10/2021    CREATININE 0.5 (L) 04/10/2021     Lab Results   Component Value Date    WBC 8.25 04/10/2021    HGB 10.0 (L) 04/10/2021    HCT 30.0 (L) 04/10/2021     MCV 94.3 04/10/2021     04/10/2021     Pain Management Panel    There is no flowsheet data to display.           Current Facility-Administered Medications   Medication Dose Route Frequency Provider Last Rate Last Admin   • acetaminophen (TYLENOL) tablet 650 mg  650 mg oral q4h PRN Aj Orzoco MD       • acetaminophen (TYLENOL) tablet 650 mg  650 mg oral q6h GILL Aj Orozco MD   650 mg at 04/16/21 0557   • albuterol HFA (VENTOLIN HFA) 90 mcg/actuation inhaler 2 puff  2 puff inhalation q6h PRN Aj Oroczo MD       • alum-mag hydroxide-simeth (MAALOX) 200-200-20 mg/5 mL suspension 30 mL  30 mL oral q4h PRN Aj Orozco MD       • amLODIPine (NORVASC) tablet 10 mg  10 mg oral Nightly Aj Orozco MD   10 mg at 04/15/21 2101   • aspirin EC tablet 325 mg  325 mg oral BID with meals Aj Orozco MD   325 mg at 04/16/21 0748   • bisacodyL (DULCOLAX) 10 mg suppository 10 mg  10 mg rectal Daily (7p) Aj Orozco MD       • buPROPion XL (WELLBUTRIN XL) 24 hr ER tablet 300 mg  300 mg oral Daily Aj Orozco MD   300 mg at 04/16/21 0748   • carboxymethylcellulose (REFRESH PLUS) 0.5 % ophthalmic dropperette 1 drop  1 drop Both Eyes 3x daily PRN Aj Orozco MD       • cetirizine (ZyrTEC) tablet 5 mg  5 mg oral Nightly Aj Orozco MD   5 mg at 04/15/21 2101   • cholecalciferol (vitamin D3) tablet 1,000 Units  1,000 Units oral Daily with dinner Aj Orozco MD   1,000 Units at 04/15/21 1706   • cyclobenzaprine (FLEXERIL) tablet 10 mg  10 mg oral 3x daily PRN Aj Orozco MD       • diphenhydrAMINE (BENADRYL) capsule 25 mg  25 mg oral q6h PRN Aj Orozco MD       • docusate sodium (COLACE) capsule 100 mg  100 mg oral BID Aj Orozco MD   100 mg at 04/16/21 0748   • levothyroxine (SYNTHROID) tablet 25 mcg  25 mcg oral Daily (6:30a) Aj Orozco MD   25 mcg at 04/16/21 0557   • lidocaine (ASPERCREME) 4 % topical patch 2 patch  2 patch Topical Daily Aj Orozco MD   2 patch at 04/11/21  0801   • magnesium hydroxide (M.O.M.) 400 mg/5 mL suspension 30 mL  30 mL oral 2x daily PRN Aj Orozco MD       • pantoprazole (PROTONIX) tablet,delayed release (DR/EC) 20 mg  20 mg oral Daily before breakfast Aj Orozco MD   20 mg at 04/16/21 0706   • polyethylene glycol (MIRALAX) 17 gram packet 17 g  17 g oral Daily Aj Orozco MD   17 g at 04/09/21 1819   • rosuvastatin (CRESTOR) tablet 10 mg  10 mg oral Daily (6p) Aj Orozco MD   10 mg at 04/15/21 1706   • senna (SENOKOT) tablet 3 tablet  3 tablet oral Daily with lunch Aj Orozco MD   3 tablet at 04/15/21 1210   • sertraline (ZOLOFT) tablet 200 mg  200 mg oral Daily Aj Orozco MD   200 mg at 04/16/21 0748   • traMADoL (ULTRAM) tablet 50 mg  50 mg oral q4h PRN Aj Orozco MD   50 mg at 04/16/21 0706   • trimethobenzamide (TIGAN) capsule 300 mg  300 mg oral 3x daily PRN Aj Orozco MD            Patient Active Problem List   Diagnosis   • Osteoarthritis of knees, bilateral   • Status post total bilateral knee replacement   • HTN (hypertension)   • Hypercholesterolemia   • Hypothyroidism   • Depression   • Asthma   • Seasonal allergies   • GERD (gastroesophageal reflux disease)   • Obesity   • Status post bilateral knee replacements   • Leg swelling           Assessment/Plan    Depression: CBT automatic anxious and negative thoughts  Adjustment Disorder to Disability: supportive therapy  Sleep:  Insight into illness: insight therapy/psychoeducation  Psychotherapy: supportive  Monitor: Mood, Speech, Appetite, Energy, Cognition, Behavioral, Impulsivity, Agitation  Family Support  Medications: monitor for side effects  - presently no gi, akathesia , ha or sedation  sertraline, 200 mg, oral, Daily  Last sodium 142  wellbutrin xl 300  Sodium 142 - monitor on psych meds  Monitor medications for side effects  Educate about dosing   okay to continue current medication with improvement in mood and tolerated well  Continue to monitor for any side  effects from medications.  No GI side effect of akathisia headache or sedation noted  Promote further insight into her condition and long-term coping strategies  Christopher Bonilla MD  4/16/2021

## 2021-04-16 NOTE — PROGRESS NOTES
History of present illness:      Patient is a 65-year-old female with history of bilateral knee pain due to DJD.  Pain not responding to conservative management.  Patient was admitted to [facility] and underwent bilateral knee arthroplasty by Dr. Youngblood on 4/6/21.  Postoperatively he was started on aspirin 325 g twice daily for DVT prevention.  Per records patient does not tolerate Percocet currently on tramadol for pain control.  Patient was allowed weightbearing castrated bilateral lower extremity.     Patient was evaluated by physical medicine rehabilitation deemed to be a good candidate for acute rehabilitation.  Patient now transferred to [facility] also for comprehensive acute inpatient rehabitation admitted on April 9, 2021.     Scheduled Meds:  • acetaminophen  650 mg oral q6h GILL   • amLODIPine  10 mg oral Nightly   • aspirin  325 mg oral BID with meals   • buPROPion XL  300 mg oral Daily   • cetirizine  5 mg oral Nightly   • cholecalciferol (vitamin D3)  1,000 Units oral Daily with dinner   • docusate sodium  100 mg oral BID   • levothyroxine  25 mcg oral Daily (6:30a)   • pantoprazole  20 mg oral Daily before breakfast   • rosuvastatin  10 mg oral Daily (6p)   • senna  3 tablet oral Daily with lunch   • sertraline  200 mg oral Daily     Continuous Infusions:  PRN Meds:.•  acetaminophen  •  albuterol HFA  •  alum-mag hydroxide-simeth  •  carboxymethylcellulose  •  cyclobenzaprine  •  diphenhydrAMINE  •  magnesium hydroxide  •  traMADoL  •  trimethobenzamide     Lab Results   Component Value Date    WBC 8.25 04/10/2021    HGB 10.0 (L) 04/10/2021    HCT 30.0 (L) 04/10/2021    MCV 94.3 04/10/2021     04/10/2021       Lab Results   Component Value Date    GLUCOSE 112 (H) 04/10/2021    CALCIUM 8.4 (L) 04/10/2021     04/10/2021    K 3.7 04/10/2021    CO2 30 04/10/2021     04/10/2021    BUN 14 04/10/2021    CREATININE 0.5 (L) 04/10/2021     Subjective: Patient seen and  "examined no chest pain or shortness of breath,  Doing well, patient is very happy with her stay at [facility] ready to be discharged home tomorrow, discussed with patient fall precautions and follow-ups.  Team 3/15 :    Nursing : continent B&B, skin intact    PT : transfers /amb CS, steps touching assist RW    OT : Mod I in the room    Physical examination:     His examination today showed a 65-year-old female no acute distress.  Patient awake alert oriented x3.     Blood pressure 133/70, pulse 80, temperature 36.8 °C (98.2 °F), temperature source Oral, resp. rate 18, height 1.588 m (5' 2.5\"), weight 102 kg (224 lb 4.8 oz), SpO2 95 %, not currently breastfeeding.       Head normocephalic, sclera nonicteric, mucous brains moist.     Neck supple     Heart regular S1-S2     Lungs respiration nonlabored     GI examination was benign.     Musculoskeletal exam: Passive range of motion within functional limit bilateral upper extremity.    Patient with good bilateral knee range of motion, patient with good quad function bilaterally.  There was no evidence of calf tenderness or foot drop.     Neuro exam: Mental status as above patient able to obey simple commands.  Cranial nerve examination shows face symmetric, tongue midline, ocular motility intact and visual fields are full.  Motor examination bilateral upper extremity 5/5.  Examination of bilateral lower extremity exam was suboptimal due to recent bilateral knee surgery there was hip and ankle 5/5.  Sensory examination grossly normal.  Deep tendon reflexes were symmetrical bilateral upper extremity.  Gait not tested at this time.      Bilateral knee incisions dry clean and intact covered with zip shape dressing, no drainage or erythema both incisions look excellent covered by Telfa/tape    Impression:     Ambulatory ADL dysfunction due to:     DJD status post bilateral knee arthroplasty, patient is weightbearing as tolerated bilateral lower extremity.  " Patient on aspirin for DVT prevention.  Patient on tramadol for pain control.     Postoperative anemia     History of hypertension on Norvasc     Hypothyroidism on Synthroid     History of depression on Wellbutrin and Zoloft     Hyperlipidemia on Crestor     GERD on Protonix     History of asthma on albuterol HFA as needed    Morbid obesity, followed by dietition        Plan:     Patient will continue physical therapy and Occupational Therapy.  We will consult Dr. Orozco for medical management, patient also be followed by Dr. Bonilla psychiatry.  Patient be followed by psychology and case management for support.  Patient will receive 24-hour application nursing for bowel and bladder management, pain monitoring and education.  Rehabitation precautions include cardiac and for precautions.  Will monitor routine labs in addition we will monitor healing of bilateral knee incision healing closely.     Goals: Improve overall function for transfers, ambulation, ADLs and elevation.     Extent of length of stay  4/17     Discussed case with Dr. Orozco medical consultant, patient and nursing     This patient note has been dictated using speech recognition software.  Inadvertent speech recognition errors should be disregarded. Please do not hesitate to call my office for clarification.     Discussed with patient, Dr. Orozco medical consultant and nursing.     This patient note has been dictated using speech recognition software.  Inadvertent speech recognition errors should be disregarded. Please do not hesitate to call my office for clarification.

## 2021-04-17 VITALS
BODY MASS INDEX: 39.74 KG/M2 | HEART RATE: 82 BPM | OXYGEN SATURATION: 95 % | HEIGHT: 63 IN | WEIGHT: 224.3 LBS | SYSTOLIC BLOOD PRESSURE: 112 MMHG | DIASTOLIC BLOOD PRESSURE: 58 MMHG | RESPIRATION RATE: 18 BRPM | TEMPERATURE: 98 F

## 2021-04-17 LAB — SARS-COV-2 RNA RESP QL NAA+PROBE: NEGATIVE

## 2021-04-17 PROCEDURE — 63700000 HC SELF-ADMINISTRABLE DRUG: Performed by: INTERNAL MEDICINE

## 2021-04-17 PROCEDURE — U0003 INFECTIOUS AGENT DETECTION BY NUCLEIC ACID (DNA OR RNA); SEVERE ACUTE RESPIRATORY SYNDROME CORONAVIRUS 2 (SARS-COV-2) (CORONAVIRUS DISEASE [COVID-19]), AMPLIFIED PROBE TECHNIQUE, MAKING USE OF HIGH THROUGHPUT TECHNOLOGIES AS DESCRIBED BY CMS-2020-01-R: HCPCS | Performed by: PHYSICAL MEDICINE & REHABILITATION

## 2021-04-17 RX ADMIN — BUPROPION HYDROCHLORIDE 300 MG: 150 TABLET, FILM COATED, EXTENDED RELEASE ORAL at 08:30

## 2021-04-17 RX ADMIN — ACETAMINOPHEN 650 MG: 325 TABLET, FILM COATED ORAL at 05:34

## 2021-04-17 RX ADMIN — DOCUSATE SODIUM 100 MG: 100 CAPSULE, LIQUID FILLED ORAL at 08:31

## 2021-04-17 RX ADMIN — TRAMADOL HYDROCHLORIDE 50 MG: 50 TABLET, COATED ORAL at 02:53

## 2021-04-17 RX ADMIN — LEVOTHYROXINE SODIUM 25 MCG: 25 TABLET ORAL at 05:35

## 2021-04-17 RX ADMIN — ASPIRIN 325 MG: 325 TABLET, COATED ORAL at 08:31

## 2021-04-17 RX ADMIN — TRAMADOL HYDROCHLORIDE 50 MG: 50 TABLET, COATED ORAL at 08:30

## 2021-04-17 RX ADMIN — SERTRALINE 200 MG: 50 TABLET, FILM COATED ORAL at 08:30

## 2021-04-17 RX ADMIN — ACETAMINOPHEN 650 MG: 325 TABLET, FILM COATED ORAL at 10:11

## 2021-04-17 RX ADMIN — PANTOPRAZOLE SODIUM 20 MG: 20 TABLET, DELAYED RELEASE ORAL at 08:30

## 2021-04-17 NOTE — PLAN OF CARE
Plan of Care Review  Plan of Care Reviewed With: patient  Progress: improving  Outcome Summary: AAOx4, continues to be continent B&B, SCD's in place. Presently sleeping in bed, no S/S distress or discomfort noted, call-bell within reach.

## 2021-04-17 NOTE — PLAN OF CARE
Plan of Care Review  Plan of Care Reviewed With: patient  Outcome Summary: D/c to home at this time. Discharge instructions reviewed with patient. Meds to be picked up at [facility] pharmacy. Belongings from room taken with patinet including RW.

## 2021-04-20 NOTE — DISCHARGE SUMMARY
Rehab Discharge Summary    BRIEF OVERVIEW  Admitting Provider: Elisha Yan MD  Discharge Provider: No att. providers found  Primary Care Physician at Discharge: Soo Manzo [phone]     Admission Date: 4/9/2021     Discharge Date: 4/17/2021    Primary Discharge Diagnosis  DJD status post bilateral knee arthroplasty, patient is weightbearing as tolerated bilateral lower extremity.  Patient on aspirin for DVT prevention.  Patient on tramadol for pain control    Secondary Discharge Diagnosis  Postoperative anemia     History of hypertension on Norvasc     Hypothyroidism on Synthroid     History of depression on Wellbutrin and Zoloft     Hyperlipidemia on Crestor     GERD on Protonix     History of asthma on albuterol HFA as needed     Morbid obesity, followed by dietition    Discharge Disposition  Home   Code Status at Discharge: Prior    Discharge Medications     Medication List      START taking these medications    acetaminophen 325 mg tablet  Commonly known as: TYLENOL  Take 2 tablets (650 mg total) by mouth every 6 (six) hours as needed for mild pain.  Dose: 650 mg     aspirin 325 mg EC tablet  Take 1 tablet (325 mg total) by mouth 2 (two) times a day with meals for 43 doses. Resume your ASA 81 mg po daily after finishing this one.  Dose: 325 mg  Replaces: aspirin 81 mg chewable tablet     docusate sodium 100 mg capsule  Commonly known as: COLACE  Take 1 capsule (100 mg total) by mouth 2 (two) times a day.  Dose: 100 mg     senna 8.6 mg tablet  Commonly known as: SENOKOT  Take 2 tablets by mouth daily with lunch.  Dose: 2 tablet     traMADoL 50 mg tablet  Commonly known as: ULTRAM  Take 1 tablet (50 mg total) by mouth every 4 (four) hours as needed for severe pain for up to 5 days.  Dose: 50 mg        CONTINUE taking these medications    amLODIPine 10 mg tablet  Commonly known as: NORVASC  Take 1 tablet (10 mg total) by mouth daily.  Dose: 10 mg     buPROPion  mg 24 hr tablet  Commonly  known as: WELLBUTRIN XL  Take 300 mg by mouth daily.  Dose: 300 mg     carboxymethylcellulose 0.5 % dropperette  Commonly known as: REFRESH PLUS  Administer 2 drops into both eyes daily as needed for dry eyes.  Dose: 2 drop     cholecalciferol (vitamin D3) 5,000 unit (125 mcg) tablet  Take 2,500 Units by mouth daily.  Dose: 2,500 Units     levothyroxine 25 mcg tablet  Commonly known as: SYNTHROID  Take 1 tablet (25 mcg total) by mouth daily.  Dose: 25 mcg     omeprazole 20 mg capsule  Commonly known as: PriLOSEC  Take 20 mg by mouth daily before breakfast.  Dose: 20 mg     rosuvastatin 10 mg tablet  Commonly known as: CRESTOR  Take 1 tablet (10 mg total) by mouth daily.  Dose: 10 mg     sertraline 100 mg tablet  Commonly known as: ZOLOFT  Take 200 mg by mouth daily.  Dose: 200 mg        STOP taking these medications    aspirin 81 mg chewable tablet  Replaced by: aspirin 325 mg EC tablet     loratadine 10 mg tablet  Commonly known as: CLARITIN            Lab Results   Component Value Date     WBC 8.25 04/10/2021     HGB 10.0 (L) 04/10/2021     HCT 30.0 (L) 04/10/2021     MCV 94.3 04/10/2021      04/10/2021         Lab Results   Component Value Date     GLUCOSE 112 (H) 04/10/2021     CALCIUM 8.4 (L) 04/10/2021      04/10/2021     K 3.7 04/10/2021     CO2 30 04/10/2021      04/10/2021     BUN 14 04/10/2021     CREATININE 0.5 (L) 04/10/2021       Follow-up Appointments Arranged: Yes               DETAILS OF HOSPITAL STAY    Presenting Problem/History of Present Illness  History of present illness:      Patient is a 65-year-old female with history of bilateral knee pain due to DJD.  Pain not responding to conservative management.  Patient was admitted to [facility] and underwent bilateral knee arthroplasty by Dr. Youngblood on 4/6/21.  Postoperatively he was started on aspirin 325 g twice daily for DVT prevention.  Per records patient does not tolerate Percocet currently on tramadol for  "pain control.  Patient was allowed weightbearing castrated bilateral lower extremity.     Patient was evaluated by physical medicine rehabilitation deemed to be a good candidate for acute rehabilitation.  Patient now transferred to [facility] also for comprehensive acute inpatient rehabitation admitted on April 9, 2021.    Hospital Course  While at [facility] also patient received physical therapy and Occupational Therapy.  She was followed by Dr. Orozco for medical management and Dr. Bonilla psychiatry.  Her hospital course was uneventful in fact patient did make excellent functional gains.  Her range of motion bilateral knees was very good and higher bilateral knee incisions were healing well.      On 4/17/2021     subjective: Patient seen and examined no chest pain or shortness of breath,  Doing well, patient is very happy with her stay at [facility] hospital ready to be discharged home today, again discussed with patient fall precautions and follow-ups.  Team 3/15 :     Nursing : continent B&B, skin intact     PT : transfers /amb CS, steps touching assist RW     OT : Mod I in the room     Physical examination:     His examination today showed a 65-year-old female no acute distress.  Patient awake alert oriented x3.     Blood pressure (!) 112/58, pulse 82, temperature 36.7 °C (98 °F), temperature source Oral, resp. rate 18, height 1.588 m (5' 2.5\"), weight 102 kg (224 lb 4.8 oz), SpO2 95 %, not currently breastfeeding.        Head normocephalic, sclera nonicteric, mucous brains moist.     Neck supple     Heart regular S1-S2     Lungs respiration nonlabored     GI examination was benign.     Musculoskeletal exam: Passive range of motion within functional limit bilateral upper extremity.    Patient with good bilateral knee range of motion, patient with good quad function bilaterally.  There was no evidence of calf tenderness or foot drop.     Neuro exam: Mental status as above patient able to obey " simple commands.  Cranial nerve examination shows face symmetric, tongue midline, ocular motility intact and visual fields are full.  Motor examination bilateral upper extremity 5/5.  Examination of bilateral lower extremity exam was suboptimal due to recent bilateral knee surgery there was hip and ankle 5/5.  Sensory examination grossly normal.  Deep tendon reflexes were symmetrical bilateral upper extremity.  Gait not tested at this time.       Bilateral knee incisions dry clean and intact covered with zip shape dressing, no drainage or erythema both incisions look excellent covered by Telfa/tape    Operative Procedures Performed    Consults: general medicine and psychiatry  Procedures: none    Patient was discharged home in satisfactory clinical condition.  Instruction regarding her medications, fall precautions and follow-ups were given.    This patient note has been dictated using speech recognition software.  Inadvertent speech recognition errors should be disregarded. Please do not hesitate to call my office for clarification.

## 2021-06-08 ENCOUNTER — TRANSCRIBE ORDERS (OUTPATIENT)
Dept: SCHEDULING | Facility: REHABILITATION | Age: 66
End: 2021-06-08

## 2021-06-08 DIAGNOSIS — Z96.653 HX OF TOTAL KNEE ARTHROPLASTY, BILATERAL: Primary | ICD-10-CM

## 2021-09-22 NOTE — PROGRESS NOTES
[facility]   Facility/Department: [facility]  Speech Language Pathology  Modified Barium Swallow (MBS)/Videofluoroscopic Study of Swallowing    NAME:Shaggy Alfaro  : 1944 (78 y.o.)   MRN: [id number]  ROOM: [id number]/Q240-43  ADMISSION DATE: 2021  PATIENT DIAGNOSIS(ES): Abnormality of gait following cerebrovascular accident (CVA) [Q23.678, R26.9]  Abnormality of gait and mobility [R26.9]  No chief complaint on file.     Patient Active Problem List    Diagnosis Date Noted    Abnormality of gait and mobility 2021    Gait abnormality 2021    Renal disease 2021    Hematuria 2021    Spinal stenosis of lumbar region with neurogenic claudication 2021    Myelopathy concurrent with and due to spinal stenosis of cervical region [facility]) 2021    Cerebral infarction due to embolism of right cerebellar artery ([facility] [address] 2021    Ataxic gait 2021    Numbness     Stroke determined by clinical assessment ([facility] [address] 09/10/2021    Cellulitis 2019    Cellulitis of left hand 2019    Shoulder dislocation 2016     Past Medical History:   Diagnosis Date    Cellulitis of left hand 3/26/2019    Hypertension      Past Surgical History:   Procedure Laterality Date    BACK SURGERY       No Known Allergies    Date of Onset: 09/10/21      Date of Evaluation: 2021   Evaluating Therapist: SIVA Lala      SUMMARY OF EVALUATION  DYSPHAGIA DIAGNOSIS: Mild oropharyngeal dysphagia     DIET RECOMMENDATIONS: Soft/bite size and thin liquids      FEEDING RECOMMENDATIONS:   Feed in upright position  Small bites/sips  Eat/Feed at a slow rate      THERAPY RECOMMENDATIONS:   Therapy is recommended to:  Assess tolerance of recommended diet  Improve oral motor strength and range of motion  Improve tongue base retraction  Improve laryngeal strength and range of motion      Nursing notified: Niki Lee RN      OBJECTIVE ASSESSMENT    Oral mechanism examination:  Decreased lingual Patient: Lili Serrato  Location: [facility] [address]  MRN: [id number]  Today's date: 4/16/2021    History of Present Illness  Lili is a 65 y.o. female admitted on 4/9/2021 with Status post bilateral knee replacements [Z96.653]. Principal problem is Status post bilateral knee replacements. Pt with hx of bilateral knee pain due to DJD.  Pain did not respond to conservative management.  Pt admitted to [facility] and underwent bilateral knee arthroplasty by Dr. Youngblood.  Patient was allowed weightbearing as tolerated bilateral lower extremity.      Past Medical History  Lili has a past medical history of Arthritis, Asthma, Depression, GERD (gastroesophageal reflux disease), Hypothyroidism, and Lipid disorder.      PT Vitals    Date/Time Pulse HR Source BP BP Location BP Method Pt Position Who   04/16/21 0806 91 Monitor 132/77 Right upper arm Automatic Sitting DT      PT Pain    Date/Time Pain Type Pref Pain Scale Side Orientation Location Rating: Rest Onset/Duration Who   04/16/21 0806 Pain Assessment number (Numeric Rating Pain Scale) -- -- 5 -- premedicated for activity DT   04/16/21 0910 Pain Assessment number (Numeric Rating Pain Scale) bilateral knee -- 5 cold applied DT          Prior Living Environment      Most Recent Value   People in Home  child(gm), adult   Living Arrangements  house   Location, Patient Bedroom  second floor, must negotiate stairs to access   Location, Bathroom  second floor, must negotiate stairs to access   Bathroom Access Comment  Pt. reports tub shower, no grab bars or DME          Prior Level of Function      Most Recent Value   Dominant Hand  right   Ambulation  independent   Transferring  independent   Toileting  independent   Bathing  independent   Dressing  independent   Assistive Device/Animal Currently Used at Home  other (see comments) [OT provided DME recs]          IRF PT Evaluation and Treatment - 04/16/21 0802        PT Time  Calculation    Start Time  0800     Stop Time  0930     Time Calculation (min)  90 min        Session Details    Document Type  discharge evaluation     Mode of Treatment  individual therapy;physical therapy        General Information    Patient Profile Reviewed  yes     General Observations of Patient  received in hospital room, NAD. Pt reporting 5/10 pain mostly in RLE     Existing Precautions/Restrictions  fall        Sensory Assessment (Somatosensory)    Left LE Sensory Assessment  general sensation;light touch awareness;light touch localization;proprioception;intact    L3-S2 intact with propio intact at great toe    Right LE Sensory Assessment  general sensation;light touch awareness;light touch localization;proprioception;intact    L3-S2 intact with propio intact at great toe       Range of Motion (ROM)    Left Lower Extremity (ROM)  knee     Knee, Left (ROM)  Ext 0 degrees; Flexion 110 degrees     Right Lower Extremity (ROM)  knee     Knee, Right (ROM)  Ext -3 degrees; Flexion 4-110     Comment: Range of Motion  measured supine with AAROM with strap assist        Strength (Manual Muscle Testing)    Hip, Left (Strength)  flexion 4, Ext 4, abd 3+, Add 3+ IR 3+, ER 3+     Knee, Left (Strength)  flexion 3+, Ext 3+     Ankle, Left (Strength)  DF 4, PF 4, Inv 4, Ev 4     Hip, Right (Strength)  flexion 4, Ext 4, abd 3+, Add 3+ IR 3+, ER 3+     Knee, Right (Strength)  flexion 3+, Ext 3+     Ankle, Right (Strength)  DF 4, PF 4, Inv 4, Ev 4        Bed Mobility    [place], Roll Left  modified independence     [place], Roll Right  modified independence     [place], Supine to Sit  modified independence     [place], Sit to Supine  modified independence     Assistive Device  none     Comment (Bed Mobility)  On EOM, at Mod I level        Transfers    Transfers  stand pivot transfer     Maintains Weight-Bearing Status (Transfers)  able to maintain        Bed to Chair Transfer    [place], Bed to Chair   ROM/strength  Decreased lingual coordination    Dentition:  Adequate    Current respiratory status:      Room air    Baseline Vocal Quality:  Normal    Cognitive status:   Alert  Aphasic      Diet Level Prior to this Evaluation:    ADULT DIET; Dysphagia - Soft and Bite Sized; Mildly Thick ([place])  Adult Oral Nutrition Supplement; Frozen Oral Supplement  Adult Oral Nutrition Supplement; Fortified Pudding Oral Supplement    PROCEDURE   Patient Positioning: Seated 70-90°  Viewing Plane(s): LATERAL ONLY  Contrast: commercially prepared, non-standardized barium viscosities; ranging from thin liquid to pudding consistency  Consistencies Administered During the Evaluation:   Solid (cookie)  Thin liquid      Method of Intake:   Self Feed    RESULTS     ORAL PHASE:  Reduced posterior propulsion: All  Anterior mastication: Regular  Prolonged mastication: Regular  Lingual Rocking: Thin      Oral Stage Impression:  Mild oral dysphagia characterized by reduced lingual strength/coodrination which resulted in delayed A-P transfer, prolonged mastication, and lingual rocking. Good oral clearance observed with independent lingual sweep to clear oral cavity. Increased difficulty observed with regular solids due anterior holding and prolonged mastication. PHARYNGEAL STAGE:    Reduced pharyngeal peristalsis: Thin  Pharyngeal residue- valleculae:  Thin        Aspiration Scale  Thin  Solid 1 Material does not enter the airway    2 Material enters the airway, remains above the vocal folds, and is ejected from the airway    3 Material enters the airway, remains above the vocal folds, and is not ejected from the airway    4 Material enters the airway, contacts the vocal folds, an is ejected from the airway    5 Material enters the airway, contacts the vocal folds, and is not ejected from the airway    6 Material enters the airway, passes below the vocal folds and is ejected into the larynx or out of the airway    7 Material enters the airway, passes below the vocal folds, and is not ejected from the trachea despite effort    8 Material enters the airway, passes below the vocal folds, and no effort is made to eject. Pharyngeal Stage Impression:  Mild pharyngeal dysphagia characterized by reduced tongue base strength, and pharyngeal contraction which resulted min residue at the level of the valleculate. Pt was able to clear pharyngeal cavity with delayed independent double swallow. No aspiration was observed during videofluoroscopy. CERVICAL ESOPHAGEAL STAGE :   WFL             PLAN OF CARE:   Long Term Goals:    1. Patient will tolerate least restrictive diet with no overt s/s of difficulty or aspiration. Short Term Goals:   Pt to be seen  2-4 x/week during LOS or until goals met  1. Pt will complete lingual exercises with 80% acc and min cues to improve oral motor strength and swallow function. 2. Pt will complete tongue base exercises with 80% acc and min cues to improve lingual strength and posterior propulsion during oral phase of swallow. 3.   Pt will improve hyolaryngeal elevation by performing hyolaryngeal exercises (Mendelson, Shaker, Falsetto, etc) with 90% accuracy in order to strengthen and establish a more effective swallow. 4.  Pt will complete pharyngeal strengthening exercises (such as the Blanca, Effortful swallow, etc) with 90% accuracy in order to strengthen and establish and more effective swallow. 5.  Pt will tolerate therapeutic trials of regular solids with demonstrating no overt s/s of difficulty or aspiration on 100% trials. 6.  Pt will demonstrate small bites/sips and slow rate strategies for safe and efficient swallow of recommended consistencies in all given opportunities. 7.  Pt will tolerate the recommended diet level with no s/s of aspiration.     Prognosis for improvements is: Good,  motivation      Pain Assessment:  Pre-Treatment  Pain assessment: 0-10  Pain level: modified independence     Assistive Device  walker, front-wheeled     Comment  from EOM to chair        Chair to Bed Transfer    [place], Chair to Bed  modified independence     Assistive Device  walker, front-wheeled     Comment  from w/c to EOM        Sit to Stand Transfer    [place], Sit to Stand Transfer  modified independence     Assistive Device  walker, front-wheeled     Comment  from w/c and EOM        Stand to Sit Transfer    [place], Stand to Sit Transfer  modified independence     Assistive Device  walker, front-wheeled     Comment  to w/c and EOM        Stand Pivot Transfer    [place], Stand Pivot/Stand Step Transfer  modified independence     Assistive Device  walker, front-wheeled     Comment  SPT at mod I        Car Transfer    Transfer Technique  stand pivot     [place], Car Transfer  modified independence     Assistive Device  walker, front-wheeled     Comment  amb approach to car with mod I and RW        Gait Training    [place], Gait  modified independence     Assistive Device  walker, front-wheeled     Distance in Feet  200 feet     Pattern (Gait)  step-through     Deviations/Abnormal Patterns (Gait)  trey decreased     Bilateral Gait Deviations  heel strike decreased     [place], Picking Up Object  modified independence    performed with reacher    Comment (Gait/Stairs)  + 200ft x2 + 50ft x2 at Mod I level        Curb Negotiation    [place]  modified independence     Assistive Device  walker, front-wheeled     Curb Height  6 inches     Comment  up/down 6in + 2in curb step with RLE leading to ascend and RLE leading to descend        Sloped Surface Gait Skills    [place]  modified independence     Assistive Device  walker, front-wheeled     Distance in Feet  5 feet     Comment  up/down 5ft ramp Marisabel        Stairs Training    [place], Stairs  modified independence     Assistive Device  railing     Handrail Location (Stairs)  left side  (ascending);right side (descending)     Number of Stairs  12     Stair Height  6 inches     Ascending Stairs Technique  step-to-step    LLE leading laterally    Descending Stairs Technique  step-to-step    RLE leading laterally    Comment  Mod I        Balance    Balance Assessment  sitting static balance;sitting dynamic balance;standing static balance;standing dynamic balance     Static Sitting Balance  WFL;sitting in chair;unsupported     Dynamic Sitting Balance  WFL;unsupported;sitting in chair     Static Standing Balance  WFL;supported;standing    supported with RW    Dynamic Standing Balance  WFL;supported;standing    supported with RW       Motor Skills    Motor Skills  coordination;functional endurance;muscle tone;postural deviations     Coordination  gross motor deficit;bilateral;lower extremity    Heel to shing impaired due to ROM deficit, WHIT WFL    Functional Endurance  good    ambulation distances >150ft    Muscle Tone  WNL        Postural Deviations    Pelvis  posterior pelvic tilt        Lower Extremity (Therapeutic Exercise)    Comment  Supine; 2x15 AROM GS, QS, heel slides (AAROM), sliding hip abduction/adduction        Modality Treatment    Treatment Location 1 (Modality)  B Knees     Modality Performed  cryotherapy     Modality Treatment Results  decrease in pain     Comment, Modality Treatment  Ice packs to posterior aspect of B knees x 10'; skin check following ice pack removal is unreamrkable blanchable redness        Discharge Summary (PT)    Outcomes Achieved/Progress Made Upon Discharge (PT)  all goals met within established timeframes     Transfer to Another Level of Care or Facility (PT)  recommend continued therapy following discharge;recommend therapy via home health     Discharge Summary Statement (PT)  Pt is a 64 yo female with history of Arthritis, Asthma, Depression, GERD (gastroesophageal reflux disease), Hypothyroidism, and Lipid disorder. Principal problem is gait dysfunction  0  Intervention:  Patient denies pain. Post-Treatment  Pain assessment: 0-10  Pain level: 0  Intervention:  Patient denies pain. Radiologist:  Available on Consult as needed, Radiology Tech present    Treatment goals were discussed with the:   Patient. , who gives fair understanding of recommendations. .  Reinforcement of recommendations is needed. Thank you for your referral.  Please direct any questions or concerns to Speech Pathology. Images are available through [facility]/PACS. Please see radiologist report for additional details.     NATIONAL OUTCOMES MEASUREMENT SYSTEM (NOMS):  SWALLOWING  Ratin/6        Therapy Time  SLP Individual Minutes  Time In: 7354  Time Out: Leyda 73  Minutes: 15          Signature:  Electronically signed by SIVA Delcid on 2021 at 4:22 PM secondary to bilateral TKA. Pt PLOF was independent with elevations, ambulation and transfers and all other IADLs with no AD.  Pt has been medically cleared for comprehensive IP at [facility].  Pt is Mod I to roll L and R and supine to sit and Mod I for sit <> supine, sit <> stand and SPT, Mod I for ambulation of 200ft with RW.  Pt's self-selected gait speed is 0.52 m/s which is and improvement from previous attempt of 0.36m/s however continues to be below her age / gender related norms demonstrating she is at a high risk for falls. Pt is limited by impaired balance, decreased strength, and impaired functional activity tolerance.  Pt will benefit from skilled homecare PT to address the above impairments and maximize functional ability.  Home exercise program, FT and AD provided to patient.  Pt verbalized understanding and in agreement of PT recommendation to utilize RW for ambulation and transfers.                 Education provided this session. See the Patient Education summary report for full details.    IRF PT Goals      Most Recent Value   Bed Mobility Goal 1   Activity/Assistive Device  rolling to left, rolling to right, sit to supine, supine to sit at 04/10/2021 0833   [place]  modified independence at 04/10/2021 0833   Time Frame  short-term goal (STG), 5 - 7 days at 04/15/2021 0800   Progress/Outcome  goal met at 04/16/2021 0802   Bed Mobility Goal 2   Activity/Assistive Device  rolling to left, rolling to right, sit to supine, supine to sit at 04/10/2021 0833   [place]  modified independence at 04/10/2021 0833   Time Frame  long-term goal (LTG), 2 weeks [STG = LTG] at 04/10/2021 0833   Progress/Outcome  goal met at 04/16/2021 0802   Transfer Goal 1   Activity/Assistive Device  sit-to-stand/stand-to-sit, stand pivot, car transfer, walker, front-wheeled at 04/10/2021 1135   [place]  supervision required, verbal cues required at 04/10/2021 1135   Time Frame  short-term goal (STG),  1 week at 04/10/2021 1135   Progress/Outcome  goal met at 04/16/2021 0802   Transfer Goal 2   Activity/Assistive Device  sit-to-stand/stand-to-sit, car transfer, walker, front-wheeled at 04/10/2021 1135   [place]  modified independence at 04/10/2021 1135   Time Frame  long-term goal (LTG), 2 weeks at 04/10/2021 1135   Progress/Outcome  goal met at 04/16/2021 0802   Gait/Walking Locomotion Goal 1   Activity/Assistive Device  gait (walking locomotion), assistive device use at 04/10/2021 1135   Distance  200 feet at 04/10/2021 1135   [place]  supervision required, verbal cues required at 04/10/2021 1135   Time Frame  short-term goal (STG), 1 week at 04/10/2021 1135   Progress/Outcome  goal met at 04/16/2021 0802   Gait/Walking Locomotion Goal 2   Activity/Assistive Device  gait (walking locomotion), assistive device use at 04/10/2021 1135   Distance  200 feet at 04/10/2021 1135   [place]  modified independence at 04/10/2021 1135   Time Frame  short-term goal (STG), 2 weeks at 04/10/2021 1135   Progress/Outcome  goal met at 04/16/2021 0802   Stairs Goal 1   Activity/Assistive Device  ascending stairs, descending stairs, using handrail, left, cane, straight at 04/10/2021 0833   Number of Stairs  8 at 04/10/2021 0833   [place]  supervision required at 04/10/2021 0833   Time Frame  short-term goal (STG), 5 - 7 days at 04/15/2021 0800   Progress/Outcome  goal met at 04/16/2021 0802   Stairs Goal 2   Activity/Assistive Device  ascending stairs, descending stairs, using handrail, left, cane, straight at 04/10/2021 0833   Number of Stairs  12 at 04/10/2021 0833   [place]  modified independence at 04/10/2021 0833   Time Frame  long-term goal (LTG), 2 weeks at 04/10/2021 0833   Progress/Outcome  goal met at 04/16/2021 0802

## 2023-07-21 ENCOUNTER — TELEPHONE (OUTPATIENT)
Dept: NEUROLOGY | Facility: CLINIC | Age: 68
End: 2023-07-21

## 2023-07-28 ENCOUNTER — CONSULT (OUTPATIENT)
Dept: NEUROLOGY | Facility: CLINIC | Age: 68
End: 2023-07-28
Payer: COMMERCIAL

## 2023-07-28 VITALS
SYSTOLIC BLOOD PRESSURE: 144 MMHG | BODY MASS INDEX: 39.29 KG/M2 | WEIGHT: 213.5 LBS | HEART RATE: 76 BPM | TEMPERATURE: 97.7 F | DIASTOLIC BLOOD PRESSURE: 74 MMHG | HEIGHT: 62 IN | OXYGEN SATURATION: 96 %

## 2023-07-28 DIAGNOSIS — F41.9 ANXIETY AND DEPRESSION: ICD-10-CM

## 2023-07-28 DIAGNOSIS — R29.818 SUSPECTED SLEEP APNEA: ICD-10-CM

## 2023-07-28 DIAGNOSIS — F32.A ANXIETY AND DEPRESSION: ICD-10-CM

## 2023-07-28 DIAGNOSIS — E66.9 OBESITY (BMI 30-39.9): ICD-10-CM

## 2023-07-28 DIAGNOSIS — Z87.820 HISTORY OF CONCUSSION: ICD-10-CM

## 2023-07-28 DIAGNOSIS — G43.009 MIGRAINE WITHOUT AURA AND WITHOUT STATUS MIGRAINOSUS, NOT INTRACTABLE: ICD-10-CM

## 2023-07-28 DIAGNOSIS — G44.309 POST-TRAUMATIC HEADACHE: Primary | ICD-10-CM

## 2023-07-28 PROCEDURE — 99204 OFFICE O/P NEW MOD 45 MIN: CPT | Performed by: STUDENT IN AN ORGANIZED HEALTH CARE EDUCATION/TRAINING PROGRAM

## 2023-07-28 RX ORDER — LEVOTHYROXINE SODIUM 0.03 MG/1
TABLET ORAL
COMMUNITY
Start: 2023-05-06

## 2023-07-28 RX ORDER — ASPIRIN 81 MG/1
TABLET ORAL EVERY 24 HOURS
COMMUNITY

## 2023-07-28 RX ORDER — AMLODIPINE BESYLATE 10 MG/1
TABLET ORAL
COMMUNITY
Start: 2023-04-06

## 2023-07-28 RX ORDER — LANOLIN ALCOHOL/MO/W.PET/CERES
1000 CREAM (GRAM) TOPICAL
COMMUNITY
Start: 2023-05-08

## 2023-07-28 RX ORDER — PREDNISONE 20 MG/1
TABLET ORAL
Qty: 12 TABLET | Refills: 0 | Status: SHIPPED | OUTPATIENT
Start: 2023-07-28 | End: 2023-08-03

## 2023-07-28 RX ORDER — ROSUVASTATIN CALCIUM 20 MG/1
10 TABLET, COATED ORAL
COMMUNITY
Start: 2023-04-06

## 2023-07-28 RX ORDER — BUPROPION HYDROCHLORIDE 300 MG/1
300 TABLET ORAL DAILY
COMMUNITY
Start: 2023-07-03

## 2023-07-28 RX ORDER — LORATADINE 10 MG/1
10 TABLET ORAL DAILY
COMMUNITY

## 2023-07-28 RX ORDER — SERTRALINE HYDROCHLORIDE 100 MG/1
200 TABLET, FILM COATED ORAL DAILY
COMMUNITY
Start: 2023-07-05

## 2023-07-28 RX ORDER — OMEPRAZOLE 20 MG/1
CAPSULE, DELAYED RELEASE ORAL
COMMUNITY
Start: 2023-04-30

## 2023-07-28 NOTE — PROGRESS NOTES
[facility] Consult   PATIENT:  Christoph Boss  MRN:  [id number]  :  1955  DATE OF SERVICE:  2023  REFERRED BY: No ref. provider found  PMD: Soraida Bachelor    Assessment:     Christoph Boss is a very pleasant 74 y.o. female with a past medical history that includes anxiety, depression, HLD, prediabetes referred here for evaluation of mild TBI/concussion. Ms. Christi Stoddard hit her head on the ceiling on 5/10/2023. Since that time she has had persistent, nearly daily headaches. 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 We discussed a variety of potential treatment options, thought that we would bridge her with a course of prednisone and have her try magnesium and B2 supplements for prevention. She is pending a scheduled MRI that was ordered by her PCP 2 weeks I have asked her to send us the results of that test.  I have ordered a sleep study for her as well given the fact that she gets about 10 hours of sleep per night, but reports feeling unrefreshed in the morning. He is currently out of work until the end of August, so we will continue to work on her headache during this time, but from my standpoint I have no specific restrictions with her returning to work and asked that she do things as tolerated. I have also emphasized the importance of trying to incorporate physical activity into her recovery. Workup:  - Neurocognitive assessment reveals normal neurological exam  - MRI scheduled for  at [facility]  - Sleep study  - Labs: B12, Folate    -We have discussed concussions and the natural course of recovery. We have discussed that symptoms from a concussion typically take 2 weeks to resolve, and although sometimes it can feel like concussion symptoms linger on, at this point these symptoms would be related to contributing factors. We also discussed that the course may wax and wane.   - Contributing factors may include:   Prolonged removal from normal routine,  posttraumatic headache,  comorbid injuries, preexisting chronic headaches or migraines, cervicogenic headache, medication overuse headache, preexisting learning disability, history of concussion with prolonged recovery, anxiety or depression, stress, deconditioning,  comorbid medical diagnoses, young age. - I have recommended gradual return of normal cognitive and physical activity with safety precautions  - We discussed that newer research regarding concussion shows that the sooner one returns gradually to their normal physical and cognitive routine, the sooner one tends to recover. Prolonged removal from normal routine and deconditioning have been shown to prolong symptoms and worsen depression.   - We discussed that sometimes there is a constellation of symptoms that some refer to as "post concussion syndrome," but I prefer not to use this term since that can be misleading and make people think they are still brain injured or "concussed," when the most common and likely etiology this far out from the head trauma is either contributing factors or a form of functional neurologic disorder with mixed symptoms, especially after a thorough workup to rule out other etiologies since concussion would not be the direct cause at this point.   - We discussed how cognitive issues can have multiple causes and often related to multifactorial etiologies including stress, anxiety,  mood, pain, hypervigilance  and sleep issues and provided reassurance that, it is not likely the cognitive dysfunction is related to concussion at this point.   - Safe driving precautions, should not drive at all if feeling sleepy or cognitively not well. Preventative:  - we discussed headache hygiene and lifestyle factors that may improve headaches  - Mg and B2  - Currently on through other providers:  Wellbutrin, Zoloft, Amlodipine  - Past/ failed/contraindicated: None  - future options: Topamax, Memantine, CGRP med, botox    Acute:  - discussed not taking over-the-counter or prescription pain medications more than 3 days per week to prevent medication overuse/rebound headache  - Prednisone bridge  - Currently on through other providers: None  - Past/ failed/contraindicated: None  - future options:  Triptan, prochlorperazine, Toradol IM or p.o., could consider trial of 5 days of Depakote 500 mg nightly or dexamethasone 2 mg daily for prolonged migraine, marija Curtis nurtec  Patient instructions: Activity Plan:  General counseling as discussed regarding appropriate level of tolerable physical activity. Gradual return to physical activity. It is ok to push through light symptoms, we just don't want to significantly exacerbate symptoms. Additional Testing or Referrals:   - Labs: B12 and Folate  - MRI scheduled  - Sleep study    Headache/migraine treatment:   Abortive medications (for immediate treatment of a headache): Ok to take ibuprofen or acetaminophen for headaches, but try to limit the amount and frequency that you are taking to avoid medication overuse/rebound headache. Ideally no more than 2-3 days per week. Bridge  Prednisone Bridge  Day 1 and 2: 60mg (3 tablets)  Day 3 and 4: 40mg (2 tablets)  Day 5 and 6: 20mg (1 tablet)    Over the counter preventive supplements for headaches/migraines - try for 2-3 months at least   (to take every day to help prevent headaches - not to take at the time of headache):  - Magnesium 400mg daily  - Can occasionally cause stomach upset - if so try at night, with food or stop, rarely can cause diarrhea if so stop - oxide or glycinate  - Riboflavin (Vitamin B2) 400mg daily - FYI B2 may make your urine bright/neon yellow - find online     Lifestyle Recommendations:  - Maintain good sleep hygiene.   Going to bed and waking up at consistent times, avoiding excessive daytime naps, avoiding caffeinated beverages in the evening, avoid excessive stimulation in the evening and generally using bed primarily for sleeping. One hour before bedtime would recommend turning lights down lower, decreasing your activity (may read quietly, listen to music at a low volume). When you get into bed, should eliminate all technology (no texting, emailing, playing with your phone, iPad or tablet in bed). - Maintain good hydration. Drink  2L of fluid a day (4 typical small water bottles)  - Maintain good nutrition. In particular don't skip meals and eat balanced meals regularly. Education and Follow-up  - Please contact us if any questions or concerns arise. Of course, try to protect yourself from head injuries, and if any new concerning symptoms or significant blow to the head or body go to the emergency department. - Follow up in 4 months  CC:   Alice Chung is a  right handed female who presents for evaluation following a possible concussion. History obtained from patient as well as available medical record review. This is a Case that may be under litigation. We will not be writing any letters or working with  on their behalf. However, we will continue to do our best to provide the best medical care possible. 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 Date/time of injury: 5/10/23   Definite reported mechanism of injury?   (discrete event with force to the head or rapid head movement without impact): Yes   Mechanism/Cause of injury: Hit head on ceiling  Impact Location: "Squarely on the top of my head"   Intracranial injury or skull fx?: No  Amnesia:  Anjum? negative; Retro? negative  Loss of Conciousness?  did not occur  Seizure?  No  Was there an onset of typical symptoms within 24-48 hours of the injury event? Yes  Has there been gradual recovery or stability of symptoms over the first week of the injury? [x] Yes (There have been improving symptoms over the first week)  [] Yes (There have been stable symptoms over the first week)  [] No (There have been worsening symptoms over the first week)    Specifics:  - Seen in urgent care on 5/10/23 after hitting her head on the ceiling. Reported seeing stars and feeling nauseous. Also endorsed dull headache. Primary issues at this time:  [x] Headaches [] Oculomotor [] Vestibular [] Cognitive [] Mood [] Sleep/Fatigue     Headache   Headaches started at what age? - The patient reported that she "was never a headache person" - and denies that she has regular headaches and never had a migraine headache  - The new headache started the day of the injury and are described as below and report that when she is reading, overworked, overthinking,     How often do the headaches occur?  - as of 7/28/2023: The patient reports that she has 20 headaches days per month. What time of the day do the headaches start? None that the patient is able to state  How long do the headaches last? When untreated they last around one hour. When treated the headache was resolved before shortly returning around 30 minutes later  Are you ever headache free? Yes    Aura? without aura     Last eye exam:   - 2021     Where is your headache located and pain quality? - The headache is reported to start behind the bilateral eyes and on the bilateral forehead  - The headache is not reported to radiate to the anterior, posterior, lateral aspects of the neck, to the jaw, to the auricular areas or the the rest of the cranium. What is the intensity of pain? Worst 9/10, Average: 5/10  Associated symptoms:   [x] Nausea       [x] Vomiting  [x] Stiff or sore neck (Initially yes, not currently)  [x] Problems with concentration (Ex.  When reading the patient would need to require re reading a sentence and there was delayed information processing)  [x] Photophobia     [x]Phonophobia  [x] Prefer quiet, dark room  [x] Light-headed or dizzy     [x] Tinnitus      Things that make the headache worse? Denies: coughing, sneezing,  moving bowel, climbing stair, with sexual activity  Admits: bending over,running or exercising, walking    Headache triggers:  - Reading  - Loud noises  - Bright lights  - Sensory overstimulation    Have you seen someone else for headaches or pain? No  Have you had trigger point injection performed and how often? No  Have you had Botox injection performed and how often? No   Have you had epidural injections or transforaminal injections performed? No  Are you current pregnant or planning on getting pregnant? No   Have you ever had any Brain imaging? Yes    12/31/2008 CTH wo  IMPRESSION:   No acute intracranial hemorrhage or depressed calvarial fracture. March 2017 (Patient was worked up for a TIA)  - CTH/MRI    What medications do you take or have you taken for your headaches?    ABORTIVE:    OTC medications: APAP, Ibuprofen  Prescription: None    Past/ failed/contraindicated:  OTC medications: None  Prescription: None    PREVENTIVE:   Wellbutrin, Zoloft, Amlodipine    Past/ failed/contraindicated:  None    Physical activity at baseline:   " I am pretty sedentary "    Current level of physical activity:   " I am pretty sedentary "    Sleep: 10 hours per night on average  - Before injury: 9 hours per night on average  - Positive snoring  - Prior sleep study around 2020    Trouble falling asleep: [] Yes [x] No  Trouble staying asleep: [] Yes [x] No  History of sleep apnea: [] Yes [x] No    Water:   - Pure water: 16-20oz  - Gator-rade: 16oz  - 16oz coffee     Diet:  - "I eat pretty healthy" - yogurt, whole wheat , turkey sandwich for lunch, "I eat a lot of vegetables with fish and chicken"     The following portions of the patient's history were reviewed in the system and updated as appropriate: allergies, current medications, past family history, past medical history, past social history, past surgical history and problem list.    Pertinent family history:  [x] Migraines (Maternal Sister)   [x] Learning disability (ADHD, dyslexia) (Maternal Sister and her son (OCD/ADHD))   [x] Psych disorder (depression, anxiety)(Son hospitalized for depression Suicidal, all children have anxiety, maternal mother anxiety)    Pertinent social history:  Work: Clinical Trial Specialist  Education: Some college  lives with their son    Illicit Drugs: denies  Alcohol/tobacco: alcohol intake: none, Tobacco use: Smoked 0 packs per day for 1 years, Caffeine intake: 1 cups of caffeinated coffee per day(s) (6 cigarettes)  Past Medical History:   1. Any history of prior Concussion? No  Any other TBI's aside from Concussion? no     2. Preexisting Headache history?  negative     3. Preexisting Psych history? positive      [x] Anxiety  [x] Depression  Prior Psych treatment? yes    4. Preexisting Learning disability? yes    [] ADHD   [] Other  Type of educational services received prior to concussion? [] Regular education    [] Special Education ([facility], [phone])    [] Unknown      5. Preexisting Sleep problems? no    6. History of seizures/epilepsy (non febrile) no    Past Medical History:   Diagnosis Date   • GERD (gastroesophageal reflux disease)    • Hypertension    • Prediabetes      There is no problem list on file for this patient.     Medications:      Current Outpatient Medications   Medication Sig Dispense Refill   • amLODIPine (NORVASC) 10 mg tablet   See Instructions, TAKE ONE TABLET BY MOUTH AT BEDTIME, # 90 tab(s), 1 Refill(s), Pharmacy: [facility] #[id number], 158.75, 06/02/22 19:01:00 EDT, Height/Length, cm, 95.708, 06/02/22 19:01:00 EDT, Weight Measured, kg     • aspirin (ECOTRIN LOW STRENGTH) 81 mg EC tablet every 24 hours     • buPROPion (WELLBUTRIN XL) 300 mg 24 hr tablet Take 300 mg by mouth daily     • levothyroxine 25 mcg tablet   = 1 tab(s), Oral, daily, # 90 tab(s), 0 Refill(s), Pharmacy: [facility] #[id number], 158.75, 04/27/23 18:39:00 EDT, Height/Length, cm, 98.43, 04/27/23 18:39:00 EDT, Weight Measured, kg     • loratadine (CLARITIN) 10 mg tablet Take 10 mg by mouth daily     • omeprazole (PriLOSEC) 20 mg delayed release capsule TAKE ONE CAPSULE BY MOUTH EVERY DAY  MAY OPEN CAPSULE AND PUT CONTENTS IN APPLE SAUCE IF NEEDED TO SWALLOW)     • rosuvastatin (CRESTOR) 20 MG tablet 10 mg     • sertraline (ZOLOFT) 100 mg tablet Take 200 mg by mouth daily     • vitamin B-12 (VITAMIN B-12) 1,000 mcg tablet 1,000 mcg       No current facility-administered medications for this visit. Allergies:       Allergies   Allergen Reactions   • Other GI Intolerance     Eggplant   • Oxycodone-Acetaminophen Itching   • Penicillins Dizziness     unknown  unknown     • Shellfish Allergy - Food Allergy Other (See Comments)       Family History:        Family History   Problem Relation Age of Onset   • Breast cancer Mother    • Hypertension Mother    • Hyperlipidemia Mother    • Hyperlipidemia Father    • Hypertension Father    • Kidney disease Father    • Heart failure Father          Social History:       Social History     Socioeconomic History   • Marital status: Single     Spouse name: Not on file   • Number of children: Not on file   • Years of education: Not on file   • Highest education level: Not on file   Occupational History   • Not on file   Tobacco Use   • Smoking status: Never   • Smokeless tobacco: Never   Vaping Use   • Vaping Use: Never used   Substance and Sexual Activity   • Alcohol use: Not Currently   • Drug use: Never   • Sexual activity: Not on file   Other Topics Concern   • Not on file   Social History Narrative   • Not on file     Social Determinants of Health     Financial Resource Strain: Not on file   Food Insecurity: Not on file   Transportation Needs: Not on file   Physical Activity: Not on file   Stress: Not on file   Social Connections: Not on file   Intimate Partner Violence: Not on file   Housing Stability: Not on file       Objective:   Physical Exam:                                                                 Vitals:            Constitutional:    /74 (BP Location: Left arm, Patient Position: Sitting, Cuff Size: Large)   Pulse 76   Temp 97.7 °F (36.5 °C) (Temporal)   Ht 5' 2" (1.575 m)   Wt 96.8 kg (213 lb 8 oz)   SpO2 96%   BMI 39.05 kg/m²   BP Readings from Last 3 Encounters:   07/28/23 144/74     Pulse Readings from Last 3 Encounters:   07/28/23 76         Well developed, well nourished, well groomed. No dysmorphic features. HEENT:  Normocephalic atraumatic. See neuro exam   Chest:  Respirations appear regular and unlabored. Cardiovascular:  no observed significant swelling. Musculoskeletal:  (see below under neurologic exam for evaluation of motor function and gait)   Skin:  warm and dry, not diaphoretic. Psychiatric:  Normal behavior and appropriate affect       Neurological Examination:     Mental status/cognitive function:   Recent and remote memory intact. Attention span and concentration as well as fund of knowledge are appropriate for age. Normal language and spontaneous speech. Cranial Nerves:  III, IV, VI-Pupils were equal, round. Extraocular movements were full and conjugate   VII-facial expression symmetric  VIII-hearing grossly intact bilaterally   Motor Exam: symmetric bulk throughout. no atrophy, fasciculations or abnormal movements noted. Coordination:  no apparent dysmetria, ataxia or tremor noted  Gait: steady casual gait    Pertinent lab results: None     Pertinent Imaging: None  Review of Systems:   Constitutional: Positive for fatigue. Negative for appetite change and fever. HENT: Negative. Negative for hearing loss, tinnitus, trouble swallowing and voice change. Eyes: Negative.   Negative for photophobia, pain and visual disturbance. Respiratory: Negative. Negative for shortness of breath. Cardiovascular: Negative. Negative for palpitations. Gastrointestinal: Positive for nausea. Negative for vomiting. Endocrine: Negative. Negative for cold intolerance. Genitourinary: Negative. Negative for dysuria, frequency and urgency. Musculoskeletal: Negative for back pain, gait problem, myalgias and neck pain. Skin: Negative. Negative for rash. Allergic/Immunologic: Negative. Neurological: Positive for headaches. Negative for dizziness, tremors, seizures, syncope, facial asymmetry, speech difficulty, weakness, light-headedness and numbness. Hematological: Negative. Does not bruise/bleed easily. Psychiatric/Behavioral: Negative. Negative for confusion, hallucinations and sleep disturbance. Delayed processing     I have spent 35 minutes with Patient  today in which greater than 50% of this time was spent in counseling/coordination of care regarding Prognosis, Risks and benefits of tx options, Patient and family education, Impressions, Documenting in the medical record, Reviewing / ordering tests, medicine, procedures   and Obtaining or reviewing history  . I also spent 10 minutes non face to face for this patient the same day.      Activity Minutes   Precharting/reviewing 5   Patient care/counseling 35   Postcharting/care coordination 5       Author:  Claire Bryson DO  Fellowship trained Concussion Specialist

## 2023-07-28 NOTE — PROGRESS NOTES
Review of Systems   Constitutional: Positive for fatigue. Negative for appetite change and fever. HENT: Negative. Negative for hearing loss, tinnitus, trouble swallowing and voice change. Eyes: Negative. Negative for photophobia, pain and visual disturbance. Respiratory: Negative. Negative for shortness of breath. Cardiovascular: Negative. Negative for palpitations. Gastrointestinal: Positive for nausea. Negative for vomiting. Endocrine: Negative. Negative for cold intolerance. Genitourinary: Negative. Negative for dysuria, frequency and urgency. Musculoskeletal: Negative for back pain, gait problem, myalgias and neck pain. Skin: Negative. Negative for rash. Allergic/Immunologic: Negative. Neurological: Positive for headaches. Negative for dizziness, tremors, seizures, syncope, facial asymmetry, speech difficulty, weakness, light-headedness and numbness. Hematological: Negative. Does not bruise/bleed easily. Psychiatric/Behavioral: Negative. Negative for confusion, hallucinations and sleep disturbance.         Delayed processing

## 2023-07-28 NOTE — PATIENT INSTRUCTIONS
Activity Plan:  General counseling as discussed regarding appropriate level of tolerable physical activity. Gradual return to physical activity. It is ok to push through light symptoms, we just don't want to significantly exacerbate symptoms. Additional Testing or Referrals:   - Labs: B12 and Folate  - MRI scheduled  - Sleep study    Headache/migraine treatment:   Abortive medications (for immediate treatment of a headache): Ok to take ibuprofen or acetaminophen for headaches, but try to limit the amount and frequency that you are taking to avoid medication overuse/rebound headache. Ideally no more than 2-3 days per week. Bridge  Prednisone Bridge  Day 1 and 2: 60mg (3 tablets)  Day 3 and 4: 40mg (2 tablets)  Day 5 and 6: 20mg (1 tablet)    Over the counter preventive supplements for headaches/migraines - try for 2-3 months at least   (to take every day to help prevent headaches - not to take at the time of headache):  - Magnesium 400mg daily  - Can occasionally cause stomach upset - if so try at night, with food or stop, rarely can cause diarrhea if so stop - oxide or glycinate  - Riboflavin (Vitamin B2) 400mg daily - FYI B2 may make your urine bright/neon yellow - find online     Lifestyle Recommendations:  - Maintain good sleep hygiene. Going to bed and waking up at consistent times, avoiding excessive daytime naps, avoiding caffeinated beverages in the evening, avoid excessive stimulation in the evening and generally using bed primarily for sleeping. One hour before bedtime would recommend turning lights down lower, decreasing your activity (may read quietly, listen to music at a low volume). When you get into bed, should eliminate all technology (no texting, emailing, playing with your phone, iPad or tablet in bed). - Maintain good hydration. Drink  2L of fluid a day (4 typical small water bottles)  - Maintain good nutrition.  In particular don't skip meals and eat balanced meals regularly. Education and Follow-up  - Please contact us if any questions or concerns arise. Of course, try to protect yourself from head injuries, and if any new concerning symptoms or significant blow to the head or body go to the emergency department.   - Follow up in 4 months

## 2023-08-05 LAB
FOLATE SERPL-MCNC: 8.6 NG/ML
VIT B12 SERPL-MCNC: 1282 PG/ML (ref 232–1245)

## 2023-08-10 ENCOUNTER — TELEPHONE (OUTPATIENT)
Dept: SLEEP CENTER | Facility: CLINIC | Age: 68
End: 2023-08-10

## 2023-08-10 NOTE — TELEPHONE ENCOUNTER
----- Message from Tami Leary MD sent at 8/10/2023 12:54 PM EDT -----  Approved  ----- Message -----  From: Mabel Enriquez  Sent: 8/9/2023   8:35 AM EDT  To: Sleep Medicine [facility] Provider    This Home sleep study needs approval.     If approved please sign and return to clerical pool. If denied please include reasons why. Also provide alternative testing if warranted. Please sign and return to clerical pool.

## 2023-08-18 ENCOUNTER — PATIENT MESSAGE (OUTPATIENT)
Dept: NEUROLOGY | Facility: CLINIC | Age: 68
End: 2023-08-18

## 2023-11-30 ENCOUNTER — TELEPHONE (OUTPATIENT)
Dept: SLEEP CENTER | Facility: CLINIC | Age: 68
End: 2023-11-30

## 2023-11-30 NOTE — TELEPHONE ENCOUNTER
----- Message from Masood Hong MD sent at 11/30/2023  1:18 PM EST -----  Approved    ----- Message -----  From: Bev Cochran  Sent: 11/30/2023   8:23 AM EST  To: Sleep Medicine [facility] Provider    This Home sleep study needs approval.     If approved please sign and return to clerical pool. If denied please include reasons why. Also provide alternative testing if warranted. Please sign and return to clerical pool.

## 2023-12-29 ENCOUNTER — HOSPITAL ENCOUNTER (OUTPATIENT)
Dept: SLEEP CENTER | Facility: CLINIC | Age: 68
Discharge: HOME | End: 2023-12-29
Payer: COMMERCIAL

## 2023-12-29 DIAGNOSIS — R29.818 SUSPECTED SLEEP APNEA: ICD-10-CM

## 2023-12-29 DIAGNOSIS — E66.9 OBESITY (BMI 30-39.9): ICD-10-CM

## 2023-12-29 PROCEDURE — G0399 HOME SLEEP TEST/TYPE 3 PORTA: HCPCS

## 2023-12-29 NOTE — PROGRESS NOTES
Home Sleep Study Documentation    HOME STUDY DEVICE: Noxturnal no                                           Soni G3 yes      Pre-Sleep Home Study:    Set-up and instructions performed by: Felicity    Technician performed demonstration for Patient: yes    Return demonstration performed by Patient: yes    Written instructions provided to Patient: yes    Patient signed consent form: yes        Post-Sleep Home Study:    Additional comments by Patient: None    Home Sleep Study Failed:no:    Failure reason: N/A    Reported or Detected: N/A    Scored by: SOREN Bonilla

## 2024-01-02 PROBLEM — G47.33 OSA (OBSTRUCTIVE SLEEP APNEA): Status: ACTIVE | Noted: 2024-01-02

## 2024-01-02 PROCEDURE — 95806 SLEEP STUDY UNATT&RESP EFFT: CPT | Performed by: INTERNAL MEDICINE

## 2024-01-04 ENCOUNTER — TELEPHONE (OUTPATIENT)
Dept: SLEEP CENTER | Facility: CLINIC | Age: 69
End: 2024-01-04

## 2024-01-04 NOTE — TELEPHONE ENCOUNTER
Per Dr. Nava sleep study shows moderate HEIDI with hypoxemia.  Patient needs to be scheduled for consult     Left message call back message and sent a MyChart message

## 2024-01-15 ENCOUNTER — OFFICE VISIT (OUTPATIENT)
Dept: SLEEP CENTER | Facility: CLINIC | Age: 69
End: 2024-01-15
Payer: COMMERCIAL

## 2024-01-15 VITALS
WEIGHT: 213 LBS | SYSTOLIC BLOOD PRESSURE: 128 MMHG | HEIGHT: 62 IN | DIASTOLIC BLOOD PRESSURE: 83 MMHG | BODY MASS INDEX: 39.2 KG/M2

## 2024-01-15 DIAGNOSIS — G47.33 OSA (OBSTRUCTIVE SLEEP APNEA): Primary | ICD-10-CM

## 2024-01-15 PROBLEM — F32.A DEPRESSION: Status: ACTIVE | Noted: 2021-04-07

## 2024-01-15 PROBLEM — J30.2 SEASONAL ALLERGIES: Status: ACTIVE | Noted: 2021-04-07

## 2024-01-15 PROBLEM — J45.909 ASTHMA: Status: ACTIVE | Noted: 2021-04-07

## 2024-01-15 PROBLEM — Z96.659 S/P KNEE REPLACEMENT: Status: ACTIVE | Noted: 2024-01-15

## 2024-01-15 PROBLEM — K21.9 GERD (GASTROESOPHAGEAL REFLUX DISEASE): Status: ACTIVE | Noted: 2017-09-25

## 2024-01-15 PROBLEM — E78.00 HYPERCHOLESTEROLEMIA: Status: ACTIVE | Noted: 2021-04-07

## 2024-01-15 PROBLEM — I10 HTN (HYPERTENSION): Status: ACTIVE | Noted: 2021-04-07

## 2024-01-15 PROBLEM — R73.03 PREDIABETES: Status: ACTIVE | Noted: 2024-01-15

## 2024-01-15 PROBLEM — E03.9 HYPOTHYROIDISM: Status: ACTIVE | Noted: 2021-04-07

## 2024-01-15 PROCEDURE — 99204 OFFICE O/P NEW MOD 45 MIN: CPT | Performed by: PSYCHIATRY & NEUROLOGY

## 2024-01-15 NOTE — PROGRESS NOTES
Assessment/Plan:    1. HEIDI (obstructive sleep apnea)  -     CPAP Auto New DME  -     Pulse oximetry overnight    We discussed her sleep study in detail, certainly treatment is recommended with moderate obstructive sleep apnea.  She is a crowded airway and prominent overjet on exam.  As she describes that she has prominent nasal congestion she may need a full facemask with CPAP use.  She describes significant daytime sleepiness, we discussed the need to avoid drowsy driving, she understood my recommendations.    We discussed that based upon her home sleep test and symptoms, treatment for obstructive sleep apnea is needed.  I have ordered an auto-CPAP for her to expedite treatment at home.  That said, based on underlying hypoxemia, she may  need an in lab titration study which I have also ordered.  I will first followup with an overnight oximetry test     I will follow-up with her about -1 month after starting CPAP at home.    We also reviewed the need for weight loss, with weight loss sleep apnea can improve or resolve.  She has already been working on this.    Subjective:      Patient ID: Kika Mcmanus is a 68 y.o. female.    HPI    This is a 68-year-old female who presents as a new patient, she was referred by neurology after a recent home sleep test that showed moderate obstructive sleep apnea, respiratory event index 26.7.  On the report, as described that she had intermittent baseline hypoxemia, oxygen saturations were less than 89% for 29 minutes.  Past medical history is notable for asthma, depression, gastroesophageal reflux disease, hypertension, hyperlipidemia, hypothyroidism, arthritis, knee replacement    Cc- presents after abnormal home sleep test;  She snores a lot. She describes that she is always tired but that does not concern her a lot     She works from in the [employer] field, 8 am- 430 pm. Lives with her son.  He works from home, recently moved to [place], previously lived around  "[place]    She works daytime hours, on a typical night she goes to bed at 9 to 10 PM, usually falls asleep without difficulty.  She has 3-4 awakenings during the night and is up around 630-7 am AM.  When not working she goes to bed around 11 AM to 12 AM and is up around 10 to 11 AM.    She is sometimes refreshed upon awakening, usually feels sleepy during the day.  She takes naps rarely.  She describes excessive daytime sleepiness.  She describes she would go to the car and take a nap even 5 years ago. Has had sleepiness for years.  Can doze if inactive. Does not have drowsy driving except had one episode recently.      She has had loud snoring, choking, gagging, and snorting in sleep.    She has always rubbed her feet before sleep, no RLS.    Has nightmares often.      Caffeine- two 10 oz cups coffee  Alcohol -none    Merrill Sleepiness Scale  Sitting and reading: High chance of dozing  Watching TV: High chance of dozing  Sitting, inactive in a public place (e.g. a theatre or a meeting): High chance of dozing  As a passenger in a car for an hour without a break: High chance of dozing  Lying down to rest in the afternoon when circumstances permit: High chance of dozing  Sitting and talking to someone: Would never doze  Sitting quietly after a lunch without alcohol: Moderate chance of dozing  In a car, while stopped for a few minutes in traffic: Slight chance of dozing  Total score: 18      Review of Systems    Depression- controlled  Gerd- controlled  + shortness of breath up and down stairs  + palpitations  No ankle edema  + nasal congestion  Nose bleed sometimes when dry out       Objective:      Visit Vitals  /83 (BP Location: Left arm, Patient Position: Sitting, Cuff Size: Large)   Ht 5' 2\" (1.575 m)   Wt 96.6 kg (213 lb)   BMI 38.96 kg/m²   Smoking Status Never   BSA 1.96 m²             Physical Exam  Constitutional:       Appearance: Normal appearance.   HENT:      Head: Normocephalic and atraumatic. "      Mouth/Throat:      Mouth: Mucous membranes are moist.   Eyes:      Extraocular Movements: Extraocular movements intact.      Pupils: Pupils are equal, round, and reactive to light.   Cardiovascular:      Rate and Rhythm: Normal rate.      Pulses: Normal pulses.      Heart sounds: Normal heart sounds.   Pulmonary:      Effort: Pulmonary effort is normal.      Breath sounds: Normal breath sounds.   Musculoskeletal:      Right lower leg: No edema.      Left lower leg: No edema.   Neurological:      Mental Status: She is alert.   Psychiatric:         Mood and Affect: Mood normal.         Behavior: Behavior normal.         Thought Content: Thought content normal.         Judgment: Judgment normal.         14.75 in neck  Mallampati 4   Macroglossia , scalloping of tongue  Prominent overjet     Note reviewed-Notes from neurology to correlate with history, reviewed blood work showing a CO2 level of 30 mmol/L which could suggest obesity hypoventilation syndrome

## 2024-01-15 NOTE — PATIENT INSTRUCTIONS
I have ordered you an auto-CPAP machine which self adjusts to treat you for sleep apnea  I would then like you to have an oxygen test at home about 1 month after starting CPAP to make sure your oxygen levels are better      I reviewed your sleep study and it shows a moderate case of obstructive sleep apnea.  In brief, you have 27 times per hour where your airway closes/collapses in sleep.  When this occurs you stop breathing or have shallow breathing causing drops in your oxygen levels or a brief awakening.    CPAP is our best treatment for obstructive sleep apnea and I would recommend use of this at home.  I have ordered a machine for you today, this comes from a durable medical equipment company (medical supplier).  This company would meet with you, provide you the machine and also fit you for a mask to wear with the machine.      When you receive the machine at home, the goal is to use the machine all night, every night.  More usage equals more benefit and is better overall for health.  That said, there is often an adjustment, many times it takes practice to learn how to breathe with the mask on adjust the settings, etc.      If you run into difficulty  getting used to the machine, please do not hesitate to reach out to me via [employer] or a phone call (in general I prefer my chart but a phone call would also work) as when people have trouble getting used to CPAP, these problems are usually fixable.  I would then like to follow-up with you in the office approximately 1 month after you receive the machine at home and have been using it.  The machine typically has an  katheryn you can download on your phone to track your numbers and it also sends me information as well which I can refer to to track your progress.    Here is a recent article from the [employer] where the writer describes their adjustment to CPAP and tricks they learned to help them get successful.   https://www.washingtonpost.com/wellness/2023/04/29/cpap-machine-adjustment-sleep-apnea/    Please let me know if you have any questions, thanks!     It is very important to avoid driving while drowsy, this can be very dangerous or even cause serious injury or death.  If sleepy, it is not safe to get behind the wheel.  If you are driving and feels sleepy, it is very important to pull over right away.  Even losing control of the car for a split second can be deadly.  If you feel you cannot control when sleepiness occurs and cannot prevented, it is important to not drive at all until this improves.  Please let me know if you experience this as it is very important.     Nursing Support:  When: Monday through Friday 7A-5PM except holidays  Where: Our direct line is [phone].    If you are having a true emergency please call 911.  In the event that the line is busy or it is after hours please leave a voice message and we will return your call.  Please speak clearly, leaving your full name, birth date, best number to reach you and the reason for your call.   Medication refills: We will need the name of the medication, the dosage, the ordering provider, whether you get a 30 or 90 day refill, and the pharmacy name and address.  Medications will be ordered by the provider only.  Nurses cannot call in prescriptions.  Please allow 7 days for medication refills.  Physician requested updates: If your provider requested that you call with an update after starting medication, please be ready to provide us the medication and dosage, what time you take your medication, the time you attempt to fall asleep, time you fall asleep, when you wake up, and what time you get out of bed.  Sleep Study Results: We will contact you with sleep study results and/or next steps after the physician has reviewed your testing.

## 2024-01-16 ENCOUNTER — TELEPHONE (OUTPATIENT)
Dept: SLEEP CENTER | Facility: CLINIC | Age: 69
End: 2024-01-16

## 2024-01-17 NOTE — TELEPHONE ENCOUNTER
Patient DME set up being done at "[employer].     Compliance appointment scheduled for 3/12/24.     Rx and clinicals for CPAP DME and RUBI sent to [facility] via [place].

## 2024-01-21 LAB
DME PARACHUTE DELIVERY DATE EXPECTED: NORMAL
DME PARACHUTE DELIVERY DATE REQUESTED: NORMAL
DME PARACHUTE DELIVERY NOTE: NORMAL
DME PARACHUTE ITEM DESCRIPTION: NORMAL
DME PARACHUTE ORDER STATUS: NORMAL
DME PARACHUTE SUPPLIER NAME: NORMAL
DME PARACHUTE SUPPLIER PHONE: NORMAL

## 2024-01-25 LAB

## 2024-01-26 ENCOUNTER — TELEPHONE (OUTPATIENT)
Dept: NEUROLOGY | Facility: CLINIC | Age: 69
End: 2024-01-26

## 2024-01-26 NOTE — TELEPHONE ENCOUNTER
Received VM transcription from 1/25/24, 9:48 AM:    Hi, my name is Kika Mcmanus. Date of birth 1955. I'm a patient of Dr. Amezcua. I slipped and fell and hit my head on Saturday, this past Saturday. I don't remember the date, but I've having subsequent headaches every day. And I wondered if I should make an appointment to see the doctor. Please call me at [phone]. Thank you, aliyah.

## 2024-01-26 NOTE — TELEPHONE ENCOUNTER
Called pt re: below. States that she had a concussion in May, saw Dr Amezcua in July. Her HA stopped since she saw Dr Amezcua but restarted since her last fall this past Sat. She slipped and hit the back of her head. There is lump, some pain. She did not loss consciousness, did not see stars like the previous fall. She did not have difficulty thinking.   States that she got her CPAP machine. Her HA is better today.   Last seen 7/28/23. Preferred virtual appt.  Virtual video appt scheduled 1/29/24 at 3 pm

## 2024-01-29 ENCOUNTER — TELEPHONE (OUTPATIENT)
Dept: NEUROLOGY | Facility: CLINIC | Age: 69
End: 2024-01-29

## 2024-01-29 ENCOUNTER — TELEMEDICINE (OUTPATIENT)
Dept: NEUROLOGY | Facility: CLINIC | Age: 69
End: 2024-01-29
Payer: COMMERCIAL

## 2024-01-29 DIAGNOSIS — G43.009 MIGRAINE WITHOUT AURA AND WITHOUT STATUS MIGRAINOSUS, NOT INTRACTABLE: ICD-10-CM

## 2024-01-29 DIAGNOSIS — E66.9 OBESITY (BMI 30-39.9): ICD-10-CM

## 2024-01-29 DIAGNOSIS — F32.A ANXIETY AND DEPRESSION: ICD-10-CM

## 2024-01-29 DIAGNOSIS — G44.309 POST-TRAUMATIC HEADACHE: Primary | ICD-10-CM

## 2024-01-29 DIAGNOSIS — F41.9 ANXIETY AND DEPRESSION: ICD-10-CM

## 2024-01-29 DIAGNOSIS — G47.33 OSA (OBSTRUCTIVE SLEEP APNEA): ICD-10-CM

## 2024-01-29 DIAGNOSIS — Z87.820 HISTORY OF CONCUSSION: ICD-10-CM

## 2024-01-29 PROCEDURE — 99214 OFFICE O/P EST MOD 30 MIN: CPT | Performed by: STUDENT IN AN ORGANIZED HEALTH CARE EDUCATION/TRAINING PROGRAM

## 2024-01-29 RX ORDER — PREDNISONE 20 MG/1
TABLET ORAL DAILY
Qty: 12 TABLET | Refills: 0 | Status: SHIPPED | OUTPATIENT
Start: 2024-01-29 | End: 2024-02-04

## 2024-01-29 NOTE — PROGRESS NOTES
[facility] Consult - Follow up   PATIENT:  Kika Mcmanus  MRN:  [id number]  :  1955  DATE OF SERVICE:  2024  REFERRED BY: No ref. provider found  PMD: Ligia Arevalo    Telemedicine consent    Patient: Kika Mcmanus  Provider: Valentin Amezcua DO  Provider located at [address] [place] [address] [facility] [address] [address]    The patient was identified by name and date of birth. Kika Mcmanus was informed that this is a telemedicine visit and that the visit is being conducted through the Epic Embedded platform. She agrees to proceed..  My office door was closed. No one else was in the room.  She acknowledged consent and understanding of privacy and security of the video platform. The patient has agreed to participate and understands they can discontinue the visit at any time.    Patient is aware this is a billable service.  Assessment/Plan:   Kika Mcmanus is a very pleasant 67 y.o. female with a past medical history that includes anxiety, depression, HLD, prediabetes here for f/u evaluation of mild TBI/concussion.     At today's visit, she reports that she was doing exceptionally well for the last few months.  She was essentially headache free up until a fall a little over 1 week ago.  She reports that she slipped and hit the back of her head on the wall.  She denied any symptoms at that time, but reported the development of a headache approximately 2 days later.  This headache has not been intractable and she has found some relief if she takes Advil, which she has done sparingly.  She started taking her allergy medication again in addition to the magnesium and B2 supplements that she had stopped because she was doing well.  I have recommended that we bridge her with a short course of prednisone to see if we can get her any more relief.  Outside of headaches, she has been using her CPAP since Thursday and finds that the quality  of her sleep has significantly improved.  I emphasized the importance of continuing with this going forward.  We also discussed the importance of going to the hospital should she develop any new or odd neurological symptoms including numbness, tingling, weakness, but it is reassuring that aside from headaches, she has been doing well and it has been over 1 week since the fall.    Workup:  - Neurocognitive assessment reveals normal neurological exam  - MRI scheduled for 8/17 at [facility] - normal as per patient  - Home sleep study 1/2/2024: Moderate obstructive sleep apnea     Preventative:  - we discussed headache hygiene and lifestyle factors that may improve headaches  - Mg and B2  - Currently on through other providers: Wellbutrin, Zoloft, Amlodipine  - Past/ failed/contraindicated: None  - future options: Topamax, Memantine, CGRP med, botox     Acute:  - discussed not taking over-the-counter or prescription pain medications more than 3 days per week to prevent medication overuse/rebound headache  - Prednisone bridge  - Currently on through other providers: None  - Past/ failed/contraindicated: None  - future options:  Triptan, prochlorperazine, Toradol IM or p.o., could consider trial of 5 days of Depakote 500 mg nightly or dexamethasone 2 mg daily for prolonged migraine, ubrelvy, reyvow, nurtec  Patient instructions   Headache/migraine treatment:   Abortive medications (for immediate treatment of a headache): Ok to take ibuprofen or acetaminophen for headaches, but try to limit the amount and frequency that you are taking to avoid medication overuse/rebound headache. Ideally no more than 2-3 days per week.     Bridge  Prednisone Bridge  Day 1 and 2: 60mg (3 tablets)  Day 3 and 4: 40mg (2 tablets)  Day 5 and 6: 20mg (1 tablet)    Over the counter preventive supplements for headaches/migraines - try for 2-3 months at least   (to take every day to help prevent headaches - not to take at the time of headache):  -  Magnesium 400mg daily  - Can occasionally cause stomach upset - if so try at night, with food or stop, rarely can cause diarrhea if so stop - oxide or glycinate  - Riboflavin (Vitamin B2) 400mg daily - FYI B2 may make your urine bright/neon yellow - find online      Lifestyle Recommendations:  - Maintain good sleep hygiene.  Going to bed and waking up at consistent times, avoiding excessive daytime naps, avoiding caffeinated beverages in the evening, avoid excessive stimulation in the evening and generally using bed primarily for sleeping.  One hour before bedtime would recommend turning lights down lower, decreasing your activity (may read quietly, listen to music at a low volume). When you get into bed, should eliminate all technology (no texting, emailing, playing with your phone, iPad or tablet in bed).  - Maintain good hydration. Drink  2L of fluid a day (4 typical small water bottles)  - Maintain good nutrition. In particular don't skip meals and eat balanced meals regularly.     Education and Follow-up  - Please contact us if any questions or concerns arise. Of course, try to protect yourself from head injuries, and if any new concerning symptoms or significant blow to the head or body go to the emergency department.  - Follow up in 4 months  Subjective:   1/29/24: At today's visit, she reports that she was doing well for a couple months until a fall last week.  Since then she has had daily headaches. She reports that she slipped and hit the back of her head against the corner of the drywall (1/20/24). Denied any other symptoms aside from pain. Her headache developed about 2 days post-fall. Aside from her allergy medication, she has taken Advil sparingly. In terms of sleep, she has been using a CPAP since Thursday. She feels that the quality of her sleep has improved.     Previous History:  7/28/23: Ms. Mcmanus hit her head on the ceiling on 5/10/2023.  Since that time she has had persistent, nearly daily  headaches.  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  We discussed a variety of potential treatment options, thought that we would bridge her with a course of prednisone and have her try magnesium and B2 supplements for prevention.  She is pending a scheduled MRI that was ordered by her PCP 2 weeks I have asked her to send us the results of that test.  I have ordered a sleep study for her as well given the fact that she gets about 10 hours of sleep per night, but reports feeling unrefreshed in the morning.  He is currently out of work until the end of August, so we will continue to work on her headache during this time, but from my standpoint I have no specific restrictions with her returning to work and asked that she do things as tolerated.  I have also emphasized the importance of trying to incorporate physical activity into her recovery.    Past Medical History:     Past Medical History:   Diagnosis Date    GERD (gastroesophageal reflux disease)     Hypertension     Prediabetes        Patient Active Problem List   Diagnosis    HEIDI (obstructive sleep apnea)    Asthma    Depression    GERD (gastroesophageal reflux disease)    HTN (hypertension)    Hypercholesterolemia    Hypothyroidism    Prediabetes    Seasonal allergies    S/P knee replacement       Medications:      Current Outpatient Medications   Medication Sig Dispense Refill    amLODIPine (NORVASC) 10 mg tablet   See Instructions, TAKE ONE TABLET BY MOUTH AT BEDTIME, # 90 tab(s), 1 Refill(s), Pharmacy: [id number], 158.75, 06/02/22 19:01:00 EDT, Height/Length, cm, 95.708, 06/02/22 19:01:00 EDT, Weight Measured, kg      aspirin (ECOTRIN LOW STRENGTH) 81 mg EC tablet every 24 hours      buPROPion (WELLBUTRIN XL) 300 mg 24 hr tablet Take 300 mg by mouth daily      levothyroxine 25 mcg tablet   = 1 tab(s), Oral, daily, # 90 tab(s), 0 Refill(s), Pharmacy: [id number], 158.75, 04/27/23 18:39:00 EDT, Height/Length, cm,  98.43, 04/27/23 18:39:00 EDT, Weight Measured, kg      loratadine (CLARITIN) 10 mg tablet Take 10 mg by mouth daily (Patient not taking: Reported on 1/15/2024)      omeprazole (PriLOSEC) 20 mg delayed release capsule TAKE ONE CAPSULE BY MOUTH EVERY DAY  MAY OPEN CAPSULE AND PUT CONTENTS IN APPLE SAUCE IF NEEDED TO SWALLOW)      rosuvastatin (CRESTOR) 20 MG tablet 10 mg      sertraline (ZOLOFT) 100 mg tablet Take 200 mg by mouth daily      vitamin B-12 (VITAMIN B-12) 1,000 mcg tablet 1,000 mcg       No current facility-administered medications for this visit.        Allergies:      Allergies   Allergen Reactions    Other GI Intolerance     Eggplant    Oxycodone-Acetaminophen Itching    Penicillins Dizziness     unknown  unknown      Shellfish Allergy - Food Allergy Other (See Comments)       Family History:     Family History   Problem Relation Age of Onset    Breast cancer Mother     Hypertension Mother     Hyperlipidemia Mother     Hyperlipidemia Father     Hypertension Father     Kidney disease Father     Heart failure Father        Social History:     Social History     Socioeconomic History    Marital status: Single     Spouse name: Not on file    Number of children: Not on file    Years of education: Not on file    Highest education level: Not on file   Occupational History    Not on file   Tobacco Use    Smoking status: Never    Smokeless tobacco: Never   Vaping Use    Vaping status: Never Used   Substance and Sexual Activity    Alcohol use: Not Currently    Drug use: Never    Sexual activity: Not on file   Other Topics Concern    Not on file   Social History Narrative    Not on file     Social Determinants of Health     Financial Resource Strain: Not on file   Food Insecurity: Not on file   Transportation Needs: Not on file   Physical Activity: Not on file   Stress: Not on file   Social Connections: Not on file   Intimate Partner Violence: Not on file   Housing Stability: Not on file         Objective:    Physical Exam:                                                               Vitals:            Constitutional:  There were no vitals taken for this visit.  BP Readings from Last 3 Encounters:   01/15/24 128/83   07/28/23 144/74     Pulse Readings from Last 3 Encounters:   07/28/23 76         Well developed, well nourished, No dysmorphic features.       HEENT:  Normocephalic atraumatic. See neuro exam   Psychiatric:  Normal behavior and appropriate affect       Neurological Examination:     Mental status/cognitive function:   Recent and remote memory appear intact. Attention span and concentration as well as fund of knowledge appear appropriate for age. Normal language and spontaneous speech.  Cranial Nerves:  III, IV, VI-Pupils were equal, round. Extraocular movements appear full and conjugate   VII-facial expression symmetric  Motor Exam: symmetric bulk throughout. no atrophy, fasciculations or abnormal movements noted.   Coordination:  no apparent dysmetria, ataxia or tremor noted  Review of Systems:   Constitutional:  Negative for appetite change, fatigue and fever.   HENT: Negative.  Negative for hearing loss, tinnitus, trouble swallowing and voice change.    Eyes: Negative.  Negative for photophobia, pain and visual disturbance.   Respiratory: Negative.  Negative for shortness of breath.    Cardiovascular: Negative.  Negative for palpitations.   Gastrointestinal: Negative.  Negative for nausea and vomiting.   Endocrine: Negative.  Negative for cold intolerance.   Genitourinary: Negative.  Negative for dysuria, frequency and urgency.   Musculoskeletal:  Negative for back pain, gait problem, myalgias, neck pain and neck stiffness.   Skin: Negative.  Negative for rash.   Allergic/Immunologic: Negative.    Neurological:  Positive for headaches. Negative for dizziness, tremors, seizures, syncope, facial asymmetry, speech difficulty, weakness, light-headedness and numbness.   Hematological: Negative.  Does not  bruise/bleed easily.   Psychiatric/Behavioral: Negative.  Negative for confusion, hallucinations and sleep disturbance.    All other systems reviewed and are negative.    I have spent 15 minutes with Patient  today in which greater than 50% of this time was spent in counseling/coordination of care regarding Diagnostic results, Prognosis, Risks and benefits of tx options, Patient and family education, Importance of tx compliance, Impressions, Documenting in the medical record, Reviewing / ordering tests, medicine, procedures  , and Obtaining or reviewing history  . I also spent 15 minutes non face to face for this patient the same day.     Activity Minutes   Precharting/reviewing 10   Patient care/counseling 15   Postcharting/care coordination 5       Author:  Valentin Amezcua DO 1/29/2024 6:44 AM

## 2024-01-29 NOTE — PATIENT INSTRUCTIONS
Headache/migraine treatment:   Abortive medications (for immediate treatment of a headache): Ok to take ibuprofen or acetaminophen for headaches, but try to limit the amount and frequency that you are taking to avoid medication overuse/rebound headache. Ideally no more than 2-3 days per week.     Bridge  Prednisone Bridge  Day 1 and 2: 60mg (3 tablets)  Day 3 and 4: 40mg (2 tablets)  Day 5 and 6: 20mg (1 tablet)    Over the counter preventive supplements for headaches/migraines - try for 2-3 months at least   (to take every day to help prevent headaches - not to take at the time of headache):  - Magnesium 400mg daily  - Can occasionally cause stomach upset - if so try at night, with food or stop, rarely can cause diarrhea if so stop - oxide or glycinate  - Riboflavin (Vitamin B2) 400mg daily - FYI B2 may make your urine bright/neon yellow - find online      Lifestyle Recommendations:  - Maintain good sleep hygiene.  Going to bed and waking up at consistent times, avoiding excessive daytime naps, avoiding caffeinated beverages in the evening, avoid excessive stimulation in the evening and generally using bed primarily for sleeping.  One hour before bedtime would recommend turning lights down lower, decreasing your activity (may read quietly, listen to music at a low volume). When you get into bed, should eliminate all technology (no texting, emailing, playing with your phone, iPad or tablet in bed).  - Maintain good hydration. Drink  2L of fluid a day (4 typical small water bottles)  - Maintain good nutrition. In particular don't skip meals and eat balanced meals regularly.     Education and Follow-up  - Please contact us if any questions or concerns arise. Of course, try to protect yourself from head injuries, and if any new concerning symptoms or significant blow to the head or body go to the emergency department.  - Follow up in 4 months

## 2024-01-29 NOTE — PROGRESS NOTES
Review of Systems   Constitutional:  Negative for appetite change, fatigue and fever.   HENT: Negative.  Negative for hearing loss, tinnitus, trouble swallowing and voice change.    Eyes: Negative.  Negative for photophobia, pain and visual disturbance.   Respiratory: Negative.  Negative for shortness of breath.    Cardiovascular: Negative.  Negative for palpitations.   Gastrointestinal: Negative.  Negative for nausea and vomiting.   Endocrine: Negative.  Negative for cold intolerance.   Genitourinary: Negative.  Negative for dysuria, frequency and urgency.   Musculoskeletal:  Negative for back pain, gait problem, myalgias, neck pain and neck stiffness.   Skin: Negative.  Negative for rash.   Allergic/Immunologic: Negative.    Neurological:  Positive for headaches. Negative for dizziness, tremors, seizures, syncope, facial asymmetry, speech difficulty, weakness, light-headedness and numbness.   Hematological: Negative.  Does not bruise/bleed easily.   Psychiatric/Behavioral: Negative.  Negative for confusion, hallucinations and sleep disturbance.    All other systems reviewed and are negative.    Since your last visit are your headaches Worsened    Any change to the headache type? no    What is your current headache frequency: daily since fall    Are you taking your current medications as prescribed? yes    Do you have any side effects? no    How may days per week do you take an abortive medicine? 1/7

## 2024-03-28 ENCOUNTER — OFFICE VISIT (OUTPATIENT)
Dept: SLEEP CENTER | Facility: CLINIC | Age: 69
End: 2024-03-28
Payer: COMMERCIAL

## 2024-03-28 VITALS
HEART RATE: 72 BPM | RESPIRATION RATE: 18 BRPM | BODY MASS INDEX: 39.38 KG/M2 | HEIGHT: 62 IN | SYSTOLIC BLOOD PRESSURE: 120 MMHG | DIASTOLIC BLOOD PRESSURE: 74 MMHG | WEIGHT: 214 LBS | OXYGEN SATURATION: 98 %

## 2024-03-28 DIAGNOSIS — R09.02 HYPOXEMIA: ICD-10-CM

## 2024-03-28 DIAGNOSIS — G47.33 OSA (OBSTRUCTIVE SLEEP APNEA): Primary | ICD-10-CM

## 2024-03-28 PROCEDURE — 99213 OFFICE O/P EST LOW 20 MIN: CPT | Performed by: PHYSICIAN ASSISTANT

## 2024-03-28 NOTE — PROGRESS NOTES
Progress Note - [facility]   Kika Mcmanus 68 y.o. female   :1955, MRN: [id number]  3/28/2024          Follow Up Evaluation / Problem:     Moderate Obstructive Sleep Apnea      Thank you for the opportunity of participating in the evaluation and care of this patient in the Sleep Clinic at [facility].      HPI: Kika Mcmanus is a 68 y.o. year old female.  The patient presents for follow up of moderate obstructive sleep apnea.  She completed a home sleep study ordered by her neurologist on 2023 for symptoms of loud snoring, frequent headaches, and daytime sleepiness.  Her home study demonstrated  moderate obstructive sleep apnea with a respiratory event index of 26.7.  Auto CPAP was then ordered by Dr. Tijerina.  Overnight pulse oximetry test was also ordered for hypoxemia.  Patient states she has not completed this test yet.  She presents today to review compliance and effectiveness of treatment.        Current Outpatient Medications:     amLODIPine (NORVASC) 10 mg tablet,  See Instructions, TAKE ONE TABLET BY MOUTH AT BEDTIME, # 90 tab(s), 1 Refill(s), Pharmacy: [id number], 158.75, 22 19:01:00 EDT, Height/Length, cm, 95.708, 22 19:01:00 EDT, Weight Measured, kg, Disp: , Rfl:     aspirin (ECOTRIN LOW STRENGTH) 81 mg EC tablet, every 24 hours, Disp: , Rfl:     buPROPion (WELLBUTRIN XL) 300 mg 24 hr tablet, Take 300 mg by mouth daily, Disp: , Rfl:     Cholecalciferol (D3 PO), Take by mouth, Disp: , Rfl:     levothyroxine 25 mcg tablet, 50 mcg, Disp: , Rfl:     MAGNESIUM PO, Take by mouth, Disp: , Rfl:     omeprazole (PriLOSEC) 20 mg delayed release capsule, TAKE ONE CAPSULE BY MOUTH EVERY DAY  MAY OPEN CAPSULE AND PUT CONTENTS IN APPLE SAUCE IF NEEDED TO SWALLOW), Disp: , Rfl:     Riboflavin (VITAMIN B2 PO), Take by mouth, Disp: , Rfl:     rosuvastatin (CRESTOR) 20 MG tablet, 10 mg, Disp: , Rfl:     sertraline (ZOLOFT) 100  "mg tablet, Take 200 mg by mouth daily, Disp: , Rfl:     loratadine (CLARITIN) 10 mg tablet, Take 10 mg by mouth daily (Patient not taking: Reported on 1/15/2024), Disp: , Rfl:     vitamin B-12 (VITAMIN B-12) 1,000 mcg tablet, 1,000 mcg (Patient not taking: Reported on 1/29/2024), Disp: , Rfl:     How likely are you to doze off or fall asleep in the following situations, in contrast to feeling just tired?  Sitting and reading: Would never doze  Watching TV: Would never doze  Sitting, inactive in a public place (e.g. a theatre or a meeting): Would never doze  As a passenger in a car for an hour without a break: Would never doze  Lying down to rest in the afternoon when circumstances permit: Moderate chance of dozing  Sitting and talking to someone: Would never doze  Sitting quietly after a lunch without alcohol: Would never doze  In a car, while stopped for a few minutes in traffic: Would never doze  Total score: 2              Vitals:    03/28/24 1119   BP: 120/74   Pulse: 72   Resp: 18   SpO2: 98%   Weight: 97.1 kg (214 lb)   Height: 5' 2\" (1.575 m)       Body mass index is 39.14 kg/m².            EPWORTH SLEEPINESS SCORE  Total score: 2      Past History Since Last Sleep Center Visit:     Patient states she is doing very well with her CPAP.  She looks forward to wearing it at nighttime.  She states she is sleeping deeper and more peacefully.  She states she feels more rested during the day.  She is not snoring.  She has not been experiencing headaches.  She did not complete her overnight pulse oximetry test yet.  She states she would prefer to do this versus an in lab sleep study.    The patient reports that she cleans the equipment appropriately and changes supplies on a regular basis.    The review of systems and following portions of the patient's history were reviewed and updated as appropriate: allergies, current medications, past family history, past medical history, past social history, past surgical " history, and problem list.        OBJECTIVE  Equipment set up date: January 2024, ResMed S11  PAP Pressure: Nasal AutoPAP using a lower limit of 5 cm and an upper limit of 15 cm of water pressure  Type of mask used: full face hybrid, medium   DME Provider: [facility]  Denies aerophagia or AM headaches    Physical Exam:     General Appearance:   Alert, cooperative, no distress, appears stated age         ASSESSMENT / PLAN    1. HEIDI (obstructive sleep apnea)  Assessment & Plan:  Patient was diagnosed with moderate obstructive sleep apnea in December 2023.  She began use of CPAP in January 2024.  She is using her CPAP 100% of the time greater than 4 hours 100% of the time with a residual AHI of 4.1.  Her CPAP usage has eliminated snoring, daytime sleepiness, and headaches.  She is very comfortable with using her CPAP except for some leakage issues.  She states she was originally given a small mask and is now switched to a medium mask and feels this is helping with the leakage.  I advised her to continue to use her CPAP nightly.  I also advised her to complete the overnight pulse oximetry test to assess for any hypoxemia with use of her CPAP.  If her oxygen levels are maintained with use of CPAP, she does not need to complete a titration study.  However, if her overnight pulse oximetry test shows low oxygenation with use of CPAP, we discussed scheduling an in lab CPAP titration study.  Patient was in agreement and will complete the overnight pulse oximetry test to soon as possible.  She will then follow-up in 6 months.    Orders:  -     Pulse oximetry overnight    2. Hypoxemia  -     Pulse oximetry overnight             Counseling / Coordination of Care  I have spent a total time of 25 minutes on 03/28/24 in caring for this patient including Risks and benefits of tx options, Instructions for management, Patient and family education, Importance of tx compliance, Risk factor reductions, Impressions, Counseling /  Coordination of care, Documenting in the medical record, Reviewing / ordering tests, medicine, procedures  , and Obtaining or reviewing history  .      Today I reviewed the patient's compliance data.  she has been able to use the equipment 100% of all days recorded.  Average usage was 4 or more hours 100% of all days recorded.  The patient uses the equipment for an average of 9 hours and 23 minutes per night.  The estimated AHI is 4.1 abnormal breathing events per hour.  The patient feels they benefit from the use of PAP equipment and would like to continue PAP therapy.  Response to treatment has been good.  A prescription for supplies has been provided to last for the next year.    She will continue using this equipment at the settings noted above for the next 6 months.  At that timeshe will then return for a routine follow-up evaluation. I have asked the patient to contact the Sleep Disorders Center if she encounters any difficulties prior to that time.    The following instructions have been given to the patient today:    Patient Instructions   I placed an order today for an overnight pulse oximetry test.  [employer] can mail this to you.  We would like you to wear this test for 1 night while you are using your CPAP machine.  This will ensure that your oxygen levels are maintained above 90 with use of your CPAP.  If this test does not show that your oxygenation levels are well-controlled, we would then recommend doing an in lab titration study.  Continue to use your CPAP nightly.  Your compliance report looks great except for some leakage.  I would suggest using the medium size mask as you felt your leakage was less with the size.  We can then follow-up with you in 6 months.  Sooner if you have any questions or concerns.    Nursing Support:  When: Monday through Friday 7A-5PM except holidays  Where: Our direct line is [phone].    If you are having a true emergency please call 911.  In the event that the  line is busy or it is after hours please leave a voice message and we will return your call.  Please speak clearly, leaving your full name, birth date, best number to reach you and the reason for your call.   Medication refills: We will need the name of the medication, the dosage, the ordering provider, whether you get a 30 or 90 day refill, and the pharmacy name and address.  Medications will be ordered by the provider only.  Nurses cannot call in prescriptions.  Please allow 7 days for medication refills.  Physician requested updates: If your provider requested that you call with an update after starting medication, please be ready to provide us the medication and dosage, what time you take your medication, the time you attempt to fall asleep, time you fall asleep, when you wake up, and what time you get out of bed.  Sleep Study Results: We will contact you with sleep study results and/or next steps after the physician has reviewed your testing.         Emilie Serrato PA-C  [facility]

## 2024-03-28 NOTE — ASSESSMENT & PLAN NOTE
Patient was diagnosed with moderate obstructive sleep apnea in December 2023.  She began use of CPAP in January 2024.  She is using her CPAP 100% of the time greater than 4 hours 100% of the time with a residual AHI of 4.1.  Her CPAP usage has eliminated snoring, daytime sleepiness, and headaches.  She is very comfortable with using her CPAP except for some leakage issues.  She states she was originally given a small mask and is now switched to a medium mask and feels this is helping with the leakage.  I advised her to continue to use her CPAP nightly.  I also advised her to complete the overnight pulse oximetry test to assess for any hypoxemia with use of her CPAP.  If her oxygen levels are maintained with use of CPAP, she does not need to complete a titration study.  However, if her overnight pulse oximetry test shows low oxygenation with use of CPAP, we discussed scheduling an in lab CPAP titration study.  Patient was in agreement and will complete the overnight pulse oximetry test to soon as possible.  She will then follow-up in 6 months.

## 2024-03-28 NOTE — PATIENT INSTRUCTIONS
I placed an order today for an overnight pulse oximetry test.  [employer] can mail this to you.  We would like you to wear this test for 1 night while you are using your CPAP machine.  This will ensure that your oxygen levels are maintained above 90 with use of your CPAP.  If this test does not show that your oxygenation levels are well-controlled, we would then recommend doing an in lab titration study.  Continue to use your CPAP nightly.  Your compliance report looks great except for some leakage.  I would suggest using the medium size mask as you felt your leakage was less with the size.  We can then follow-up with you in 6 months.  Sooner if you have any questions or concerns.    Nursing Support:  When: Monday through Friday 7A-5PM except holidays  Where: Our direct line is [phone].    If you are having a true emergency please call 911.  In the event that the line is busy or it is after hours please leave a voice message and we will return your call.  Please speak clearly, leaving your full name, birth date, best number to reach you and the reason for your call.   Medication refills: We will need the name of the medication, the dosage, the ordering provider, whether you get a 30 or 90 day refill, and the pharmacy name and address.  Medications will be ordered by the provider only.  Nurses cannot call in prescriptions.  Please allow 7 days for medication refills.  Physician requested updates: If your provider requested that you call with an update after starting medication, please be ready to provide us the medication and dosage, what time you take your medication, the time you attempt to fall asleep, time you fall asleep, when you wake up, and what time you get out of bed.  Sleep Study Results: We will contact you with sleep study results and/or next steps after the physician has reviewed your testing.

## 2024-03-29 ENCOUNTER — TELEPHONE (OUTPATIENT)
Dept: SLEEP CENTER | Facility: CLINIC | Age: 69
End: 2024-03-29

## 2024-03-29 LAB
DME PARACHUTE DELIVERY DATE REQUESTED: NORMAL
DME PARACHUTE ITEM DESCRIPTION: NORMAL
DME PARACHUTE ORDER STATUS: NORMAL
DME PARACHUTE SUPPLIER NAME: NORMAL
DME PARACHUTE SUPPLIER PHONE: NORMAL

## 2024-04-16 ENCOUNTER — TELEPHONE (OUTPATIENT)
Dept: SLEEP CENTER | Facility: CLINIC | Age: 69
End: 2024-04-16

## 2024-04-16 NOTE — TELEPHONE ENCOUNTER
Patient left message requesting results of overnight pulse oximetry.    RUBI Phan results are in media.  Please advise.

## 2024-04-18 NOTE — TELEPHONE ENCOUNTER
Per message from HAILEY Serrato PA-C:  Patients oxygen was at 90% or greater except for 32 seconds of the study. No need for a CPAP study or oxygen. Please continue to use CPAP nightly.  ----------------------------------------------    Call placed to patient, advised of above.  Patient verbalized understanding.

## 2024-10-01 ENCOUNTER — TELEPHONE (OUTPATIENT)
Dept: SLEEP CENTER | Facility: CLINIC | Age: 69
End: 2024-10-01

## 2024-10-01 ENCOUNTER — TELEMEDICINE (OUTPATIENT)
Dept: SLEEP CENTER | Facility: CLINIC | Age: 69
End: 2024-10-01
Payer: COMMERCIAL

## 2024-10-01 DIAGNOSIS — G47.33 OSA (OBSTRUCTIVE SLEEP APNEA): Primary | ICD-10-CM

## 2024-10-01 PROCEDURE — 99213 OFFICE O/P EST LOW 20 MIN: CPT | Performed by: PHYSICIAN ASSISTANT

## 2024-10-01 NOTE — PROGRESS NOTES
Virtual Regular Visit  Name: Kika Mcmanus      : 1955      MRN: [id number]  Encounter Provider: Emilie Serrato PA-C  Encounter Date: 10/1/2024   Encounter department: [facility] [place]    Verification of patient location:    Patient is located at Home in the following state in which I hold an active license [place]    Assessment & Plan  HEIDI (obstructive sleep apnea)  Patient has a history of moderate obstructive sleep apnea.  She is currently using her CPAP 100% of the time, greater than 4 hours 97% of the time with a residual AHI of 3.2.  Average session of 9 hours and 19 minutes per night.  She completed an overnight pulse oximetry test which demonstrated her oxygen to be maintained at 90% or greater for all except 32 seconds of the night.  Patient's treatment is going very well.  She finds her treatment to be effective.  She will continue to use her CPAP nightly and follow-up in 1 year or sooner if needed.    Orders:    PAP DME Resupply/Reorder        Encounter provider Emilie Serrato PA-C    The patient was identified by name and date of birth. Kika Mcmanus was informed that this is a telemedicine visit and that the visit is being conducted through the Epic Embedded platform. She agrees to proceed..  My office door was closed. No one else was in the room.  She acknowledged consent and understanding of privacy and security of the video platform. The patient has agreed to participate and understands they can discontinue the visit at any time.    Patient is aware this is a billable service.     History of Present Illness     Kika Mcmanus is a 68 y.o. female who presents for follow up of moderate obstructive sleep apnea.  She completed a home sleep study ordered by her neurologist on 2023 for symptoms of loud snoring, frequent headaches, and daytime sleepiness.  Her home study demonstrated moderate obstructive sleep apnea with a respiratory event index of 26.7.  Auto CPAP was  then ordered, and she began treatment in January 2024.  Overnight pulse oximetry test was also ordered to ensure her oxygen levels were maintained above 90% as the patient preferred not to complete the CPAP study.  She has noted her excessive daytime sleepiness, snoring and frequent headaches have resolved.  She has no complaints or concerns today.    ResMed AirSense 11 set up in January 2024  Pressure of 5 to 15 cm of H2O  Fullface hybrid style mask [facility]      Review of Systems   HENT: Negative.     Respiratory: Negative.     Cardiovascular: Negative.    Gastrointestinal: Negative.            Objective     There were no vitals taken for this visit.  Physical Exam  Constitutional:       General: She is not in acute distress.  HENT:      Head: Normocephalic and atraumatic.      Nose: Nose normal.   Eyes:      Conjunctiva/sclera: Conjunctivae normal.   Neurological:      Mental Status: She is alert.   Psychiatric:         Mood and Affect: Mood normal.         Behavior: Behavior normal.         Visit Time  Total Visit Duration: 20 minutes

## 2024-10-01 NOTE — ASSESSMENT & PLAN NOTE
Patient has a history of moderate obstructive sleep apnea.  She is currently using her CPAP 100% of the time, greater than 4 hours 97% of the time with a residual AHI of 3.2.  Average session of 9 hours and 19 minutes per night.  She completed an overnight pulse oximetry test which demonstrated her oxygen to be maintained at 90% or greater for all except 32 seconds of the night.  Patient's treatment is going very well.  She finds her treatment to be effective.  She will continue to use her CPAP nightly and follow-up in 1 year or sooner if needed.    Orders:    PAP DME Resupply/Reorder

## 2024-10-02 LAB

## 2024-12-04 ENCOUNTER — NURSE TRIAGE (OUTPATIENT)
Age: 69
End: 2024-12-04

## 2024-12-04 DIAGNOSIS — R41.3 MEMORY PROBLEM: Primary | ICD-10-CM

## 2024-12-04 NOTE — TELEPHONE ENCOUNTER
Called and advised pt of the below. She verbalized understanding. Pt states that she has OT appt already scheduled on 12/27/24.  Pt is agreeable to see a general/comprehensive neurology to evaluate and get a baseline of her memory issues.  Pt preferred [place] location    Ok to leave detailed message     Dr Andino, are you agreeable? Ok to schedule this pt w/ you?

## 2024-12-04 NOTE — TELEPHONE ENCOUNTER
I agree with ENT evaluation for hearing issues as that can often contribute to cognitive issues.  It is unlikely that her symptoms are in the setting of her injury earlier this year.  I can put in a referral to occupational therapy, but would recommend an evaluation in the general neurology clinic for cognitive issues.

## 2024-12-04 NOTE — TELEPHONE ENCOUNTER
"Pt reports increasing difficulty w/memory. Pt was initially seen in 7/2023 s/p concussion in May 2023.   Per 1/29/24 LOV note:  Workup:  - Neurocognitive assessment reveals normal neurological exam    Pt is having difficulty at work. She has to learn new process and has failed x 4. She states this is not her. She just \"cannot get it.\"     Pt went to  her daughter the other day. She frequently goes to daughter's house. Pt \"freaked out\" because she could not recognize her daughter's house.     Pt went to doctor's appt w/daughter. Provider questioned pt on daughter's medical history. Pt states she just really couldn't remember things.     Pt's son lives w/her. He told her to get her hearing checked because she often doesn't hear him. Pt has been trying to establish care w/ENT closer to her home (near [facility]).     Pt states she is 100% compliant w/her CPAP and notes a huge improvement since starting therapy.     Pt is requesting referral/advice on who she should see to address memory issues.     [phone] - okay to leave detailed msg.     Dr. Amezcua - please advise. Thank you.       Answer Assessment - Initial Assessment Questions  1. REASON FOR CALL: \"What is your main concern right now?\"      Memory issues.    2. ONSET: \"When did the memory issues start?\"      Gradually worsening. Unsure of onset.    3. SEVERITY: \"How bad is the memory problem?\"      It is affecting pt's work. She has been unable to perform certain tasks at her job.     4. FEVER: \"Do you have a fever?\"      No.     5. OTHER SYMPTOMS: \"Do you have any other new symptoms?\"      No.     6. TREATMENTS AND RESPONSE: \"What have you done so far to try to make this better? What medicines have you used?\"      Nothing done to date. Patient is requesting advice and referral if possible.    Protocols used: No Protocol Available-Adult-[place]    "